# Patient Record
Sex: FEMALE | Race: WHITE | NOT HISPANIC OR LATINO | Employment: FULL TIME | URBAN - METROPOLITAN AREA
[De-identification: names, ages, dates, MRNs, and addresses within clinical notes are randomized per-mention and may not be internally consistent; named-entity substitution may affect disease eponyms.]

---

## 2017-01-06 ENCOUNTER — OFFICE VISIT (OUTPATIENT)
Dept: BARIATRICS/WEIGHT MGMT | Facility: CLINIC | Age: 52
End: 2017-01-06

## 2017-01-06 VITALS
BODY MASS INDEX: 39.69 KG/M2 | WEIGHT: 224 LBS | RESPIRATION RATE: 18 BRPM | TEMPERATURE: 97.9 F | HEIGHT: 63 IN | DIASTOLIC BLOOD PRESSURE: 76 MMHG | HEART RATE: 72 BPM | SYSTOLIC BLOOD PRESSURE: 130 MMHG

## 2017-01-06 VITALS
RESPIRATION RATE: 18 BRPM | SYSTOLIC BLOOD PRESSURE: 127 MMHG | BODY MASS INDEX: 42.52 KG/M2 | HEART RATE: 87 BPM | HEIGHT: 63 IN | OXYGEN SATURATION: 98 % | DIASTOLIC BLOOD PRESSURE: 79 MMHG | TEMPERATURE: 98.1 F | WEIGHT: 240 LBS

## 2017-01-06 DIAGNOSIS — E66.9 DIABETES MELLITUS TYPE 2 IN OBESE (HCC): ICD-10-CM

## 2017-01-06 DIAGNOSIS — E66.01 MORBID OBESITY, UNSPECIFIED OBESITY TYPE (HCC): ICD-10-CM

## 2017-01-06 DIAGNOSIS — E11.69 DIABETES MELLITUS TYPE 2 IN OBESE (HCC): ICD-10-CM

## 2017-01-06 DIAGNOSIS — G47.33 OBSTRUCTIVE SLEEP APNEA SYNDROME: Primary | ICD-10-CM

## 2017-01-06 PROCEDURE — 99213 OFFICE O/P EST LOW 20 MIN: CPT | Performed by: PHYSICIAN ASSISTANT

## 2017-01-06 RX ORDER — DICLOFENAC SODIUM AND MISOPROSTOL 75; 200 MG/1; UG/1
1 TABLET, DELAYED RELEASE ORAL 2 TIMES DAILY
COMMUNITY
End: 2017-02-08

## 2017-01-06 RX ORDER — CHOLECALCIFEROL (VITAMIN D3) 125 MCG
10 CAPSULE ORAL NIGHTLY
COMMUNITY
End: 2021-06-07

## 2017-01-06 RX ORDER — TRAZODONE HYDROCHLORIDE 150 MG/1
75 TABLET ORAL NIGHTLY
COMMUNITY
Start: 2016-12-04 | End: 2021-06-28

## 2017-01-06 RX ORDER — MONTELUKAST SODIUM 10 MG/1
10 TABLET ORAL NIGHTLY
COMMUNITY
End: 2017-02-08

## 2017-01-06 NOTE — PROGRESS NOTES
"[facility] Bariatric Surgery  [address]  [facility] [id number] [phone]        Patient Name: Denia Wooten.  : 1965      Date of Visit: 2017      Reason for Visit:  Reevaluation - pursuing revision.    HPI:  Denia Wooten is a 51 y.o. female s/p LAGB APS  GDW, replaced w/ APL  GDW for band slip, followed by AGBR 2016 d/t band intolerance w/ chronic dysphagia/N/V.  All issues resolved w/ band removal, but unfortunately she remains \"starved\" all the time and is really struggling w/ portion control.  She gained 24 lbs the 1st 3 months following band removal.   She is really working on making healthy choices but feels it is hopeless.  Eating 3 meals/day w/ 2 snacks.  Drinking water and tea/coffee w/ sweetener.  Bought an ellipitical but has only been able to use it once/week d/t worsening knee pain.  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  Has lost 3.5 lbs in the last 4 weeks which she attributes to working overtime during the holidays.         Past Medical History   Diagnosis Date   • Allergic rhinitis    • Diabetes mellitus      borderline   • Dyspepsia    • Dyspnea on exertion    • Fatigue    • Insomnia    • Joint pain    • Nephrolithiasis    • Obesity    • Obstructive sleep apnea      CPAP compliant   • Peripheral edema    • Restless leg syndrome      Past Surgical History   Procedure Laterality Date   • Colonoscopy       unremarkable   • Gastric banding       s/p LAGB APS  GDW, replaced w/ APL  GDW for band slip, followed by AGBR 2016 d/t band intolerance.     Outpatient Prescriptions Marked as Taking for the 17 encounter (Office Visit) with LAYA Choi   Medication Sig Dispense Refill   • diclofenac-misoprostol (ARTHROTEC 75) 75-0.2 MG EC tablet Take 1 tablet by mouth 2 (Two) Times a Day.     • melatonin 5 MG tablet tablet Take 5 mg by mouth.     • traZODone (DESYREL) 150 MG tablet        Allergies   Allergen " "Reactions   • Sulfa Antibiotics Rash     Family History   Problem Relation Age of Onset   • Hypertension Mother    • Diabetes Mother    • Hypertension Father    • Sleep apnea Father    • Stroke Father    • Cancer Maternal Grandmother    • Cancer Maternal Grandfather    • Hypertension Maternal Grandfather    • Diabetes Maternal Grandfather    • Stroke Maternal Grandfather    • Heart attack Paternal Grandfather        Social History     Social History   • Marital status:      Spouse name: N/A   • Number of children: N/A   • Years of education: N/A     Occupational History   •  - walking route      Social History Main Topics   • Smoking status: Former Smoker     Packs/day: 1.50     Years: 25.00     Quit date: 2001   • Smokeless tobacco: Never Used   • Alcohol use Yes      Comment: wine on occasion   • Drug use: No   • Sexual activity: Not on file      Comment:      Other Topics Concern   • Not on file     Social History Narrative    Lives in [place], [place] w/ .       Visit Vitals   • /79 (BP Location: Left arm, Patient Position: Sitting, Cuff Size: Large Adult)   • Pulse 87   • Temp 98.1 °F (36.7 °C) (Temporal Artery )   • Resp 18   • Ht 63\" (160 cm)   • Wt 240 lb (109 kg)  Comment: pre-op weight 219 lbs   • SpO2 98%   • BMI 42.51 kg/m2       Physical Exam   Constitutional: She is oriented to person, place, and time. She appears well-developed and well-nourished.   HENT:   Head: Normocephalic and atraumatic.   Eyes: No scleral icterus.   Neck: Neck supple.   Cardiovascular: Normal rate and regular rhythm.    No murmur heard.  Pulmonary/Chest: Breath sounds normal. No respiratory distress. She has no wheezes. She has no rales.   Abdominal: Soft. Bowel sounds are normal. She exhibits no distension and no mass. There is no tenderness. No hernia.   Scars: lapband - well healed incisions   Musculoskeletal: Normal range of motion.   Neurological: She is alert and oriented to person, " place, and time.   Skin: Skin is warm and dry.   Psychiatric: She has a normal mood and affect.   Vitals reviewed.        Assessment:  4 months s/p lapband removal d/t band intolerance.  Struggling w/ weight regain and portion control, despite best efforts.  Pursuing revision to LSG.      ICD-10-CM ICD-9-CM   1. Obstructive sleep apnea syndrome G47.33 327.23   2. Diabetes mellitus type 2 in obese E11.9 250.00    E66.9 278.00   3. Morbid obesity, unspecified obesity type E66.01 278.01       Plan: Continue to focus on healthy habits.  Reminded to eat protein first.  Continue to limit carbs.  Increase exercise as able.  Increase daily water intake.  Try to eliminate artificial sweeteners.      The patient was instructed to follow up as needed.    note: approx 15 of the 25 minute visit was spent counseling on dietary/lifestyle modifications

## 2017-01-06 NOTE — MR AVS SNAPSHOT
Denia PINZON Je   2017 10:30 AM   Office Visit    Dept Phone:  [phone]   Encounter #:  [id number]    Provider:  LAYA Choi   Department:  [facility] BARIATRIC SURGERY                Your Full Care Plan              Your Updated Medication List          This list is accurate as of: 17 10:50 AM.  Always use your most recent med list.                diclofenac-misoprostol 75-0.2 MG EC tablet   Commonly known as:  ARTHROTEC 75       melatonin 5 MG tablet tablet       montelukast 10 MG tablet   Commonly known as:  SINGULAIR       traZODone 150 MG tablet   Commonly known as:  DESYREL               You Were Diagnosed With        Codes Comments    Obstructive sleep apnea syndrome    -  Primary ICD-10-CM: G47.33  ICD-9-CM: 327.23     Diabetes mellitus type 2 in obese     ICD-10-CM: E11.9, E66.9  ICD-9-CM: 250.00, 278.00     Morbid obesity, unspecified obesity type     ICD-10-CM: E66.01  ICD-9-CM: 278.01       Instructions     None    Patient Instructions History      Upcoming Appointments     Visit Type Date Time Department    OFFICE VISIT 2017 10:30 AM [facility] BARIATRIC [facility]      MyChart Signup     [facility] allows you to send messages to your doctor, view your test results, renew your prescriptions, schedule appointments, and more. To sign up, go to [employer] and click on the Sign Up Now link in the New User? box. Enter your [facility] Activation Code exactly as it appears below along with the last four digits of your Social Security Number and your Date of Birth () to complete the sign-up process. If you do not sign up before the expiration date, you must request a new code.    [facility] Activation Code: [email or web address]  Expires: 2017 10:50 AM    If you have questions, you can email [email or web address] or call [phone] to talk to our [facility] staff. Remember, [facility] is NOT to be used for urgent needs. For  "medical emergencies, dial 911.               Other Info from Your Visit           Allergies     Sulfa Antibiotics  Rash      Vital Signs     Blood Pressure Pulse Temperature Respirations Height    127/79 (BP Location: Left arm, Patient Position: Sitting, Cuff Size: Large Adult) 87 98.1 °F (36.7 °C) (Temporal Artery ) 18 63\" (160 cm)    Weight Oxygen Saturation Body Mass Index Smoking Status       240 lb (109 kg) 98% 42.51 kg/m2 Former Smoker       Problems and Diagnoses Noted     Sleep apnea    -  Primary    Diabetes mellitus type 2 in obese        Severe obesity            "

## 2017-01-23 DIAGNOSIS — R06.00 DYSPNEA, UNSPECIFIED TYPE: Primary | ICD-10-CM

## 2017-01-23 DIAGNOSIS — R06.00 DYSPNEA, UNSPECIFIED TYPE: ICD-10-CM

## 2017-01-23 DIAGNOSIS — R53.83 FATIGUE, UNSPECIFIED TYPE: ICD-10-CM

## 2017-01-23 RX ORDER — MELOXICAM 15 MG/1
15 TABLET ORAL DAILY
COMMUNITY
End: 2017-01-31 | Stop reason: ALTCHOICE

## 2017-01-31 ENCOUNTER — CONSULT (OUTPATIENT)
Dept: BARIATRICS/WEIGHT MGMT | Facility: CLINIC | Age: 52
End: 2017-01-31

## 2017-01-31 ENCOUNTER — DOCUMENTATION (OUTPATIENT)
Dept: BARIATRICS/WEIGHT MGMT | Facility: CLINIC | Age: 52
End: 2017-01-31

## 2017-01-31 VITALS
TEMPERATURE: 98.1 F | HEART RATE: 70 BPM | SYSTOLIC BLOOD PRESSURE: 171 MMHG | WEIGHT: 229.5 LBS | DIASTOLIC BLOOD PRESSURE: 92 MMHG | HEIGHT: 63 IN | BODY MASS INDEX: 40.66 KG/M2

## 2017-01-31 DIAGNOSIS — E66.01 MORBID OBESITY WITH BMI OF 40.0-44.9, ADULT (HCC): Primary | ICD-10-CM

## 2017-01-31 PROCEDURE — 99215 OFFICE O/P EST HI 40 MIN: CPT | Performed by: SURGERY

## 2017-01-31 PROCEDURE — 99407 BEHAV CHNG SMOKING > 10 MIN: CPT | Performed by: SURGERY

## 2017-01-31 RX ORDER — SODIUM CHLORIDE, SODIUM LACTATE, POTASSIUM CHLORIDE, CALCIUM CHLORIDE 600; 310; 30; 20 MG/100ML; MG/100ML; MG/100ML; MG/100ML
150 INJECTION, SOLUTION INTRAVENOUS CONTINUOUS
Status: CANCELLED | OUTPATIENT
Start: 2017-01-31

## 2017-01-31 RX ORDER — SODIUM CHLORIDE 0.9 % (FLUSH) 0.9 %
1-10 SYRINGE (ML) INJECTION AS NEEDED
Status: CANCELLED | OUTPATIENT
Start: 2017-01-31

## 2017-01-31 RX ORDER — ACETAMINOPHEN 10 MG/ML
1000 INJECTION, SOLUTION INTRAVENOUS ONCE
Status: CANCELLED | OUTPATIENT
Start: 2017-01-31 | End: 2017-01-31

## 2017-01-31 RX ORDER — CHLORHEXIDINE GLUCONATE 0.12 MG/ML
15 RINSE ORAL ONCE
Status: CANCELLED | OUTPATIENT
Start: 2017-01-31

## 2017-01-31 RX ORDER — PANTOPRAZOLE SODIUM 40 MG/10ML
40 INJECTION, POWDER, LYOPHILIZED, FOR SOLUTION INTRAVENOUS ONCE
Status: CANCELLED | OUTPATIENT
Start: 2017-01-31 | End: 2017-01-31

## 2017-01-31 RX ORDER — SCOLOPAMINE TRANSDERMAL SYSTEM 1 MG/1
1 PATCH, EXTENDED RELEASE TRANSDERMAL ONCE
Status: CANCELLED | OUTPATIENT
Start: 2017-01-31 | End: 2017-01-31

## 2017-01-31 NOTE — PROGRESS NOTES
"[facility] Bariatric Surgery  [address]  [facility] [id number] [phone]        Patient Name: Denia Wooten.  : 1965      Reason for Visit:  Weight gain, pursuing LSG.    HPI:  Denia Wooten is a 51 y.o. female s/p LAGB APS  GDW, replaced w/ APL  GDW for band slip, followed by AGBR 2016 d/t band intolerance w/ chronic dysphagia/N/V.  All issues resolved w/ band removal, but unfortunately she remains \"starved\" all the time and is really struggling w/ portion control.  She gained 24 lbs the 1st 3 months following band removal.   She is really working on making healthy choices but feels it is hopeless.  Eating 3 meals/day w/ 2 snacks.  Drinking water and tea/coffee w/ sweetener.  Bought an ellipitical but has only been able to use it once/week d/t worsening knee pain.  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  Frustrated and struggling w/ the inability to lose and maintain her weight w/out the lapband.       Past Medical History   Diagnosis Date   • Allergic rhinitis    • Diabetes mellitus      borderline   • Dyspepsia    • Dyspnea on exertion    • Fatigue    • Insomnia    • Joint pain    • Nephrolithiasis    • Obesity    • Obstructive sleep apnea      CPAP compliant   • Peripheral edema    • Restless leg syndrome      Past Surgical History   Procedure Laterality Date   • Colonoscopy       unremarkable   • Gastric banding       s/p LAGB APS  GDW, replaced w/ APL  GDW for band slip, followed by AGBR 2016 d/t band intolerance.   • Kidney stone surgery         Current Outpatient Prescriptions:   •  diclofenac-misoprostol (ARTHROTEC 75) 75-0.2 MG EC tablet, Take 1 tablet by mouth 2 (Two) Times a Day., Disp: , Rfl:   •  melatonin 5 MG tablet tablet, Take 5 mg by mouth., Disp: , Rfl:   •  montelukast (SINGULAIR) 10 MG tablet, Take 10 mg by mouth Every Night., Disp: , Rfl:   •  traZODone (DESYREL) 150 MG tablet, , Disp: , Rfl: "     Allergies   Allergen Reactions   • Sulfa Antibiotics Rash     Family History   Problem Relation Age of Onset   • Hypertension Mother    • Diabetes Mother    • Hypertension Father    • Sleep apnea Father    • Stroke Father    • Cancer Maternal Grandmother    • Cancer Maternal Grandfather    • Hypertension Maternal Grandfather    • Diabetes Maternal Grandfather    • Stroke Maternal Grandfather    • Heart attack Paternal Grandfather        Social History     Social History   • Marital status:      Spouse name: N/A   • Number of children: N/A   • Years of education: N/A     Occupational History   •  - walking route      Social History Main Topics   • Smoking status: Former Smoker     Packs/day: 1.50     Years: 25.00     Quit date: 2001   • Smokeless tobacco: Never Used   • Alcohol use Yes      Comment: wine on occasion   • Drug use: No   • Sexual activity: Not on file      Comment:      Other Topics Concern   • Not on file     Social History Narrative    Lives in [place], [place] w/ .     Review Of Systems   Systemic: Appetite change, feeling tired (fatigue), recent weight gain, and Night sweats.  Eyes: Wears glasses.  Otolaryngeal: Chronic allergies.  Breasts: No mammogram concerns.  Patient has had 0 pregnancies previously and patient has 0 child(candido).  Cardiovascular: No hypertension and no ischemic heart disease.  Edema.  No deep venous thrombosis.  Pulmonary: No asthma.  Suffers from sleep apnea.  No pulmonary embolism.  Dyspnea during exertion.  Gastrointestinal: No heartburn.  No liver disease, no irritable bowel, no pancreatic disease, and no gallbladder problems.  Genitourinary: Kidney stones.  Endocrine: No dyslipidemia and no hypothyroidism.  Glucose Intolerance.  No hyperthyroidism.  Hematologic: No bleeding disorder and no prior blood transfusions.  Musculoskeletal: No fibromyalgia.  Knee joint pain and bone pain in the foot.  Neurological: No seizures and no strokes.   Legs feel restless.  Psychological: No anxiety and no depression.  Insomnia.  No bipolar disorder.  Skin: No history of MRSA skin infections.   All other systems reviewed/negative.      There were no vitals taken for this visit.    Physical Exam   Constitutional: She is oriented to person, place, and time. She appears well-developed and well-nourished.   HENT:   Head: Normocephalic and atraumatic.   Eyes: Conjunctivae are normal. No scleral icterus.   Neck: Neck supple. No thyromegaly present.   Cardiovascular: Normal rate and regular rhythm.    No murmur heard.  Pulmonary/Chest: Effort normal and breath sounds normal. No respiratory distress. She has no wheezes. She has no rales.   Abdominal: Soft. Bowel sounds are normal. She exhibits no distension and no mass. There is no tenderness. No hernia.   Scars: lapband - well healed incisions   Musculoskeletal: Normal range of motion. She exhibits no edema.   Neurological: She is alert and oriented to person, place, and time. Gait normal.   Skin: Skin is warm and dry. No rash noted.   Psychiatric: She has a normal mood and affect. Judgment normal.   Vitals reviewed.    Patient Active Problem List   Diagnosis   • Restless leg syndrome   • Peripheral edema   • Obstructive sleep apnea   • Obesity   • Nephrolithiasis   • Joint pain   • Insomnia   • Fatigue   • Dyspnea on exertion   • Dyspepsia   • Diabetes mellitus   • Allergic rhinitis         Assessment:  52 y/o female s/p lapband removal w/ medically complicated obesity pursuing revision to gastric sleeve.      Weight Loss Surgery is deemed medically necessary given the following obesity related comorbidities: There is no height or weight on file to calculate BMI., Diabetes, and Sleep Apnea.        Plan  • Patient is a candidate for surgery pending lab results and other testing or clearance.  • The following procedure is being planned:  Gastric Sleeve.  • Desired Location for Procedure:  [facility].  •  Letter of support will be obtained from primary care physician.  • Psychiatric evaluation will be performed.  • Cardiologist to evaluate for cardiac clearance.  • Preop labs will be obtained.  • Patient was advised to start a high protein, low carbohydrate diet..  • Information on JOYCE educational video was given to the patient  This is an internet based educational video which explains the surgical procedure and answers basic questions regarding the procedure.  • The consult plan was reviewed with the patient.

## 2017-01-31 NOTE — PROGRESS NOTES
"[facility] Bariatric Surgery  [address]  [facility] [id number] [phone]        Patient Name: Denia Wooten.  : 1965      Date of Visit: 2017      Reason for Visit:  Weight gain, pursuing LSG.  Preop today for LSG.    HPI:  Denia Wooten is a 51 y.o. female s/p LAGB APS  GDW, replaced w/ APL  GDW for band slip, followed by AGBR 2016 d/t band intolerance w/ chronic dysphagia/N/V.  All issues resolved w/ band removal, but unfortunately she remains \"starved\" all the time and is really struggling w/ portion control.  She gained 24 lbs the 1st 3 months following band removal.   She is really working on making healthy choices but feels it is hopeless.  Eating 3 meals/day w/ 2 snacks.  Drinking water and tea/coffee w/ sweetener.  Bought an ellipitical but has only been able to use it once/week d/t worsening knee pain.  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  Frustrated and struggling w/ the inability to lose and maintain her weight w/out the lapband.     Returns for final visit prior to LSG.  Says no probs w AGBR .      Past Medical History   Diagnosis Date   • Abnormal chest xray      decreased lung volumes   • Allergic rhinitis    • Diabetes mellitus      borderline   • Dyspepsia    • Dyspnea on exertion    • Fatigue    • Hypercholesteremia      per prim note, no meds   • Hypertension      Meds in the past.  171/92 today   • Insomnia    • Joint pain    • Nephrolithiasis    • Normocytic anemia      12.4/37.2   • Obesity    • Obstructive sleep apnea      CPAP says noncompliant - marlin her/claustrophobic   • Osteoarthritis of both knees      per prim.  On diclofenac daily, prn Aleve   • Peripheral edema    • Restless leg syndrome      Past Surgical History   Procedure Laterality Date   • Colonoscopy       unremarkable   • Gastric banding       s/p LAGB APS  GDW, replaced w/ APL  GDW for band slip, followed by AGBR 2016 d/t band " intolerance.   • Kidney stone surgery  2013     Current Outpatient Prescriptions:   •  diclofenac-misoprostol (ARTHROTEC 75) 75-0.2 MG EC tablet, Take 1 tablet by mouth 2 (Two) Times a Day., Disp: , Rfl:   •  melatonin 5 MG tablet tablet, Take 5 mg by mouth., Disp: , Rfl:   •  montelukast (SINGULAIR) 10 MG tablet, Take 10 mg by mouth Every Night., Disp: , Rfl:   •  traZODone (DESYREL) 150 MG tablet, , Disp: , Rfl:     Allergies   Allergen Reactions   • Sulfa Antibiotics Rash     Family History   Problem Relation Age of Onset   • Hypertension Mother    • Diabetes Mother    • Hypertension Father    • Sleep apnea Father    • Stroke Father    • Cancer Maternal Grandmother    • Cancer Maternal Grandfather    • Hypertension Maternal Grandfather    • Diabetes Maternal Grandfather    • Stroke Maternal Grandfather    • Heart attack Paternal Grandfather        Social History     Social History   • Marital status:      Spouse name: N/A   • Number of children: N/A   • Years of education: N/A     Occupational History   •  - walking route      Social History Main Topics   • Smoking status: Former Smoker     Packs/day: 1.50     Years: 25.00     Quit date: 2001   • Smokeless tobacco: Never Used   • Alcohol use Yes      Comment: wine on occasion   • Drug use: No   • Sexual activity: Not on file      Comment:      Other Topics Concern   • Not on file     Social History Narrative    Lives in [place], [place] w/ .     Review Of Systems   Systemic: Appetite change, feeling tired (fatigue), recent weight gain, and Night sweats.  Eyes: Wears glasses.  Otolaryngeal: Chronic allergies.  Breasts: No mammogram concerns. Patient has had 0 pregnancies previously and patient has 0 child(candido).  Cardiovascular: No hypertension and no ischemic heart disease. Edema. No deep venous thrombosis.  Pulmonary: No asthma. Suffers from sleep apnea. No pulmonary embolism. Dyspnea during exertion.  Gastrointestinal: No  "heartburn. No liver disease, no irritable bowel, no pancreatic disease, and no gallbladder problems.  Genitourinary: Kidney stones.  Endocrine: No dyslipidemia and no hypothyroidism. Glucose Intolerance. No hyperthyroidism.  Hematologic: No bleeding disorder and no prior blood transfusions.  Musculoskeletal: No fibromyalgia. Knee joint pain and bone pain in the foot.  Neurological: No seizures and no strokes. Legs feel restless.  Psychological: No anxiety and no depression. Insomnia. No bipolar disorder.  Skin: No history of MRSA skin infections.  All other systems reviewed/negative.    Visit Vitals   • /92 (BP Location: Left arm, Patient Position: Sitting)   • Pulse 70   • Temp 98.1 °F (36.7 °C)   • Ht 63\" (160 cm)   • Wt 229 lb 8 oz (104 kg)   • BMI 40.65 kg/m2     Physical Exam   Constitutional: She is oriented to person, place, and time. She appears well-developed and well-nourished.   HENT:   Head: Normocephalic and atraumatic.   Eyes: Conjunctivae and EOM are normal. No scleral icterus.   Neck: Neck supple. Carotid bruit is not present. No thyromegaly present.   Cardiovascular: Normal rate and regular rhythm.    No murmur heard.  Pulmonary/Chest: Effort normal and breath sounds normal. No respiratory distress. She has no wheezes. She has no rales.   Abdominal: Soft. Bowel sounds are normal. She exhibits no distension and no mass. There is no hepatosplenomegaly. There is no tenderness. No hernia.       Scars: old band, 3 AGBR scars   Musculoskeletal: Normal range of motion. She exhibits no edema.   Neurological: She is alert and oriented to person, place, and time.   Skin: Skin is warm and dry.   Psychiatric: She has a normal mood and affect. Judgment normal.   Vitals reviewed.        Assessment:  50 y/o female s/p lapband removal w/ medically complicated obesity pursuing revision to gastric sleeve.      Weight Loss Surgery is deemed medically necessary given the following obesity related comorbidities: " "There is no height or weight on file to calculate BMI., Diabetes, and Sleep Apnea.     Patient is aware that surgery is a tool, and that weight loss is not guaranteed but only seen in the context of appropriate use, follow up and exercise.    Complications  of laparoscopic/possible robotic gastric sleeve were discussed. The patient is well aware of the potential complications of surgery that include but not limited to bleeding, infections, deep venous thrombosis, pulmonary embolism, pulmonary complications such as pneumonia, cardiac events, hernias, small bowel obstruction, damage to the spleen or other organs, bowel injury, disfiguring scars, failure to lose weight, need for additional surgery, conversion to an open procedure, and death. Patient is also aware of complications which apply in this particular procedure that can include but are not limited to a \"leak\" at the staple line which in some instances may require conversion to gastric bypass.    Greater than 10 minutes was spent with the patient discussing avoiding all tobacco products and second hand smoke at least 2 weeks pre-operatively and 6 weeks post-operatively to minimize the risk of sleeve leak     Discussed the risks, benefits and alternative therapies at great length as outlined in our extensive consent forms, consent videos, and educational teaching process under the direction of the center's .    A copy of the patient's signed informed consent is on file.    Plan:  • The following procedure is being planned: Gastric Sleeve.  • Desired Location for Procedure: [facility].  • Letter of support obtained from primary care physician.  • Psychiatric evaluation complete.  • Cardiac clearance obtained.  • Patient was advised to start a high protein, low carbohydrate diet..  • Information on JOYCE educational video was given to the patient This is an internet based educational video which explains the surgical procedure and " answers basic questions regarding the procedure.    This will be the 4th surgery in the area (AGB, AGB replacement, AGBR, now LSG).  Pt is aware that LSG after prev AGB/AGBR carries increased risk over primary LSG alone, samuel wrt bleeding, infection, leak, prolonged OR times with incr risk pulm complications and VTE, etc and still wishes to proceed    Johnathan Burkett MD

## 2017-01-31 NOTE — LETTER
"2017     Mariel Lima MD  [address] [facility] [address]    Patient: Denia Wooten   YOB: 1965   Date of Visit: 2017       Dear Dr. Claernce MD:    Thank you for referring Denia Wooten to me for evaluation. Below are the relevant portions of my assessment and plan of care.    If you have questions, please do not hesitate to call me. I look forward to following Denia along with you.         Sincerely,        Johnathan Burkett MD        CC: No Recipients  Johnathan Burkett MD  2017  1:29 PM  Signed  [facility]  [address]  [facility] [address] [phone]        Patient Name: Denia Wooten.  : 1965      Date of Visit: 2017      Reason for Visit:  Reevaluation - pursuing revision.  Preop LSG.    HPI:  Denia Wooten is a 51 y.o. female s/p LAGB APS  GDW, replaced w/ APL  GDW for band slip, followed by AGBR 2016 d/t band intolerance w/ chronic dysphagia/N/V.  All issues resolved w/ band removal, but unfortunately she remains \"starved\" all the time and is really struggling w/ portion control.  She gained 24 lbs the 1st 3 months following band removal.   She is really working on making healthy choices but feels it is hopeless.  Eating 3 meals/day w/ 2 snacks.  Drinking water and tea/coffee w/ sweetener.  Bought an [employer]itical but has only been able to use it once/week d/t worsening knee pain.  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  Has lost 3.5 lbs in the last 4 weeks which she attributes to working overtime during the holidays.       Returns for final visit prior to LSG.  Says no probs w AGBR .      Past Medical History   Diagnosis Date   • Abnormal chest xray      decreased lung volumes   • Allergic rhinitis    • Diabetes mellitus      borderline   • Dyspepsia    • Dyspnea on exertion    • Fatigue    • Hypercholesteremia      per prim note, no meds   • Hypertension      " "Meds in the past.  171/92 today   • Insomnia    • Joint pain    • Nephrolithiasis    • Normocytic anemia      12.4/37.2   • Obesity    • Obstructive sleep apnea      CPAP says noncompliant - marlin her/claustrophobic   • Osteoarthritis of both knees      per prim.  On diclofenac daily, prn Aleve   • Peripheral edema    • Restless leg syndrome      Past Surgical History   Procedure Laterality Date   • Colonoscopy  2014     unremarkable   • Gastric banding       s/p LAGB APS 2007 GDW, replaced w/ APL 2010 GDW for band slip, followed by AGBR 9/2016 d/t band intolerance.   • Kidney stone surgery  2013     No outpatient prescriptions have been marked as taking for the 1/31/17 encounter (Consult) with Johnathan Burkett MD.     Allergies   Allergen Reactions   • Sulfa Antibiotics Rash     Family History   Problem Relation Age of Onset   • Hypertension Mother    • Diabetes Mother    • Hypertension Father    • Sleep apnea Father    • Stroke Father    • Cancer Maternal Grandmother    • Cancer Maternal Grandfather    • Hypertension Maternal Grandfather    • Diabetes Maternal Grandfather    • Stroke Maternal Grandfather    • Heart attack Paternal Grandfather        Social History     Social History   • Marital status:      Spouse name: N/A   • Number of children: N/A   • Years of education: N/A     Occupational History   •  - walking route      Social History Main Topics   • Smoking status: Former Smoker     Packs/day: 1.50     Years: 25.00     Quit date: 2001   • Smokeless tobacco: Never Used   • Alcohol use Yes      Comment: wine on occasion   • Drug use: No   • Sexual activity: Not on file      Comment:      Other Topics Concern   • Not on file     Social History Narrative    Lives in [place], [place] w/ .       Visit Vitals   • /92 (BP Location: Left arm, Patient Position: Sitting)   • Pulse 70   • Temp 98.1 °F (36.7 °C)   • Ht 63\" (160 cm)   • Wt 229 lb 8 oz (104 kg)   • BMI 40.65 " "kg/m2       Physical Exam   Constitutional: She is oriented to person, place, and time. She appears well-developed and well-nourished.   HENT:   Head: Normocephalic and atraumatic.   Eyes: No scleral icterus.   Neck: Neck supple. Carotid bruit is not present. No thyromegaly present.   Cardiovascular: Normal rate and regular rhythm.    No murmur heard.  Pulmonary/Chest: Breath sounds normal. No respiratory distress. She has no wheezes. She has no rales.   Abdominal: Soft. Bowel sounds are normal. She exhibits no distension and no mass. There is no hepatosplenomegaly. There is no tenderness. No hernia.       Scars: old band, 3 AGBR scars   Musculoskeletal: Normal range of motion.   Neurological: She is alert and oriented to person, place, and time.   Skin: Skin is warm and dry.   Psychiatric: She has a normal mood and affect.   Vitals reviewed.        Assessment and Plan:  4 months s/p lapband removal d/t band intolerance.  Struggling w/ weight regain and portion control, despite best efforts.  Preop today to  revision to LSG.      Patient is aware that surgery is a tool, and that weight loss is not guaranteed but only seen in the context of appropriate use, follow up and exercise.    Complications  of laparoscopic/possible robotic gastric sleeve were discussed. The patient is well aware of the potential complications of surgery that include but not limited to bleeding, infections, deep venous thrombosis, pulmonary embolism, pulmonary complications such as pneumonia, cardiac events, hernias, small bowel obstruction, damage to the spleen or other organs, bowel injury, disfiguring scars, failure to lose weight, need for additional surgery, conversion to an open procedure, and death. Patient is also aware of complications which apply in this particular procedure that can include but are not limited to a \"leak\" at the staple line which in some instances may require conversion to gastric bypass.    Greater than 10 minutes " was spent with the patient discussing avoiding all tobacco products and second hand smoke at least 2 weeks pre-operatively and 6 weeks post-operatively to minimize the risk of sleeve leak     Discussed the risks, benefits and alternative therapies at great length as outlined in our extensive consent forms, consent videos, and educational teaching process under the direction of the center's .    A copy of the patient's signed informed consent is on file.    This will be the 4th surgery in the area (AGB, AGB replacement, AGBR, now LSG).  Pt is aware that LSG after prev AGB/AGBR carries increased risk over primary LSG alone, samuel wrt bleeding, infection, leak, prolonged OR times with incr risk pulm complications and VTE, etc and still wishes to proceed

## 2017-02-01 ENCOUNTER — TELEPHONE (OUTPATIENT)
Dept: BARIATRICS/WEIGHT MGMT | Facility: CLINIC | Age: 52
End: 2017-02-01

## 2017-02-08 ENCOUNTER — APPOINTMENT (OUTPATIENT)
Dept: PREADMISSION TESTING | Facility: HOSPITAL | Age: 52
End: 2017-02-08

## 2017-02-08 ENCOUNTER — ANESTHESIA EVENT (OUTPATIENT)
Dept: PERIOP | Facility: HOSPITAL | Age: 52
End: 2017-02-08

## 2017-02-08 LAB
DEPRECATED RDW RBC AUTO: 42.5 FL (ref 37–54)
ERYTHROCYTE [DISTWIDTH] IN BLOOD BY AUTOMATED COUNT: 12.5 % (ref 11.3–14.5)
HBA1C MFR BLD: 6.3 % (ref 4.8–5.6)
HCT VFR BLD AUTO: 40.1 % (ref 34.5–44)
HGB BLD-MCNC: 13.2 G/DL (ref 11.5–15.5)
MCH RBC QN AUTO: 31 PG (ref 27–31)
MCHC RBC AUTO-ENTMCNC: 32.9 G/DL (ref 32–36)
MCV RBC AUTO: 94.1 FL (ref 80–99)
PLATELET # BLD AUTO: 303 10*3/MM3 (ref 150–450)
PMV BLD AUTO: 9.4 FL (ref 6–12)
RBC # BLD AUTO: 4.26 10*6/MM3 (ref 3.89–5.14)
WBC NRBC COR # BLD: 5.83 10*3/MM3 (ref 3.5–10.8)

## 2017-02-08 RX ORDER — MELATONIN
1000 NIGHTLY
COMMUNITY
End: 2022-11-28 | Stop reason: ALTCHOICE

## 2017-02-08 RX ORDER — FAMOTIDINE 10 MG/ML
20 INJECTION, SOLUTION INTRAVENOUS ONCE
Status: CANCELLED | OUTPATIENT
Start: 2017-02-08 | End: 2017-02-08

## 2017-02-09 ENCOUNTER — HOSPITAL ENCOUNTER (INPATIENT)
Facility: HOSPITAL | Age: 52
LOS: 1 days | Discharge: HOME | End: 2017-02-10
Attending: SURGERY | Admitting: SURGERY

## 2017-02-09 ENCOUNTER — ANESTHESIA (OUTPATIENT)
Dept: PERIOP | Facility: HOSPITAL | Age: 52
End: 2017-02-09

## 2017-02-09 DIAGNOSIS — E66.01 MORBID OBESITY WITH BMI OF 40.0-44.9, ADULT (HCC): ICD-10-CM

## 2017-02-09 LAB
B-HCG UR QL: NEGATIVE
GLUCOSE BLDC GLUCOMTR-MCNC: 160 MG/DL (ref 70–130)
INTERNAL NEGATIVE CONTROL: NORMAL
INTERNAL POSITIVE CONTROL: REACTIVE
Lab: NORMAL

## 2017-02-09 PROCEDURE — 0BQS4ZZ REPAIR LEFT DIAPHRAGM, PERCUTANEOUS ENDOSCOPIC APPROACH: ICD-10-PCS | Performed by: SURGERY

## 2017-02-09 PROCEDURE — 43775 LAP SLEEVE GASTRECTOMY: CPT | Performed by: SURGERY

## 2017-02-09 PROCEDURE — 25010000002 DEXAMETHASONE SODIUM PHOSPHATE 10 MG/ML SOLUTION: Performed by: NURSE ANESTHETIST, CERTIFIED REGISTERED

## 2017-02-09 PROCEDURE — 0DB64Z3 EXCISION OF STOMACH, PERCUTANEOUS ENDOSCOPIC APPROACH, VERTICAL: ICD-10-PCS | Performed by: SURGERY

## 2017-02-09 PROCEDURE — 25010000002 DEXAMETHASONE PER 1 MG: Performed by: NURSE ANESTHETIST, CERTIFIED REGISTERED

## 2017-02-09 PROCEDURE — 93005 ELECTROCARDIOGRAM TRACING: CPT | Performed by: ANESTHESIOLOGY

## 2017-02-09 PROCEDURE — 0FN24ZZ RELEASE LEFT LOBE LIVER, PERCUTANEOUS ENDOSCOPIC APPROACH: ICD-10-PCS | Performed by: SURGERY

## 2017-02-09 PROCEDURE — 25010000002 PROPOFOL 1000 MG/ML EMULSION: Performed by: NURSE ANESTHETIST, CERTIFIED REGISTERED

## 2017-02-09 PROCEDURE — 25010000002 BUPRENORPHINE PER 0.1 MG: Performed by: NURSE ANESTHETIST, CERTIFIED REGISTERED

## 2017-02-09 PROCEDURE — 94799 UNLISTED PULMONARY SVC/PX: CPT

## 2017-02-09 PROCEDURE — 25010000002 MORPHINE PER 10 MG: Performed by: SURGERY

## 2017-02-09 PROCEDURE — 0DJ08ZZ INSPECTION OF UPPER INTESTINAL TRACT, VIA NATURAL OR ARTIFICIAL OPENING ENDOSCOPIC: ICD-10-PCS | Performed by: SURGERY

## 2017-02-09 PROCEDURE — 0DN64ZZ RELEASE STOMACH, PERCUTANEOUS ENDOSCOPIC APPROACH: ICD-10-PCS | Performed by: SURGERY

## 2017-02-09 PROCEDURE — 25010000002 ONDANSETRON PER 1 MG: Performed by: NURSE ANESTHETIST, CERTIFIED REGISTERED

## 2017-02-09 PROCEDURE — 25010000002 ENOXAPARIN PER 10 MG: Performed by: SURGERY

## 2017-02-09 PROCEDURE — 82962 GLUCOSE BLOOD TEST: CPT

## 2017-02-09 PROCEDURE — 88307 TISSUE EXAM BY PATHOLOGIST: CPT | Performed by: SURGERY

## 2017-02-09 PROCEDURE — 25010000003 CEFAZOLIN IN DEXTROSE 2-4 GM/100ML-% SOLUTION: Performed by: SURGERY

## 2017-02-09 PROCEDURE — C1763 CONN TISS, NON-HUMAN: HCPCS | Performed by: SURGERY

## 2017-02-09 PROCEDURE — 0BQR4ZZ REPAIR RIGHT DIAPHRAGM, PERCUTANEOUS ENDOSCOPIC APPROACH: ICD-10-PCS | Performed by: SURGERY

## 2017-02-09 PROCEDURE — G0378 HOSPITAL OBSERVATION PER HR: HCPCS

## 2017-02-09 PROCEDURE — 0DNS4ZZ RELEASE GREATER OMENTUM, PERCUTANEOUS ENDOSCOPIC APPROACH: ICD-10-PCS | Performed by: SURGERY

## 2017-02-09 PROCEDURE — 25010000002 PROPOFOL 10 MG/ML EMULSION: Performed by: NURSE ANESTHETIST, CERTIFIED REGISTERED

## 2017-02-09 PROCEDURE — 25010000002 NEOSTIGMINE PER 0.5 MG: Performed by: NURSE ANESTHETIST, CERTIFIED REGISTERED

## 2017-02-09 DEVICE — PERI-STRIPS DRY WITH VERITAS COLLAGEN MATRIX (PSD-V) IS PREPARED FROM DEHYDRATED BOVINE PERICARDIUM PROCURED FROM CATTLE UNDER 30 MONTHS OF AGE IN THE UNITED STATES. ONE (1) TUBE OF PSD GEL (GEL) IS PROVIDED FOR EVERY TWO (2) POUCHES OF PSD-V. THE GEL IS USED TO CREATE A TEMPORARY BOND BETWEEN THE PSD-V BUTTRESS AND THE SURGICAL STAPLER JAWS UNTIL THE STAPLER IS POSITIONED AND FIRED.
Type: IMPLANTABLE DEVICE | Site: STOMACH | Status: FUNCTIONAL
Brand: PERI-STRIPS DRY WITH VERITAS COLLAGEN MATRIX

## 2017-02-09 RX ORDER — HYDROMORPHONE HYDROCHLORIDE 1 MG/ML
0.5 INJECTION, SOLUTION INTRAMUSCULAR; INTRAVENOUS; SUBCUTANEOUS
Status: DISCONTINUED | OUTPATIENT
Start: 2017-02-09 | End: 2017-02-09 | Stop reason: HOSPADM

## 2017-02-09 RX ORDER — CHLORHEXIDINE GLUCONATE 0.12 MG/ML
15 RINSE ORAL ONCE
Status: DISCONTINUED | OUTPATIENT
Start: 2017-02-09 | End: 2017-02-09 | Stop reason: HOSPADM

## 2017-02-09 RX ORDER — SODIUM CHLORIDE 9 MG/ML
INJECTION, SOLUTION INTRAVENOUS AS NEEDED
Status: DISCONTINUED | OUTPATIENT
Start: 2017-02-09 | End: 2017-02-09 | Stop reason: HOSPADM

## 2017-02-09 RX ORDER — MAGNESIUM HYDROXIDE 1200 MG/15ML
LIQUID ORAL AS NEEDED
Status: DISCONTINUED | OUTPATIENT
Start: 2017-02-09 | End: 2017-02-09 | Stop reason: HOSPADM

## 2017-02-09 RX ORDER — SODIUM CHLORIDE, SODIUM LACTATE, POTASSIUM CHLORIDE, CALCIUM CHLORIDE 600; 310; 30; 20 MG/100ML; MG/100ML; MG/100ML; MG/100ML
9 INJECTION, SOLUTION INTRAVENOUS CONTINUOUS
Status: DISCONTINUED | OUTPATIENT
Start: 2017-02-09 | End: 2017-02-09 | Stop reason: HOSPADM

## 2017-02-09 RX ORDER — BUPIVACAINE HCL/0.9 % NACL/PF 0.1 %
3 PLASTIC BAG, INJECTION (ML) EPIDURAL EVERY 8 HOURS
Status: COMPLETED | OUTPATIENT
Start: 2017-02-09 | End: 2017-02-10

## 2017-02-09 RX ORDER — FENTANYL CITRATE 50 UG/ML
50 INJECTION, SOLUTION INTRAMUSCULAR; INTRAVENOUS
Status: DISCONTINUED | OUTPATIENT
Start: 2017-02-09 | End: 2017-02-09 | Stop reason: HOSPADM

## 2017-02-09 RX ORDER — DEXAMETHASONE SODIUM PHOSPHATE 10 MG/ML
INJECTION, SOLUTION INTRAMUSCULAR; INTRAVENOUS AS NEEDED
Status: DISCONTINUED | OUTPATIENT
Start: 2017-02-09 | End: 2017-02-09 | Stop reason: SURG

## 2017-02-09 RX ORDER — SIMETHICONE 80 MG
80 TABLET,CHEWABLE ORAL 4 TIMES DAILY PRN
Status: DISCONTINUED | OUTPATIENT
Start: 2017-02-09 | End: 2017-02-10 | Stop reason: HOSPADM

## 2017-02-09 RX ORDER — TRAZODONE HYDROCHLORIDE 50 MG/1
75 TABLET ORAL NIGHTLY
Status: DISCONTINUED | OUTPATIENT
Start: 2017-02-09 | End: 2017-02-10 | Stop reason: HOSPADM

## 2017-02-09 RX ORDER — MEPERIDINE HYDROCHLORIDE 25 MG/ML
12.5 INJECTION INTRAMUSCULAR; INTRAVENOUS; SUBCUTANEOUS
Status: DISCONTINUED | OUTPATIENT
Start: 2017-02-09 | End: 2017-02-09 | Stop reason: HOSPADM

## 2017-02-09 RX ORDER — ACETAMINOPHEN 10 MG/ML
1000 INJECTION, SOLUTION INTRAVENOUS EVERY 6 HOURS
Status: DISCONTINUED | OUTPATIENT
Start: 2017-02-09 | End: 2017-02-09

## 2017-02-09 RX ORDER — NALOXONE HCL 0.4 MG/ML
0.4 VIAL (ML) INJECTION
Status: DISCONTINUED | OUTPATIENT
Start: 2017-02-09 | End: 2017-02-10 | Stop reason: HOSPADM

## 2017-02-09 RX ORDER — PROMETHAZINE HYDROCHLORIDE 25 MG/ML
12.5 INJECTION, SOLUTION INTRAMUSCULAR; INTRAVENOUS EVERY 4 HOURS PRN
Status: DISCONTINUED | OUTPATIENT
Start: 2017-02-09 | End: 2017-02-10 | Stop reason: HOSPADM

## 2017-02-09 RX ORDER — HYDROCODONE BITARTRATE AND ACETAMINOPHEN 7.5; 325 MG/1; MG/1
1 TABLET ORAL EVERY 4 HOURS PRN
Status: DISCONTINUED | OUTPATIENT
Start: 2017-02-09 | End: 2017-02-10 | Stop reason: HOSPADM

## 2017-02-09 RX ORDER — SODIUM CHLORIDE 0.9 % (FLUSH) 0.9 %
1-10 SYRINGE (ML) INJECTION AS NEEDED
Status: DISCONTINUED | OUTPATIENT
Start: 2017-02-09 | End: 2017-02-09 | Stop reason: HOSPADM

## 2017-02-09 RX ORDER — BUPIVACAINE HYDROCHLORIDE AND EPINEPHRINE 2.5; 5 MG/ML; UG/ML
INJECTION, SOLUTION EPIDURAL; INFILTRATION; INTRACAUDAL; PERINEURAL AS NEEDED
Status: DISCONTINUED | OUTPATIENT
Start: 2017-02-09 | End: 2017-02-09 | Stop reason: HOSPADM

## 2017-02-09 RX ORDER — PROMETHAZINE HYDROCHLORIDE 25 MG/1
25 TABLET ORAL ONCE AS NEEDED
Status: DISCONTINUED | OUTPATIENT
Start: 2017-02-09 | End: 2017-02-09 | Stop reason: HOSPADM

## 2017-02-09 RX ORDER — CEFAZOLIN SODIUM 2 G/100ML
2 INJECTION, SOLUTION INTRAVENOUS ONCE
Status: COMPLETED | OUTPATIENT
Start: 2017-02-09 | End: 2017-02-09

## 2017-02-09 RX ORDER — PROMETHAZINE HYDROCHLORIDE 25 MG/ML
6.25 INJECTION, SOLUTION INTRAMUSCULAR; INTRAVENOUS ONCE AS NEEDED
Status: DISCONTINUED | OUTPATIENT
Start: 2017-02-09 | End: 2017-02-09 | Stop reason: HOSPADM

## 2017-02-09 RX ORDER — FIBRINOGEN HUMAN AND THROMBIN HUMAN 10 ML
KIT TOPICAL AS NEEDED
Status: DISCONTINUED | OUTPATIENT
Start: 2017-02-09 | End: 2017-02-09 | Stop reason: HOSPADM

## 2017-02-09 RX ORDER — DIPHENHYDRAMINE HYDROCHLORIDE 50 MG/ML
25 INJECTION INTRAMUSCULAR; INTRAVENOUS EVERY 4 HOURS PRN
Status: DISCONTINUED | OUTPATIENT
Start: 2017-02-09 | End: 2017-02-10 | Stop reason: HOSPADM

## 2017-02-09 RX ORDER — ONDANSETRON 2 MG/ML
INJECTION INTRAMUSCULAR; INTRAVENOUS AS NEEDED
Status: DISCONTINUED | OUTPATIENT
Start: 2017-02-09 | End: 2017-02-09 | Stop reason: SURG

## 2017-02-09 RX ORDER — LABETALOL HYDROCHLORIDE 5 MG/ML
5 INJECTION, SOLUTION INTRAVENOUS
Status: DISCONTINUED | OUTPATIENT
Start: 2017-02-09 | End: 2017-02-09 | Stop reason: HOSPADM

## 2017-02-09 RX ORDER — PANTOPRAZOLE SODIUM 40 MG/10ML
40 INJECTION, POWDER, LYOPHILIZED, FOR SOLUTION INTRAVENOUS
Status: DISCONTINUED | OUTPATIENT
Start: 2017-02-10 | End: 2017-02-10 | Stop reason: HOSPADM

## 2017-02-09 RX ORDER — BUPRENORPHINE HYDROCHLORIDE 0.32 MG/ML
INJECTION INTRAMUSCULAR; INTRAVENOUS AS NEEDED
Status: DISCONTINUED | OUTPATIENT
Start: 2017-02-09 | End: 2017-02-09 | Stop reason: SURG

## 2017-02-09 RX ORDER — SCOLOPAMINE TRANSDERMAL SYSTEM 1 MG/1
1 PATCH, EXTENDED RELEASE TRANSDERMAL ONCE
Status: DISCONTINUED | OUTPATIENT
Start: 2017-02-09 | End: 2017-02-09

## 2017-02-09 RX ORDER — ONDANSETRON 4 MG/1
4 TABLET, FILM COATED ORAL EVERY 6 HOURS PRN
Status: DISCONTINUED | OUTPATIENT
Start: 2017-02-09 | End: 2017-02-10 | Stop reason: HOSPADM

## 2017-02-09 RX ORDER — LORAZEPAM 1 MG/1
1 TABLET ORAL EVERY 12 HOURS PRN
Status: DISCONTINUED | OUTPATIENT
Start: 2017-02-09 | End: 2017-02-10 | Stop reason: HOSPADM

## 2017-02-09 RX ORDER — CLONIDINE HYDROCHLORIDE 0.1 MG/1
0.1 TABLET ORAL EVERY 6 HOURS PRN
Status: DISCONTINUED | OUTPATIENT
Start: 2017-02-09 | End: 2017-02-10 | Stop reason: HOSPADM

## 2017-02-09 RX ORDER — MORPHINE SULFATE 4 MG/ML
4 INJECTION, SOLUTION INTRAMUSCULAR; INTRAVENOUS
Status: DISCONTINUED | OUTPATIENT
Start: 2017-02-09 | End: 2017-02-10 | Stop reason: HOSPADM

## 2017-02-09 RX ORDER — LABETALOL HYDROCHLORIDE 5 MG/ML
10 INJECTION, SOLUTION INTRAVENOUS
Status: DISCONTINUED | OUTPATIENT
Start: 2017-02-09 | End: 2017-02-10 | Stop reason: HOSPADM

## 2017-02-09 RX ORDER — PROMETHAZINE HYDROCHLORIDE 25 MG/ML
12.5 INJECTION, SOLUTION INTRAMUSCULAR; INTRAVENOUS EVERY 6 HOURS PRN
Status: DISCONTINUED | OUTPATIENT
Start: 2017-02-09 | End: 2017-02-10 | Stop reason: HOSPADM

## 2017-02-09 RX ORDER — FAMOTIDINE 20 MG/1
20 TABLET, FILM COATED ORAL ONCE
Status: DISCONTINUED | OUTPATIENT
Start: 2017-02-09 | End: 2017-02-09 | Stop reason: HOSPADM

## 2017-02-09 RX ORDER — PANTOPRAZOLE SODIUM 40 MG/10ML
40 INJECTION, POWDER, LYOPHILIZED, FOR SOLUTION INTRAVENOUS ONCE
Status: COMPLETED | OUTPATIENT
Start: 2017-02-09 | End: 2017-02-09

## 2017-02-09 RX ORDER — MORPHINE SULFATE 2 MG/ML
6 INJECTION, SOLUTION INTRAMUSCULAR; INTRAVENOUS
Status: DISCONTINUED | OUTPATIENT
Start: 2017-02-09 | End: 2017-02-10 | Stop reason: HOSPADM

## 2017-02-09 RX ORDER — ACETAMINOPHEN 10 MG/ML
1000 INJECTION, SOLUTION INTRAVENOUS EVERY 6 HOURS
Status: COMPLETED | OUTPATIENT
Start: 2017-02-09 | End: 2017-02-10

## 2017-02-09 RX ORDER — SODIUM CHLORIDE, SODIUM LACTATE, POTASSIUM CHLORIDE, CALCIUM CHLORIDE 600; 310; 30; 20 MG/100ML; MG/100ML; MG/100ML; MG/100ML
150 INJECTION, SOLUTION INTRAVENOUS CONTINUOUS
Status: DISCONTINUED | OUTPATIENT
Start: 2017-02-09 | End: 2017-02-10 | Stop reason: HOSPADM

## 2017-02-09 RX ORDER — ACETAMINOPHEN 10 MG/ML
1000 INJECTION, SOLUTION INTRAVENOUS ONCE
Status: COMPLETED | OUTPATIENT
Start: 2017-02-09 | End: 2017-02-09

## 2017-02-09 RX ORDER — METOCLOPRAMIDE HYDROCHLORIDE 5 MG/ML
10 INJECTION INTRAMUSCULAR; INTRAVENOUS EVERY 6 HOURS PRN
Status: DISCONTINUED | OUTPATIENT
Start: 2017-02-09 | End: 2017-02-10 | Stop reason: HOSPADM

## 2017-02-09 RX ORDER — SODIUM CHLORIDE AND POTASSIUM CHLORIDE 150; 450 MG/100ML; MG/100ML
125 INJECTION, SOLUTION INTRAVENOUS CONTINUOUS
Status: DISCONTINUED | OUTPATIENT
Start: 2017-02-10 | End: 2017-02-10 | Stop reason: HOSPADM

## 2017-02-09 RX ORDER — SODIUM CHLORIDE, SODIUM LACTATE, POTASSIUM CHLORIDE, CALCIUM CHLORIDE 600; 310; 30; 20 MG/100ML; MG/100ML; MG/100ML; MG/100ML
150 INJECTION, SOLUTION INTRAVENOUS CONTINUOUS
Status: DISCONTINUED | OUTPATIENT
Start: 2017-02-09 | End: 2017-02-09 | Stop reason: HOSPADM

## 2017-02-09 RX ORDER — PROMETHAZINE HYDROCHLORIDE 25 MG/1
25 SUPPOSITORY RECTAL ONCE AS NEEDED
Status: DISCONTINUED | OUTPATIENT
Start: 2017-02-09 | End: 2017-02-09 | Stop reason: HOSPADM

## 2017-02-09 RX ORDER — PROPOFOL 10 MG/ML
VIAL (ML) INTRAVENOUS AS NEEDED
Status: DISCONTINUED | OUTPATIENT
Start: 2017-02-09 | End: 2017-02-09 | Stop reason: SURG

## 2017-02-09 RX ORDER — ATRACURIUM BESYLATE 10 MG/ML
INJECTION, SOLUTION INTRAVENOUS AS NEEDED
Status: DISCONTINUED | OUTPATIENT
Start: 2017-02-09 | End: 2017-02-09 | Stop reason: SURG

## 2017-02-09 RX ORDER — LORAZEPAM 2 MG/ML
0.5 INJECTION INTRAMUSCULAR EVERY 12 HOURS PRN
Status: DISCONTINUED | OUTPATIENT
Start: 2017-02-09 | End: 2017-02-10 | Stop reason: HOSPADM

## 2017-02-09 RX ORDER — CYANOCOBALAMIN 1000 UG/ML
1000 INJECTION, SOLUTION INTRAMUSCULAR; SUBCUTANEOUS ONCE
Status: COMPLETED | OUTPATIENT
Start: 2017-02-10 | End: 2017-02-10

## 2017-02-09 RX ORDER — DEXAMETHASONE SODIUM PHOSPHATE 4 MG/ML
INJECTION, SOLUTION INTRA-ARTICULAR; INTRALESIONAL; INTRAMUSCULAR; INTRAVENOUS; SOFT TISSUE AS NEEDED
Status: DISCONTINUED | OUTPATIENT
Start: 2017-02-09 | End: 2017-02-09 | Stop reason: SURG

## 2017-02-09 RX ORDER — HYDROMORPHONE HYDROCHLORIDE 2 MG/1
2 TABLET ORAL EVERY 4 HOURS PRN
Status: DISCONTINUED | OUTPATIENT
Start: 2017-02-09 | End: 2017-02-10 | Stop reason: HOSPADM

## 2017-02-09 RX ORDER — BUPIVACAINE HYDROCHLORIDE 2.5 MG/ML
INJECTION, SOLUTION EPIDURAL; INFILTRATION; INTRACAUDAL AS NEEDED
Status: DISCONTINUED | OUTPATIENT
Start: 2017-02-09 | End: 2017-02-09 | Stop reason: SURG

## 2017-02-09 RX ORDER — ONDANSETRON 2 MG/ML
4 INJECTION INTRAMUSCULAR; INTRAVENOUS EVERY 6 HOURS PRN
Status: DISCONTINUED | OUTPATIENT
Start: 2017-02-09 | End: 2017-02-10 | Stop reason: HOSPADM

## 2017-02-09 RX ORDER — LIDOCAINE HYDROCHLORIDE 10 MG/ML
1 INJECTION, SOLUTION EPIDURAL; INFILTRATION; INTRACAUDAL; PERINEURAL ONCE
Status: COMPLETED | OUTPATIENT
Start: 2017-02-09 | End: 2017-02-09

## 2017-02-09 RX ORDER — GLYCOPYRROLATE 0.2 MG/ML
INJECTION INTRAMUSCULAR; INTRAVENOUS AS NEEDED
Status: DISCONTINUED | OUTPATIENT
Start: 2017-02-09 | End: 2017-02-09 | Stop reason: SURG

## 2017-02-09 RX ORDER — LIDOCAINE HYDROCHLORIDE 10 MG/ML
INJECTION, SOLUTION INFILTRATION; PERINEURAL AS NEEDED
Status: DISCONTINUED | OUTPATIENT
Start: 2017-02-09 | End: 2017-02-09 | Stop reason: SURG

## 2017-02-09 RX ADMIN — BUPIVACAINE HYDROCHLORIDE 60 ML: 2.5 INJECTION, SOLUTION EPIDURAL; INFILTRATION; INTRACAUDAL; PERINEURAL at 13:35

## 2017-02-09 RX ADMIN — SODIUM CHLORIDE, POTASSIUM CHLORIDE, SODIUM LACTATE AND CALCIUM CHLORIDE 150 ML/HR: 600; 310; 30; 20 INJECTION, SOLUTION INTRAVENOUS at 11:52

## 2017-02-09 RX ADMIN — PROPOFOL 20 MCG/KG/MIN: 10 INJECTION, EMULSION INTRAVENOUS at 13:35

## 2017-02-09 RX ADMIN — ATRACURIUM BESYLATE 10 MG: 10 INJECTION, SOLUTION INTRAVENOUS at 14:36

## 2017-02-09 RX ADMIN — Medication 3 G: at 22:01

## 2017-02-09 RX ADMIN — ACETAMINOPHEN 1000 MG: 10 INJECTION, SOLUTION INTRAVENOUS at 20:26

## 2017-02-09 RX ADMIN — PANTOPRAZOLE SODIUM 40 MG: 40 INJECTION, POWDER, FOR SOLUTION INTRAVENOUS at 10:40

## 2017-02-09 RX ADMIN — ONDANSETRON 4 MG: 2 INJECTION INTRAMUSCULAR; INTRAVENOUS at 15:42

## 2017-02-09 RX ADMIN — DEXAMETHASONE SODIUM PHOSPHATE 8 MG: 4 INJECTION, SOLUTION INTRAMUSCULAR; INTRAVENOUS at 13:35

## 2017-02-09 RX ADMIN — ATRACURIUM BESYLATE 40 MG: 10 INJECTION, SOLUTION INTRAVENOUS at 13:35

## 2017-02-09 RX ADMIN — Medication 5 MG: at 15:54

## 2017-02-09 RX ADMIN — TRAZODONE HYDROCHLORIDE 75 MG: 50 TABLET ORAL at 20:26

## 2017-02-09 RX ADMIN — ACETAMINOPHEN 1000 MG: 10 INJECTION, SOLUTION INTRAVENOUS at 15:39

## 2017-02-09 RX ADMIN — MORPHINE SULFATE 4 MG: 4 INJECTION, SOLUTION INTRAMUSCULAR; INTRAVENOUS at 17:42

## 2017-02-09 RX ADMIN — LIDOCAINE HYDROCHLORIDE 50 MG: 10 INJECTION, SOLUTION INFILTRATION; PERINEURAL at 13:35

## 2017-02-09 RX ADMIN — DEXAMETHASONE SODIUM PHOSPHATE 4 MG: 10 INJECTION, SOLUTION INTRAMUSCULAR; INTRAVENOUS at 13:35

## 2017-02-09 RX ADMIN — ATRACURIUM BESYLATE 10 MG: 10 INJECTION, SOLUTION INTRAVENOUS at 15:16

## 2017-02-09 RX ADMIN — SODIUM CHLORIDE, POTASSIUM CHLORIDE, SODIUM LACTATE AND CALCIUM CHLORIDE 1000 ML: 600; 310; 30; 20 INJECTION, SOLUTION INTRAVENOUS at 10:43

## 2017-02-09 RX ADMIN — CEFAZOLIN SODIUM 2 G: 2 INJECTION, SOLUTION INTRAVENOUS at 13:26

## 2017-02-09 RX ADMIN — ROBINUL 0.4 MG: 0.2 INJECTION INTRAMUSCULAR; INTRAVENOUS at 15:54

## 2017-02-09 RX ADMIN — LIDOCAINE HYDROCHLORIDE 1 ML: 10 INJECTION, SOLUTION EPIDURAL; INFILTRATION; INTRACAUDAL; PERINEURAL at 10:42

## 2017-02-09 RX ADMIN — SODIUM CHLORIDE, POTASSIUM CHLORIDE, SODIUM LACTATE AND CALCIUM CHLORIDE: 600; 310; 30; 20 INJECTION, SOLUTION INTRAVENOUS at 13:35

## 2017-02-09 RX ADMIN — PROPOFOL 150 MG: 10 INJECTION, EMULSION INTRAVENOUS at 13:35

## 2017-02-09 RX ADMIN — SODIUM CHLORIDE, POTASSIUM CHLORIDE, SODIUM LACTATE AND CALCIUM CHLORIDE 150 ML/HR: 600; 310; 30; 20 INJECTION, SOLUTION INTRAVENOUS at 17:34

## 2017-02-09 RX ADMIN — SCOPALAMINE 1 PATCH: 1 PATCH, EXTENDED RELEASE TRANSDERMAL at 10:38

## 2017-02-09 RX ADMIN — BUPRENORPHINE HYDROCHLORIDE 0.3 MG: 0.3 INJECTION INTRAMUSCULAR; INTRAVENOUS at 13:35

## 2017-02-09 NOTE — BRIEF OP NOTE
GASTRIC SLEEVE LAPAROSCOPIC, HIATAL HERNIA REPAIR LAPAROSCOPIC  Procedure Note    Denia PINZON Je  2/9/2017    Pre-op Diagnosis:   Morbid obesity with BMI of 40.0-44.9, adult [E66.01, Z68.41]    Post-op Diagnosis:     Post-Op Diagnosis Codes:     * Morbid obesity with BMI of 40.0-44.9, adult [E66.01, Z68.41]     * Hiatal hernia with gastroesophageal reflux [K21.9, K44.9]    Procedure/CPT® Codes:  ND LAP, RODNEY RESTRICT PROC, LONGITUDINAL GASTRECTOMY [73853]  ND LAP,ESOPHAGOGAST FUNDOPLASTY [38102]  ND ESOPHAGOGASTRODUODENOSCOPY TRANSORAL DIAGNOSTIC [86403]    Procedure(s):  GASTRIC SLEEVE LAPAROSCOPIC  HIATAL HERNIA REPAIR LAPAROSCOPIC    Surgeon(s):  MD Asst Shyann Das MD    Anesthesia: General with Block    Staff:   Circulator: Ave Dillard RN; Roxy Mckinnon RN  Scrub Person: Jose Hawkins; Rose Lim  Nursing Assistant: Mo Garcia  Orientee: Argentina Zimmerman RN    Estimated Blood Loss: 35 mL    Specimens:                  ID Type Source Tests Collected by Time Destination   A : subtotal gastrectomy Tissue Gastric, Body TISSUE EXAM Johnathan Burkett MD 2/9/2017 1503          Drains:           Findings: Extreme adhesions, tech very diff, 2 hrs, HHR 39 minutes    Complications: none      Johnathan Burkett MD     Date: 2/9/2017  Time: 3:53 PM

## 2017-02-09 NOTE — ANESTHESIA PROCEDURE NOTES
Airway  Urgency: elective    Date/Time: 2/9/2017 1:35 PM  End Time:2/9/2017 1:35 PM  Airway not difficult    General Information and Staff    Patient location during procedure: OR  CRNA: WENDY BLACKMAN    Indications and Patient Condition  Indications for airway management: airway protection    Preoxygenated: yes  MILS not maintained throughout  Mask difficulty assessment: 1 - vent by mask    Final Airway Details  Final airway type: endotracheal airway      Successful airway: ETT  Cuffed: yes   Successful intubation technique: direct laryngoscopy  Endotracheal tube insertion site: oral  Blade: Amira  Blade size: #3  ETT size: 7.0 mm  Cormack-Lehane Classification: grade I - full view of glottis  Placement verified by: chest auscultation and capnometry   Cuff volume (mL): 5  Measured from: lips  ETT to lips (cm): 20  Number of attempts at approach: 1    Additional Comments  Negative epigastric sounds, Breath sound equal bilaterally with symmetric chest rise and fall

## 2017-02-09 NOTE — ANESTHESIA PREPROCEDURE EVALUATION
Anesthesia Evaluation        Airway   Mallampati: I  TM distance: <3 FB  Neck ROM: full  no difficulty expected  Dental - normal exam     Pulmonary - normal exam   (+) shortness of breath, sleep apnea,   Cardiovascular - normal exam    (+) hypertension,       Neuro/Psych  GI/Hepatic/Renal/Endo    (+) obesity, morbid obesity, chronic renal disease, diabetes mellitus,     Musculoskeletal     Abdominal  - normal exam    Bowel sounds: normal.   Substance History      OB/GYN          Other                                  Anesthesia Plan    ASA 3     general and regional     intravenous induction   Anesthetic plan and risks discussed with patient.

## 2017-02-09 NOTE — ANESTHESIA POSTPROCEDURE EVALUATION
Patient: Denia Wooten    Procedure Summary     Date Anesthesia Start Anesthesia Stop Room / Location    02/09/17 1326 1605  [facility] / [facility]       Procedure Diagnosis Surgeon Provider    GASTRIC SLEEVE LAPAROSCOPIC (N/A Abdomen); HIATAL HERNIA REPAIR LAPAROSCOPIC (N/A Abdomen) Morbid obesity with BMI of 40.0-44.9, adult; Hiatal hernia with gastroesophageal reflux  (Morbid obesity with BMI of 40.0-44.9, adult [E66.01, Z68.41]) MD Lei Das MD          Anesthesia Type: general, regional  Last vitals  BP      Temp      Pulse     Resp      SpO2        Post Anesthesia Care and Evaluation    Patient location during evaluation: PACU  Patient participation: complete - patient participated  Level of consciousness: awake and alert  Pain score: 0  Pain management: adequate  Airway patency: patent  Anesthetic complications: No anesthetic complications  PONV Status: none  Cardiovascular status: hemodynamically stable and acceptable  Respiratory status: nonlabored ventilation, acceptable and nasal cannula  Hydration status: acceptable

## 2017-02-09 NOTE — ANESTHESIA PROCEDURE NOTES
"Peripheral Block    Patient location during procedure: pre-op  Start time: 2/9/2017 1:35 PM  Stop time: 2/9/2017 1:40 PM  Reason for block: at surgeon's request and post-op pain management  Performed by  CRNA: WENDY BLACKMAN  Preanesthetic Checklist  Completed: patient identified, site marked, surgical consent, pre-op evaluation, timeout performed, IV checked, risks and benefits discussed and monitors and equipment checked  Peripheral Block Prep:  Sterile barriers:cap, gloves, sterile barriers and mask  Prep: ChloraPrep  Patient monitoring: blood pressure monitoring, continuous pulse oximetry and EKG  Peripheral Procedure  Nursing cardiac assessment comments yes: Sedation, GA, Spinal,Epidural   Guidance:ultrasound guided  Images:still images obtained  Laterality:BilateralBlock Type:TAP  Injection Technique:single-shotNeedle Type:short-bevel  Needle Gauge:20 G  Needle gauge: 20g 4\" Stimuplex.   Medications  Comment:Block Injection:  LA dose divided between Right and Left block       Adjuncts:  Decadron 4mg PSF, Buprenex 0.3mg (Per total volume of LA)  Local Injected:bupivacaine 0.25% without epinephrine Local Amount Injected:60mL  Post Assessment  Patient Tolerance:comfortable throughout block  Complications:no  Additional Notes  The pt was placed in the Supine Position and was  anesthetized with:       General Anesthesia     Under Ultrasound guidance, a BBraun 4inch 360 degree needle was advanced with Normal Saline hydro dissection of tissue.  The Internal Oblique and Transversus Abdominus muscles where visualized.  At or before the aponeurosis of Internal Oblique, local anesthetic spread was visualized in the Transversus Abdominus Plane. Injection was made incrementally with aspiration every 5 mls.  There was no  intravascular injection,  injection pressure was normal, there was no neural injection, and the procedure was completed without difficulty.  Thank You.            "

## 2017-02-09 NOTE — H&P (VIEW-ONLY)
"[facility]  [address]  [facility] [id number] [phone]        Patient Name: Denia Wooten.  : 1965      Date of Visit: 2017      Reason for Visit:  Weight gain, pursuing LSG.  Preop today for LSG.    HPI:  Denia Wooten is a 51 y.o. female s/p LAGB APS  GDW, replaced w/ APL  GDW for band slip, followed by AGBR 2016 d/t band intolerance w/ chronic dysphagia/N/V.  All issues resolved w/ band removal, but unfortunately she remains \"starved\" all the time and is really struggling w/ portion control.  She gained 24 lbs the 1st 3 months following band removal.   She is really working on making healthy choices but feels it is hopeless.  Eating 3 meals/day w/ 2 snacks.  Drinking water and tea/coffee w/ sweetener.  Bought an ellipitical but has only been able to use it once/week d/t worsening knee pain.  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  Frustrated and struggling w/ the inability to lose and maintain her weight w/out the lapband.     Returns for final visit prior to LSG.  Says no probs w AGBR .      Past Medical History   Diagnosis Date   • Abnormal chest xray      decreased lung volumes   • Allergic rhinitis    • Diabetes mellitus      borderline   • Dyspepsia    • Dyspnea on exertion    • Fatigue    • Hypercholesteremia      per prim note, no meds   • Hypertension      Meds in the past.  171/92 today   • Insomnia    • Joint pain    • Nephrolithiasis    • Normocytic anemia      12.4/37.2   • Obesity    • Obstructive sleep apnea      CPAP says noncompliant - marlin her/claustrophobic   • Osteoarthritis of both knees      per prim.  On diclofenac daily, prn Aleve   • Peripheral edema    • Restless leg syndrome      Past Surgical History   Procedure Laterality Date   • Colonoscopy       unremarkable   • Gastric banding       s/p LAGB APS  GDW, replaced w/ APL  GDW for band slip, followed by AGBR 2016 d/t band " intolerance.   • Kidney stone surgery  2013     Current Outpatient Prescriptions:   •  diclofenac-misoprostol (ARTHROTEC 75) 75-0.2 MG EC tablet, Take 1 tablet by mouth 2 (Two) Times a Day., Disp: , Rfl:   •  melatonin 5 MG tablet tablet, Take 5 mg by mouth., Disp: , Rfl:   •  montelukast (SINGULAIR) 10 MG tablet, Take 10 mg by mouth Every Night., Disp: , Rfl:   •  traZODone (DESYREL) 150 MG tablet, , Disp: , Rfl:     Allergies   Allergen Reactions   • Sulfa Antibiotics Rash     Family History   Problem Relation Age of Onset   • Hypertension Mother    • Diabetes Mother    • Hypertension Father    • Sleep apnea Father    • Stroke Father    • Cancer Maternal Grandmother    • Cancer Maternal Grandfather    • Hypertension Maternal Grandfather    • Diabetes Maternal Grandfather    • Stroke Maternal Grandfather    • Heart attack Paternal Grandfather        Social History     Social History   • Marital status:      Spouse name: N/A   • Number of children: N/A   • Years of education: N/A     Occupational History   •  - walking route      Social History Main Topics   • Smoking status: Former Smoker     Packs/day: 1.50     Years: 25.00     Quit date: 2001   • Smokeless tobacco: Never Used   • Alcohol use Yes      Comment: wine on occasion   • Drug use: No   • Sexual activity: Not on file      Comment:      Other Topics Concern   • Not on file     Social History Narrative    Lives in [place], [place] w/ .     Review Of Systems   Systemic: Appetite change, feeling tired (fatigue), recent weight gain, and Night sweats.  Eyes: Wears glasses.  Otolaryngeal: Chronic allergies.  Breasts: No mammogram concerns. Patient has had 0 pregnancies previously and patient has 0 child(candido).  Cardiovascular: No hypertension and no ischemic heart disease. Edema. No deep venous thrombosis.  Pulmonary: No asthma. Suffers from sleep apnea. No pulmonary embolism. Dyspnea during exertion.  Gastrointestinal: No  "heartburn. No liver disease, no irritable bowel, no pancreatic disease, and no gallbladder problems.  Genitourinary: Kidney stones.  Endocrine: No dyslipidemia and no hypothyroidism. Glucose Intolerance. No hyperthyroidism.  Hematologic: No bleeding disorder and no prior blood transfusions.  Musculoskeletal: No fibromyalgia. Knee joint pain and bone pain in the foot.  Neurological: No seizures and no strokes. Legs feel restless.  Psychological: No anxiety and no depression. Insomnia. No bipolar disorder.  Skin: No history of MRSA skin infections.  All other systems reviewed/negative.    Visit Vitals   • /92 (BP Location: Left arm, Patient Position: Sitting)   • Pulse 70   • Temp 98.1 °F (36.7 °C)   • Ht 63\" (160 cm)   • Wt 229 lb 8 oz (104 kg)   • BMI 40.65 kg/m2     Physical Exam   Constitutional: She is oriented to person, place, and time. She appears well-developed and well-nourished.   HENT:   Head: Normocephalic and atraumatic.   Eyes: Conjunctivae and EOM are normal. No scleral icterus.   Neck: Neck supple. Carotid bruit is not present. No thyromegaly present.   Cardiovascular: Normal rate and regular rhythm.    No murmur heard.  Pulmonary/Chest: Effort normal and breath sounds normal. No respiratory distress. She has no wheezes. She has no rales.   Abdominal: Soft. Bowel sounds are normal. She exhibits no distension and no mass. There is no hepatosplenomegaly. There is no tenderness. No hernia.       Scars: old band, 3 AGBR scars   Musculoskeletal: Normal range of motion. She exhibits no edema.   Neurological: She is alert and oriented to person, place, and time.   Skin: Skin is warm and dry.   Psychiatric: She has a normal mood and affect. Judgment normal.   Vitals reviewed.        Assessment:  52 y/o female s/p lapband removal w/ medically complicated obesity pursuing revision to gastric sleeve.      Weight Loss Surgery is deemed medically necessary given the following obesity related comorbidities: " "There is no height or weight on file to calculate BMI., Diabetes, and Sleep Apnea.     Patient is aware that surgery is a tool, and that weight loss is not guaranteed but only seen in the context of appropriate use, follow up and exercise.    Complications  of laparoscopic/possible robotic gastric sleeve were discussed. The patient is well aware of the potential complications of surgery that include but not limited to bleeding, infections, deep venous thrombosis, pulmonary embolism, pulmonary complications such as pneumonia, cardiac events, hernias, small bowel obstruction, damage to the spleen or other organs, bowel injury, disfiguring scars, failure to lose weight, need for additional surgery, conversion to an open procedure, and death. Patient is also aware of complications which apply in this particular procedure that can include but are not limited to a \"leak\" at the staple line which in some instances may require conversion to gastric bypass.    Greater than 10 minutes was spent with the patient discussing avoiding all tobacco products and second hand smoke at least 2 weeks pre-operatively and 6 weeks post-operatively to minimize the risk of sleeve leak     Discussed the risks, benefits and alternative therapies at great length as outlined in our extensive consent forms, consent videos, and educational teaching process under the direction of the center's .    A copy of the patient's signed informed consent is on file.    Plan:  • The following procedure is being planned: Gastric Sleeve.  • Desired Location for Procedure: [facility].  • Letter of support obtained from primary care physician.  • Psychiatric evaluation complete.  • Cardiac clearance obtained.  • Patient was advised to start a high protein, low carbohydrate diet..  • Information on JOYCE educational video was given to the patient This is an internet based educational video which explains the surgical procedure and " answers basic questions regarding the procedure.    This will be the 4th surgery in the area (AGB, AGB replacement, AGBR, now LSG).  Pt is aware that LSG after prev AGB/AGBR carries increased risk over primary LSG alone, samuel wrt bleeding, infection, leak, prolonged OR times with incr risk pulm complications and VTE, etc and still wishes to proceed    Johnathan Burkett MD

## 2017-02-10 ENCOUNTER — APPOINTMENT (OUTPATIENT)
Dept: GENERAL RADIOLOGY | Facility: HOSPITAL | Age: 52
End: 2017-02-10
Attending: SURGERY

## 2017-02-10 VITALS
HEIGHT: 63 IN | SYSTOLIC BLOOD PRESSURE: 168 MMHG | DIASTOLIC BLOOD PRESSURE: 96 MMHG | WEIGHT: 229 LBS | TEMPERATURE: 98 F | BODY MASS INDEX: 40.57 KG/M2 | RESPIRATION RATE: 18 BRPM | OXYGEN SATURATION: 96 % | HEART RATE: 87 BPM

## 2017-02-10 LAB
ALBUMIN SERPL-MCNC: 4 G/DL (ref 3.2–4.8)
ALBUMIN/GLOB SERPL: 1.8 G/DL (ref 1.5–2.5)
ALP SERPL-CCNC: 67 U/L (ref 25–100)
ALT SERPL W P-5'-P-CCNC: 162 U/L (ref 7–40)
ANION GAP SERPL CALCULATED.3IONS-SCNC: 7 MMOL/L (ref 3–11)
AST SERPL-CCNC: 126 U/L (ref 0–33)
BASOPHILS # BLD AUTO: 0 10*3/MM3 (ref 0–0.2)
BASOPHILS NFR BLD AUTO: 0 % (ref 0–1)
BILIRUB SERPL-MCNC: 0.4 MG/DL (ref 0.3–1.2)
BUN BLD-MCNC: 9 MG/DL (ref 9–23)
BUN/CREAT SERPL: 18 (ref 7–25)
CALCIUM SPEC-SCNC: 9 MG/DL (ref 8.7–10.4)
CHLORIDE SERPL-SCNC: 103 MMOL/L (ref 99–109)
CO2 SERPL-SCNC: 28 MMOL/L (ref 20–31)
CREAT BLD-MCNC: 0.5 MG/DL (ref 0.6–1.3)
DEPRECATED RDW RBC AUTO: 43.3 FL (ref 37–54)
EOSINOPHIL # BLD AUTO: 0 10*3/MM3 (ref 0.1–0.3)
EOSINOPHIL NFR BLD AUTO: 0 % (ref 0–3)
ERYTHROCYTE [DISTWIDTH] IN BLOOD BY AUTOMATED COUNT: 12.6 % (ref 11.3–14.5)
GFR SERPL CREATININE-BSD FRML MDRD: 130 ML/MIN/1.73
GLOBULIN UR ELPH-MCNC: 2.2 GM/DL
GLUCOSE BLD-MCNC: 139 MG/DL (ref 70–100)
GLUCOSE BLDC GLUCOMTR-MCNC: 103 MG/DL (ref 70–130)
GLUCOSE BLDC GLUCOMTR-MCNC: 117 MG/DL (ref 70–130)
GLUCOSE BLDC GLUCOMTR-MCNC: 98 MG/DL (ref 70–130)
HCT VFR BLD AUTO: 37 % (ref 34.5–44)
HGB BLD-MCNC: 12.2 G/DL (ref 11.5–15.5)
IMM GRANULOCYTES # BLD: 0.01 10*3/MM3 (ref 0–0.03)
IMM GRANULOCYTES NFR BLD: 0.1 % (ref 0–0.6)
IRON 24H UR-MRATE: 24 MCG/DL (ref 50–175)
LYMPHOCYTES # BLD AUTO: 0.82 10*3/MM3 (ref 0.6–4.8)
LYMPHOCYTES NFR BLD AUTO: 8.2 % (ref 24–44)
MCH RBC QN AUTO: 31.3 PG (ref 27–31)
MCHC RBC AUTO-ENTMCNC: 33 G/DL (ref 32–36)
MCV RBC AUTO: 94.9 FL (ref 80–99)
MONOCYTES # BLD AUTO: 0.61 10*3/MM3 (ref 0–1)
MONOCYTES NFR BLD AUTO: 6.1 % (ref 0–12)
NEUTROPHILS # BLD AUTO: 8.58 10*3/MM3 (ref 1.5–8.3)
NEUTROPHILS NFR BLD AUTO: 85.6 % (ref 41–71)
NRBC BLD MANUAL-RTO: 0 /100 WBC (ref 0–0)
PLATELET # BLD AUTO: 305 10*3/MM3 (ref 150–450)
PMV BLD AUTO: 9.6 FL (ref 6–12)
POTASSIUM BLD-SCNC: 4.4 MMOL/L (ref 3.5–5.5)
PROT SERPL-MCNC: 6.2 G/DL (ref 5.7–8.2)
RBC # BLD AUTO: 3.9 10*6/MM3 (ref 3.89–5.14)
SODIUM BLD-SCNC: 138 MMOL/L (ref 132–146)
WBC NRBC COR # BLD: 10.02 10*3/MM3 (ref 3.5–10.8)

## 2017-02-10 PROCEDURE — 99024 POSTOP FOLLOW-UP VISIT: CPT | Performed by: SURGERY

## 2017-02-10 PROCEDURE — 74241: CPT

## 2017-02-10 PROCEDURE — 25010000002 NA FERRIC GLUC CPLX PER 12.5 MG: Performed by: SURGERY

## 2017-02-10 PROCEDURE — 25810000003 POTASSIUM CHLORIDE PER 2 MEQ: Performed by: SURGERY

## 2017-02-10 PROCEDURE — 25510000001 DIATRIZOATE MEGLUMINE & SODIUM PER 1 ML: Performed by: SURGERY

## 2017-02-10 PROCEDURE — 25010000002 PYRIDOXINE PER 100 MG: Performed by: SURGERY

## 2017-02-10 PROCEDURE — 25010000002 ENOXAPARIN PER 10 MG: Performed by: SURGERY

## 2017-02-10 PROCEDURE — 85025 COMPLETE CBC W/AUTO DIFF WBC: CPT | Performed by: SURGERY

## 2017-02-10 PROCEDURE — 94799 UNLISTED PULMONARY SVC/PX: CPT

## 2017-02-10 PROCEDURE — 83540 ASSAY OF IRON: CPT | Performed by: SURGERY

## 2017-02-10 PROCEDURE — 82962 GLUCOSE BLOOD TEST: CPT

## 2017-02-10 PROCEDURE — 80053 COMPREHEN METABOLIC PANEL: CPT | Performed by: SURGERY

## 2017-02-10 PROCEDURE — 25010000002 THIAMINE PER 100 MG: Performed by: SURGERY

## 2017-02-10 PROCEDURE — 25010000002 CYANOCOBALAMIN PER 1000 MCG: Performed by: SURGERY

## 2017-02-10 RX ORDER — ONDANSETRON 4 MG/1
4 TABLET, ORALLY DISINTEGRATING ORAL EVERY 8 HOURS PRN
Qty: 20 TABLET | Refills: 0 | Status: SHIPPED | OUTPATIENT
Start: 2017-02-10 | End: 2017-05-23

## 2017-02-10 RX ORDER — HYDROCODONE BITARTRATE AND ACETAMINOPHEN 7.5; 325 MG/1; MG/1
1 TABLET ORAL EVERY 6 HOURS PRN
Qty: 30 TABLET | Refills: 0 | Status: SHIPPED | OUTPATIENT
Start: 2017-02-10 | End: 2017-05-23

## 2017-02-10 RX ORDER — OMEPRAZOLE 20 MG/1
20 CAPSULE, DELAYED RELEASE ORAL DAILY
Qty: 60 CAPSULE | Refills: 1 | Status: SHIPPED | OUTPATIENT
Start: 2017-02-10 | End: 2017-06-20 | Stop reason: SDUPTHER

## 2017-02-10 RX ADMIN — ACETAMINOPHEN 1000 MG: 10 INJECTION, SOLUTION INTRAVENOUS at 11:30

## 2017-02-10 RX ADMIN — HYDROMORPHONE HYDROCHLORIDE 2 MG: 2 TABLET ORAL at 11:33

## 2017-02-10 RX ADMIN — Medication 3 G: at 05:42

## 2017-02-10 RX ADMIN — POTASSIUM CHLORIDE AND SODIUM CHLORIDE 125 ML/HR: 450; 150 INJECTION, SOLUTION INTRAVENOUS at 11:30

## 2017-02-10 RX ADMIN — ENOXAPARIN SODIUM 40 MG: 40 INJECTION SUBCUTANEOUS at 08:07

## 2017-02-10 RX ADMIN — DIATRIZOATE MEGLUMINE AND DIATRIZOATE SODIUM 30 ML: 660; 100 LIQUID ORAL; RECTAL at 09:05

## 2017-02-10 RX ADMIN — THIAMINE HYDROCHLORIDE 250 ML/HR: 100 INJECTION, SOLUTION INTRAMUSCULAR; INTRAVENOUS at 07:41

## 2017-02-10 RX ADMIN — CYANOCOBALAMIN 1000 MCG: 1000 INJECTION, SOLUTION INTRAMUSCULAR; SUBCUTANEOUS at 07:41

## 2017-02-10 RX ADMIN — HYDROMORPHONE HYDROCHLORIDE 2 MG: 2 TABLET ORAL at 07:41

## 2017-02-10 RX ADMIN — SODIUM CHLORIDE 125 MG: 9 INJECTION, SOLUTION INTRAVENOUS at 14:07

## 2017-02-10 RX ADMIN — ACETAMINOPHEN 1000 MG: 10 INJECTION, SOLUTION INTRAVENOUS at 04:14

## 2017-02-10 RX ADMIN — PANTOPRAZOLE SODIUM 40 MG: 40 INJECTION, POWDER, FOR SOLUTION INTRAVENOUS at 05:42

## 2017-02-10 NOTE — PLAN OF CARE
Problem: Patient Care Overview (Adult)  Goal: Plan of Care Review  Outcome: Ongoing (interventions implemented as appropriate)    02/09/17 1900   Coping/Psychosocial Response Interventions   Plan Of Care Reviewed With patient;spouse;family   Patient Care Overview   Progress progress toward functional goals as expected       Goal: Discharge Needs Assessment    02/09/17 1900   Discharge Needs Assessment   Concerns To Be Addressed no discharge needs identified   Readmission Within The Last 30 Days no previous admission in last 30 days   Current Health   Anticipated Changes Related to Illness none   Living Environment   Transportation Available car

## 2017-02-10 NOTE — PROGRESS NOTES
Discharge Planning Assessment  [facility]     Patient Name: Denia Wooten  MRN: [id number]  Today's Date: 2/10/2017    Admit Date: 2/9/2017          Discharge Needs Assessment       02/10/17 1447    Living Environment    Lives With spouse    Living Arrangements house    Provides Primary Care For no one    Quality Of Family Relationships unable to assess    Able to Return to Prior Living Arrangements yes    Discharge Needs Assessment    Concerns To Be Addressed no discharge needs identified    Readmission Within The Last 30 Days no previous admission in last 30 days    Anticipated Changes Related to Illness none    Equipment Currently Used at Home none    Equipment Needed After Discharge none    Transportation Available car;family or friend will provide    Discharge Disposition home or self-care    Discharge Contact Information if Applicable Brianne Garsia, sister  [phone]  Rachelle Taylor, sister  [phone]            Discharge Plan       02/10/17 1447    Case Management/Social Work Plan    Plan Home    Patient/Family In Agreement With Plan yes    Additional Comments Spoke with patient at bedside regarding discharge planning.  Patient denies use of Home health or DME.  Denies difficulty affording medications.  Patient lives with her  in a single level home.  No anticipated discharge needs.  Patient goal is to discharge home via car with family to transport when medically ready.         Discharge Placement     No information found        Expected Discharge Date and Time     Expected Discharge Date Expected Discharge Time    Feb 10, 2017               Demographic Summary       02/10/17 1445    Referral Information    Admission Type inpatient    Referral Source admission list    Reason For Consult discharge planning    Record Reviewed clinical discipline documentation;history and physical;patient profile    Primary Care Physician Information    Name Mariel Lima MD            Functional Status        02/10/17 1446    Functional Status Current    Current Functional Level Comment Per Nursing Assessment    Functional Status Prior    Ambulation 0-->independent    Transferring 0-->independent    Toileting 0-->independent    Bathing 0-->independent    Dressing 0-->independent    Eating 0-->independent    Communication 0-->understands/communicates without difficulty    Swallowing 0-->swallows foods/liquids without difficulty    IADL    Medications independent    Meal Preparation independent    Housekeeping independent    Laundry independent    Shopping independent    Oral Care independent    Employment/Financial    Employment/Finance Comments [employer]            Psychosocial     None            Abuse/Neglect     None            Legal     None            Substance Abuse     None            Patient Forms     None          Dyana Martínez, RN

## 2017-02-10 NOTE — OP NOTE
DATE OF PROCEDURE:  02/09/2017    PREOPERATIVE DIAGNOSIS: Morbid obesity with multiple comorbidities, status post laparoscopic Lap-Band placement x2 and laparoscopic Lap-Band removal in 09/2016.    POSTOPERATIVE DIAGNOSES:  1. Morbid obesity with multiple comorbidities, status post laparoscopic Lap-Band placement x2 and laparoscopic Lap-Band removal in 09/2016.  2. Hiatal hernia.     PROCEDURES PERFORMED:  1. Laparoscopic sleeve gastrectomy (85% subtotal vertical gastrectomy) over a 36-German bougie dilator.   2. Laparoscopic hiatal hernia repair.   3. Esophagogastroduodenoscopy.   4. Extensive lysis of adhesions.     SURGEON: Johnathan Burkett MD    ASSISTANT: Shyann Sotelo MD     ANESTHESIA: General endotracheal.     ESTIMATED BLOOD LOSS: 50 mL.     FLUIDS: Crystalloid.     SPECIMENS: Subtotal gastrectomy.     DRAINS: None.     COUNTS: Correct.     COMPLICATIONS: None.     INDICATIONS: This is a 51-year-old morbidly obese white female well known to me status post originally laparoscopic Lap-Band AP standard placement in 2007. I removed the band and replaced it for a slip in 2010 for intolerance of the band. I removed it in 09/2016. She presents now for elective revision to a sleeve gastrectomy. She is aware that sleeve gastrectomy after previous bands and band removal carry increased risk over primary sleeve alone, especially with respect to bleeding, infection, leak, prolonged operative times with increased risk of pulmonary complications and venous thromboembolic events, etc. and wishes to proceed.     FINDINGS: This was a technically very difficult operation. The liver was plastered to the lesser omentum and area of the previous banding. This was a very technically challenging hiatal hernia repair. Operative time was 2 hours and hiatal hernia repair required 39 minutes.     DESCRIPTION OF PROCEDURE: The patient was brought to the operating room and placed supine upon the operating room table. [facility] hoses  were placed. She underwent uneventful general endotracheal anesthesia per the anesthesiology staff. She received IV Ancef and subcutaneous Lovenox. The anesthesiology staff performed a TAP block and her abdomen was prepped and draped with ChloraPrep in a sterile fashion. An Ioban was used as well. I initially attempted to enter the peritoneal cavity in the upper abdomen to the left of midline using an old laparoscopy scar using a 5 mm trocar and utilizing an Optiview technique; however, I encountered adhesions. I then used the medial most aspect of the previous port site incision and entered the peritoneal cavity in an area surrounded by scar tissue but close enough where I could see around it and successfully place an additional 5 mm trocar under direct visualization in the right upper quadrant. There were noted to be fairly extensive adhesions of the omentum to the left upper quadrant area of the previous port and also of the stomach to the left lobe of the liver. The left lobe of liver was very elongated and extended over the spleen. There were adhesions of the omentum to the subdiaphragmatic space as well. These were photo documented. Under direct visualization, a 15 mm trocar was placed to the right of the umbilicus off the midline. The adhesions in the left upper quadrant were lysed with the Harmonic scalpel and a 5 mm trocar was placed through the original entry site and another in the left lateral subcostal position. I began trying to divide adhesions of the lesser omentum and stomach to the undersurface of the left lobe of the liver, but this was extremely tedious. The capsule tore during the process and throughout the case FloSeal and Ray-Seferino were needed although there was no significant or uncontrolled bleeding. The gallbladder incidentally was visualized and appeared normal. This was photo documented. Quite a bit of time was spent trying to separate the stomach from the liver. This was almost impossible.  There were no good planes. At this point, I began taking down the gastrocolic vessels with the Harmonic scalpel. This proceeded proximally taking down the short gastric vessels up to the left diaphragm. I then divided the gastrocolic vessels medially to 5 cm proximal to the pylorus. With the remaining stomach mobilized, quite a bit of time was then spent taking down remaining attachments of the cardia to the left diaphragm and ultimately exposing the left veronika along its length. This did require also dissecting free the left lobe of the liver from the lesser omentum and the area of the hiatus, but I could not completely free up those adhesions at this time. Upon completing posterior dissection, there was noted to be a small splenic infarct at the inferior superior pole of the spleen. This was photo documented. There was clearly cardia herniating into the mediastinum. Photo documentation was obtained. I incised the hernia sac along the base of the left veronika and this was extended up and across the phrenoesophageal membrane. At this point, I managed somehow to lift the liver and dissect it free from the lesser omentum and the hiatus. This was a very tedious process. I was then able to incise the hernia sac along the base of the right veronika and extend this up and across the phrenoesophageal membrane as well. The GE junction was then lengthened to well below the level of the crura by dissecting areolar tissue well into the mediastinum. A half-inch Penrose drain was used for esophageal retraction. The anterior and posterior vagus nerves were preserved. The crura were dissected to their meeting point inferiorly. The hiatal hernia repair was then performed posteriorly with 3 interrupted 0 silk sutures with good result. Photo documentation was obtained before and after repair. The Penrose drain was removed. There was not significant anterior imbrications noted that would be in the way of the sleeve staple line. The  anesthesiology staff passed a 36-Spanish blunt tip bougie dilator which was manipulated along the lesser curvature into the distal antrum. The 85% subtotal vertical sleeve gastrectomy was then performed over the 36-Spanish bougie dilator using an [place] 60 mm articulating electric GST stapler. The first firing was a black load. The next 5 firings were green loads. All included a single absorbable Veritas Chasity-Strip. After clamping down the final green load and prior to firing it, the bougie dilator was removed. The sleeve was performed such that it was uniform in size, no hourglassing or narrowing, especially at the angularis, and the final firing was done a centimeter away from the angle of His to hopefully avoid incorporating esophageal fibers. The subtotal gastrectomy specimen was placed into a large retrieval bag and withdrawn and placed on a separate [place] stand. It was a smaller than average specimen. The sleeve was submerged under saline. I performed upper endoscopy. I advanced the endoscope into the duodenal bulb. No air bubbles or leaks were seen. No bleeding from the staple line, no narrowing at the angularis, no hiatal hernia or Quarles's esophagus. No redundant cardia from previous banding and the endoscope was withdrawn. I inspected the subtotal gastrectomy specimen. Staples were all well-formed confirming laparoscopic findings. It was sent unopened to pathology for permanent section. Irrigation fluid was suctioned free. The sleeve was resting nicely and hemostatic. Photo documentation of the sleeve was obtained. The sleeve staple line along with the hiatus and the undersurface of the left lobe of the liver were all treated with a total of 10 mL of aerosolized Tisseel fibrin glue forming a nice seal in all surfaces. Photo documentation was obtained. Ray-Seferino had all been removed and counts were correct. Hemostasis had been excellent for quite some time and the Ray-Seferino was removed well prior to completion  of the sleeve gastrectomy and upper endoscopy. The Syd retractor was removed. Fascia at the 15 mm trocar site incision was closed with a horizontal mattress 0 Vicryl suture placed with a suture passer under direct visualization and tying the knot extracorporeally. Fascia was infiltrated with approximately 10 mL of 0.25% Marcaine with epinephrine with the okay from anesthesia. The remaining trocars were removed under direct visualization with no bleeding noted from their sites. Subcutaneous tissue in the 15 mm incision was closed with a figure-of-eight 2-0 Vicryl Plus suture and the skin in each incision was closed using 3-0 Monocryl Plus in an interrupted subcuticular stitch followed by Skin Affix. The patient tolerated the procedure well without complication and was taken to the recovery room in stable condition.      MD ROSEMARY Das/price  DD: 02/09/2017 16:06:32  DT: 02/09/2017 19:11:38  Voice Rec. ID #[id number]  Voice Original ID #[id number]  Doc ID #[id number]  Rev. #0  cc:

## 2017-02-10 NOTE — PLAN OF CARE
Problem: Patient Care Overview (Adult)  Goal: Plan of Care Review  Outcome: Ongoing (interventions implemented as appropriate)    02/10/17 0449   Coping/Psychosocial Response Interventions   Plan Of Care Reviewed With patient   Patient Care Overview   Progress improving   Outcome Evaluation   Outcome Summary/Follow up Plan pt rested well and states pain is undercontrol. pt ambulating in dave and using incintive spirometer        Goal: Adult Individualization and Mutuality  Outcome: Ongoing (interventions implemented as appropriate)  Goal: Discharge Needs Assessment  Outcome: Ongoing (interventions implemented as appropriate)    Problem: Bariatric Surgery (Open/Laparoscopic) (Adult,Pediatric)  Goal: Signs and Symptoms of Listed Potential Problems Will be Absent or Manageable (Bariatric Surgery)  Outcome: Ongoing (interventions implemented as appropriate)

## 2017-02-10 NOTE — PROGRESS NOTES
"Bariatric Surgery Progress Note:      Chief Complaint:  POD #1 LSG/HHR    Subjective     Interval History:  Doing well.  No complaints.  Pain controlled.  Denies N/V.  No fevers.  Ambulating.  Voiding.  IS 1500.  Was on oxygen till this afternoon, but is off now and satting well.  She has gotten 30 g of protein so far today.    Objective     Vital Signs  Blood pressure 152/82, pulse 87, temperature 98.2 °F (36.8 °C), temperature source Oral, resp. rate 18, height 63\" (160 cm), weight 229 lb (104 kg), SpO2 96 %.      Intake/Output Summary (Last 24 hours) at 02/10/17 1627  Last data filed at 02/10/17 1230   Gross per 24 hour   Intake   1000 ml   Output   2300 ml   Net  -1300 ml       Physical Exam:  General: Alert, NAD  Lungs: Clear  Heart: RRR  Abdomen: soft, appropriate, incisions okay, scant BS  Extremities: warm, (+) SCDs       Labs:  Lab Results (last 24 hours)     Procedure Component Value Units Date/Time    POC Glucose Fingerstick [[id number]]  (Abnormal) Collected:  02/09/17 2218    Specimen:  Blood Updated:  02/09/17 2219     Glucose 160 (H) mg/dL     Narrative:       Meter: [id number] : [id number] Masoud Díazh    Comprehensive Metabolic Panel [[id number]]  (Abnormal) Collected:  02/10/17 0441    Specimen:  Blood Updated:  02/10/17 0539     Glucose 139 (H) mg/dL      BUN 9 mg/dL      Creatinine 0.50 (L) mg/dL      Sodium 138 mmol/L      Potassium 4.4 mmol/L      Chloride 103 mmol/L      CO2 28.0 mmol/L      Calcium 9.0 mg/dL      Total Protein 6.2 g/dL      Albumin 4.00 g/dL      ALT (SGPT) 162 (H) U/L      AST (SGOT) 126 (H) U/L      Alkaline Phosphatase 67 U/L      Total Bilirubin 0.4 mg/dL      eGFR Non African Amer 130 mL/min/1.73      Globulin 2.2 gm/dL      A/G Ratio 1.8 g/dL      BUN/Creatinine Ratio 18.0      Anion Gap 7.0 mmol/L     Narrative:       National Kidney [employer] Guidelines    Stage                           Description                             GFR                      1              "                  Normal or High                          90+  2                               Mild decrease                            60-89  3                               Moderate decrease                   30-59  4                               Severe decrease                       15-29  5                               Kidney failure                             <15    Iron [[id number]]  (Abnormal) Collected:  02/10/17 0441    Specimen:  Blood Updated:  02/10/17 0539     Iron 24 (L) mcg/dL     POC Glucose Fingerstick [[id number]]  (Normal) Collected:  02/10/17 0717    Specimen:  Blood Updated:  02/10/17 0718     Glucose 117 mg/dL     Narrative:       Meter: [id number] : [id number] Leong Nadya    CBC & Differential [[id number]] Collected:  02/10/17 0442    Specimen:  Blood Updated:  02/10/17 0739    Narrative:       The following orders were created for panel order CBC & Differential.  Procedure                               Abnormality         Status                     ---------                               -----------         ------                     CBC Auto Differential[[id number]]         Abnormal            Final result                 Please view results for these tests on the individual orders.    CBC Auto Differential [[id number]]  (Abnormal) Collected:  02/10/17 0442    Specimen:  Blood Updated:  02/10/17 0739     WBC 10.02 10*3/mm3      RBC 3.90 10*6/mm3      Hemoglobin 12.2 g/dL      Hematocrit 37.0 %      MCV 94.9 fL      MCH 31.3 (H) pg      MCHC 33.0 g/dL      RDW 12.6 %      RDW-SD 43.3 fl      MPV 9.6 fL      Platelets 305 10*3/mm3      Neutrophil % 85.6 (H) %      Lymphocyte % 8.2 (L) %      Monocyte % 6.1 %      Eosinophil % 0.0 %      Basophil % 0.0 %      Immature Grans % 0.1 %      Neutrophils, Absolute 8.58 (H) 10*3/mm3      Lymphocytes, Absolute 0.82 10*3/mm3      Monocytes, Absolute 0.61 10*3/mm3      Eosinophils, Absolute 0.00 (L) 10*3/mm3      Basophils, Absolute 0.00 10*3/mm3       Immature Grans, Absolute 0.01 10*3/mm3      nRBC 0.0 /100 WBC     POC Glucose Fingerstick [[id number]]  (Normal) Collected:  02/10/17 1113    Specimen:  Blood Updated:  02/10/17 1114     Glucose 103 mg/dL     Narrative:       Meter: [id number] : [id number] Dang Covington            Assessment/Plan     POD # 1 s/p LSG/HHR, hx of lap band and removal in the past.    Doing well.  UGi images and report normal post-sleeve.  Fe given for Fe of 24.  Patient feels well and has met discharge criteria.  Will d/c home, followup in 1 week with PAs.  Discharge instructions given to patient who did not have any family present.  All questions answered.  Rx for lortab, zofran,  and omeprazole.         02/10/17  4:27 PM  Shyann Sotelo MD

## 2017-02-10 NOTE — PLAN OF CARE
Problem: Bariatric Surgery (Open/Laparoscopic) (Adult,Pediatric)  Goal: Signs and Symptoms of Listed Potential Problems Will be Absent or Manageable (Bariatric Surgery)  Outcome: Ongoing (interventions implemented as appropriate)

## 2017-02-13 LAB
CYTO UR: NORMAL
LAB AP CASE REPORT: NORMAL
LAB AP CLINICAL INFORMATION: NORMAL
Lab: NORMAL
PATH REPORT.FINAL DX SPEC: NORMAL
PATH REPORT.GROSS SPEC: NORMAL

## 2017-02-15 ENCOUNTER — OFFICE VISIT (OUTPATIENT)
Dept: BARIATRICS/WEIGHT MGMT | Facility: CLINIC | Age: 52
End: 2017-02-15

## 2017-02-15 VITALS
SYSTOLIC BLOOD PRESSURE: 122 MMHG | OXYGEN SATURATION: 99 % | RESPIRATION RATE: 18 BRPM | WEIGHT: 219 LBS | HEIGHT: 63 IN | TEMPERATURE: 97.8 F | DIASTOLIC BLOOD PRESSURE: 78 MMHG | BODY MASS INDEX: 38.8 KG/M2 | HEART RATE: 92 BPM

## 2017-02-15 DIAGNOSIS — Z98.84 S/P BARIATRIC SURGERY: Primary | ICD-10-CM

## 2017-02-15 PROBLEM — E66.01 MORBID OBESITY WITH BMI OF 40.0-44.9, ADULT (HCC): Status: RESOLVED | Noted: 2017-02-09 | Resolved: 2017-02-15

## 2017-02-15 PROCEDURE — 99024 POSTOP FOLLOW-UP VISIT: CPT | Performed by: PHYSICIAN ASSISTANT

## 2017-02-15 RX ORDER — URSODIOL 300 MG/1
300 CAPSULE ORAL 2 TIMES DAILY
Qty: 60 CAPSULE | Refills: 5 | Status: SHIPPED | OUTPATIENT
Start: 2017-02-15 | End: 2017-03-17

## 2017-02-15 NOTE — PROGRESS NOTES
"[facility] Bariatric Surgery  [address]  [facility] [address] [phone]      Patient Name:  Denia Wooten.  :  1965      Date of Visit: 2/15/2017      Bariatric Surgery Follow up Visit     Denia Wooten is a 51 y.o. female POD#6 s/p LSG/HHR/LINDY by Dr. Burkett performed on 17.     HPI:  c/o abdominal soreness, but o/w doing okay.  On stage 1 diet but says the clear liquid protein shots are nasty, so only getting 20-40g prot/day.  Did not realize she could do protein shakes or powders at this point.  Says she was trying to \"follow the rules.\"  Denies N/V/reflux.  Not taking Omeprazole - did not realize she was sent home w/ instructions to take that RX.  Not yet taking vitamins - says she tried once but it did not sit well on her stomach.  Feels as she progresses her diet she will better tolerate the vitamins.  Holding NSAIDs but c/o knee pain, soreness/stiffness.  Ambulating frequently which she admits does help.      Presurgery weight: 229 lbs.  Today's weight is 219 lb (99.3 kg) pounds, today's  Body mass index is 38.79 kg/(m^2)., and her weight loss since surgery is 10 pounds.       Past Medical History   Diagnosis Date   • Abnormal chest xray      decreased lung volumes   • Allergic rhinitis    • Diabetes mellitus type 2 in obese      borderline, stopped Metformin 2 months ago.     • Dyspepsia      chronic   • Dyspnea on exertion    • Fatigue    • Hypercholesteremia      per prim note, no meds   • Hypertension      BORDERLINE; NOT CURRENTLY ON MEDS SINCE    • Insomnia    • Joint pain    • Nephrolithiasis    • Normocytic anemia      12.4/37.2   • Obesity    • Obstructive sleep apnea      CPAP says noncompliant - marlin her/claustrophobic   • Osteoarthritis of both knees      per prim.  On diclofenac daily, prn Aleve   • Peripheral edema    • Restless leg syndrome      Past Surgical History   Procedure Laterality Date   • Colonoscopy       " unremarkable   • Gastric banding  2007     s/p LAGB APS 2007 GDW, replaced w/ APL 2010 GDW for band slip, followed by AGBR 9/2016 d/t band intolerance.   • Kidney stone surgery  2013   • Gastric banding removal  2016   • Bilateral breast reduction     • Pr lap,esophagogast fundoplasty N/A 2/9/2017     Procedure: HIATAL HERNIA REPAIR LAPAROSCOPIC;  Surgeon: Johnathan Burkett MD;  Location:  [facility];  Service: Bariatric   • Gastric sleeve laparoscopic N/A 2/9/2017     Procedure: GASTRIC SLEEVE LAPAROSCOPIC;  Surgeon: Johnathan Burkett MD;  Location:  [facility];  Service:      Outpatient Prescriptions Marked as Taking for the 2/15/17 encounter (Office Visit) with LAYA Choi   Medication Sig Dispense Refill   • cholecalciferol (VITAMIN D3) 1000 UNITS tablet Take 1,000 Units by mouth Every Night.     • HYDROcodone-acetaminophen (NORCO) 7.5-325 MG per tablet Take 1 tablet by mouth Every 6 (Six) Hours As Needed for moderate pain (4-6). 30 tablet 0   • melatonin 5 MG tablet tablet Take 10 mg by mouth Every Night.     • omeprazole (PRILOSEC) 20 MG capsule Take 1 capsule by mouth Daily. 60 capsule 1   • traZODone (DESYREL) 150 MG tablet Take 75 mg by mouth Every Night.       Allergies   Allergen Reactions   • Neosporin Plus Max St Rash       Social History     Social History   • Marital status:      Spouse name: N/A   • Number of children: N/A   • Years of education: N/A     Occupational History   •  - walking route      Social History Main Topics   • Smoking status: Former Smoker     Packs/day: 1.50     Years: 25.00     Quit date: 2001   • Smokeless tobacco: Never Used   • Alcohol use Yes      Comment: wine on occasion   • Drug use: No   • Sexual activity: Defer      Comment:      Other Topics Concern   • Not on file     Social History Narrative    Lives in [place], [place] w/ .       Visit Vitals   • /78 (BP Location: Left arm, Patient Position: Sitting, Cuff Size: Large  "Adult)   • Pulse 92   • Temp 97.8 °F (36.6 °C) (Temporal Artery )   • Resp 18   • Ht 63\" (160 cm)   • Wt 219 lb (99.3 kg)   • SpO2 99%   • BMI 38.79 kg/m2       Physical Exam   Constitutional: She appears well-developed and well-nourished. She is cooperative.   obese   HENT:   Mouth/Throat: Oropharynx is clear and moist and mucous membranes are normal.   Eyes: Conjunctivae are normal. No scleral icterus.   Cardiovascular: Normal rate.    Pulmonary/Chest: Effort normal.   Abdominal: Soft. Bowel sounds are normal. There is no tenderness.   Incisions healing well   Musculoskeletal: Normal range of motion. She exhibits no edema.   Neurological: She is alert.   Skin: Skin is warm and dry. No rash noted.   Psychiatric: She has a normal mood and affect. Judgment normal.         Assessment:  POD#6 s/p LSG/HHR/LINDY by Dr. Burkett performed on 2/9/17.    Plan:  Ok to start stage 2 diet and use protein shakes/powders.  Discussed the importance of adequate protein and risks associated w/ low protein intake.  Continue to advance diet o/w per manual.  Increase exercise/activity as tolerated.  Use heating pad for abdominal soreness.  Reviewed lifting restrictions, nothing >25 lbs x 2 more weeks.   OK to RTW 3/2/17 unless other issues develop.  Start vitamins when able.  Start Omeprazole as prescribed.  RX for Actigall sent w/ instructions to begin taking tomorrow BID. Continue to avoid ASA/NSAIDs/Steroids x 6 weeks postop.  Call w/ any other questions/concerns.  RTC sooner w/ problems.     The patient was instructed to follow up in 3 weeks.   "

## 2017-03-08 ENCOUNTER — OFFICE VISIT (OUTPATIENT)
Dept: BARIATRICS/WEIGHT MGMT | Facility: CLINIC | Age: 52
End: 2017-03-08

## 2017-03-08 VITALS
OXYGEN SATURATION: 98 % | HEIGHT: 63 IN | TEMPERATURE: 98.1 F | HEART RATE: 80 BPM | BODY MASS INDEX: 37.75 KG/M2 | WEIGHT: 213.06 LBS | DIASTOLIC BLOOD PRESSURE: 86 MMHG | RESPIRATION RATE: 18 BRPM | SYSTOLIC BLOOD PRESSURE: 120 MMHG

## 2017-03-08 DIAGNOSIS — R53.83 FATIGUE, UNSPECIFIED TYPE: ICD-10-CM

## 2017-03-08 DIAGNOSIS — Z98.84 S/P BARIATRIC SURGERY: ICD-10-CM

## 2017-03-08 DIAGNOSIS — E66.9 OBESITY, CLASS II, BMI 35-39.9: ICD-10-CM

## 2017-03-08 DIAGNOSIS — I10 ESSENTIAL HYPERTENSION: ICD-10-CM

## 2017-03-08 DIAGNOSIS — E66.9 DIABETES MELLITUS TYPE 2 IN OBESE (HCC): ICD-10-CM

## 2017-03-08 DIAGNOSIS — R60.9 EDEMA, UNSPECIFIED TYPE: ICD-10-CM

## 2017-03-08 DIAGNOSIS — E11.69 DIABETES MELLITUS TYPE 2 IN OBESE (HCC): ICD-10-CM

## 2017-03-08 DIAGNOSIS — R11.0 NAUSEA: Primary | ICD-10-CM

## 2017-03-08 PROCEDURE — 99024 POSTOP FOLLOW-UP VISIT: CPT | Performed by: PHYSICIAN ASSISTANT

## 2017-03-08 NOTE — PROGRESS NOTES
"[facility] Bariatric Surgery  [address]  [facility] [id number] [phone]      Patient Name:  Denia Wooten.  :  1965      Date of Visit: 3/8/2017      Bariatric Surgery Follow up Visit     Denia Wooten is a 51 y.o. female POD#27 s/p LSG/HHR/LINDY by Dr. Burktet on 17.     HPI:  c/o persistent nausea - but has not called.  Says \"I feel like I am pregnant.\"  Wakes w/ nausea that lasts most of the day and finally starts to resolve around dinner.  Eating crackers throughout the day which \"helps some.\"  Taking Omeprazole 40mg each AM.  Denies reflux/indigestion.  Taking Actigall as prescribed.  Has RX for Zofran but did not realize it was for nausea, so has not tried it.  Tolerating diet progression o/w.  Premiere protein shakes \"run right through me\" so eating eggs for breakfast, tuna for lunch, yogurt in the afternoons, and chicken for dinner.  Not tracking her protein intake, but suspects she is only getting 50g prot/day.  Taking vitamins w/out issue.   Still holding NSAIDs but continues to c/o knee pain.  Tried to exercise on the elliptical but it exacerbated her knee pain.  Saw her PCP yesterday and had xrays done.  Planning to start physical therapy next week.     Presurgery weight: 229 lbs.  Today's weight is 213 lb 1 oz (96.6 kg) pounds, today's  Body mass index is 37.74 kg/(m^2)., and her weight loss since surgery is 16 pounds.       Past Medical History   Diagnosis Date   • Abnormal chest xray      decreased lung volumes   • Allergic rhinitis    • Diabetes mellitus type 2 in obese      borderline, stopped Metformin 2 months ago.     • Dyspepsia      chronic   • Dyspnea on exertion    • Fatigue    • Hypercholesteremia      per prim note, no meds   • Hypertension      BORDERLINE; NOT CURRENTLY ON MEDS SINCE    • Insomnia    • Joint pain    • Nephrolithiasis    • Normocytic anemia      12.4/37.2   • Obesity    • Obstructive sleep apnea      CPAP says " noncompliant - marlin her/claustrophobic   • Osteoarthritis of both knees      per prim.  On diclofenac daily, prn Aleve   • Peripheral edema    • Restless leg syndrome      Past Surgical History   Procedure Laterality Date   • Colonoscopy  2014     unremarkable   • Gastric banding  2007     s/p LAGB APS 2007 GDW, replaced w/ APL 2010 GDW for band slip, followed by AGBR 9/2016 d/t band intolerance.   • Kidney stone surgery  2013   • Bilateral breast reduction     • Pr lap,esophagogast fundoplasty N/A 2/9/2017     Procedure: HIATAL HERNIA REPAIR LAPAROSCOPIC;  Surgeon: Johnathan Burkett MD;  Location:  [facility];  Service: Bariatric   • Gastric sleeve laparoscopic N/A 2/9/2017     Procedure: GASTRIC SLEEVE LAPAROSCOPIC;  Surgeon: Johnathan Burkett MD;  Location:  [facility];  Service:      Outpatient Prescriptions Marked as Taking for the 3/8/17 encounter (Office Visit) with LAYA Choi   Medication Sig Dispense Refill   • cholecalciferol (VITAMIN D3) 1000 UNITS tablet Take 1,000 Units by mouth Every Night.     • melatonin 5 MG tablet tablet Take 10 mg by mouth Every Night.     • Multiple Vitamins-Minerals (CENTRUM ADULTS PO) Take 1 tablet by mouth Daily.     • omeprazole (PRILOSEC) 20 MG capsule Take 1 capsule by mouth Daily. 60 capsule 1   • traZODone (DESYREL) 150 MG tablet Take 75 mg by mouth Every Night.     • ursodiol (ACTIGALL) 300 MG capsule Take 1 capsule by mouth 2 (Two) Times a Day for 30 days. 60 capsule 5     Allergies   Allergen Reactions   • Neosporin Plus Max St Rash       Social History     Social History   • Marital status:      Spouse name: N/A   • Number of children: N/A   • Years of education: N/A     Occupational History   •  - walking route      Social History Main Topics   • Smoking status: Former Smoker     Packs/day: 1.50     Years: 25.00     Quit date: 2001   • Smokeless tobacco: Never Used   • Alcohol use Yes      Comment: wine on occasion   • Drug use: No  "  • Sexual activity: Defer      Comment:      Other Topics Concern   • Not on file     Social History Narrative    Lives in [place], [place] w/ .       Visit Vitals   • /86   • Pulse 80   • Temp 98.1 °F (36.7 °C) (Temporal Artery )   • Resp 18   • Ht 63\" (160 cm)   • Wt 213 lb 1 oz (96.6 kg)   • SpO2 98%   • BMI 37.74 kg/m2       Physical Exam   Constitutional: She appears well-developed and well-nourished. She is cooperative.   obese   HENT:   Mouth/Throat: Oropharynx is clear and moist and mucous membranes are normal.   Eyes: Conjunctivae are normal. No scleral icterus.   Cardiovascular: Normal rate.    Pulmonary/Chest: Effort normal.   Abdominal: Soft. Bowel sounds are normal. There is no tenderness.   Incisions healing well   Musculoskeletal: Normal range of motion. She exhibits no edema.   Neurological: She is alert.   Skin: Skin is warm and dry. No rash noted.   Psychiatric: She has a normal mood and affect. Judgment normal.         Assessment:  POD #27 s/p LSG/HHR/LINDY by Dr. Burkett performed on 2/9/17.    Plan:  Again discussed the importance of adequate protein intake.  Continental w/ different protein supplements and increase to 100g prot/day!  Start taking Zofran each AM and then as needed throughout the day.  Call in just a few days if sx persist.  Full bariatric panel ordered including pancreatic enzymes.  Continue vitamins w/ adjustments pending lab results.   Continue to avoid ASA/NSAIDs/Steroids x 6 weeks postop.  Call w/ any other questions/concerns.  RTC sooner w/ problems.     The patient was instructed to follow up in 2 months.  "

## 2017-03-11 LAB
25(OH)D3+25(OH)D2 SERPL-MCNC: 34.9 NG/ML
A-TOCOPHEROL VIT E SERPL-MCNC: 8.8 MG/L (ref 5.3–16.8)
ALBUMIN SERPL-MCNC: 4.3 G/DL (ref 3.2–4.8)
ALBUMIN/GLOB SERPL: 1.7 G/DL (ref 1.5–2.5)
ALP SERPL-CCNC: 107 U/L (ref 25–100)
ALT SERPL-CCNC: 30 U/L (ref 7–40)
AMYLASE SERPL-CCNC: 26 U/L (ref 30–118)
AST SERPL-CCNC: 29 U/L (ref 0–33)
BASOPHILS # BLD AUTO: 0.01 10*3/MM3 (ref 0–0.2)
BASOPHILS NFR BLD AUTO: 0.2 % (ref 0–1)
BILIRUB SERPL-MCNC: 0.4 MG/DL (ref 0.3–1.2)
BUN SERPL-MCNC: 11 MG/DL (ref 9–23)
BUN/CREAT SERPL: 18.3 (ref 7–25)
CALCIUM SERPL-MCNC: 9.8 MG/DL (ref 8.7–10.4)
CHLORIDE SERPL-SCNC: 106 MMOL/L (ref 99–109)
CO2 SERPL-SCNC: 27 MMOL/L (ref 20–31)
CREAT SERPL-MCNC: 0.6 MG/DL (ref 0.6–1.3)
EOSINOPHIL # BLD AUTO: 0.33 10*3/MM3 (ref 0.1–0.3)
EOSINOPHIL NFR BLD AUTO: 6.4 % (ref 0–3)
ERYTHROCYTE [DISTWIDTH] IN BLOOD BY AUTOMATED COUNT: 13.4 % (ref 11.3–14.5)
FERRITIN SERPL-MCNC: 161 NG/ML (ref 10–291)
FOLATE SERPL-MCNC: >24 NG/ML (ref 3.2–20)
GLOBULIN SER CALC-MCNC: 2.5 GM/DL
GLUCOSE SERPL-MCNC: 124 MG/DL (ref 70–100)
HCT VFR BLD AUTO: 39.9 % (ref 34.5–44)
HGB BLD-MCNC: 12.5 G/DL (ref 11.5–15.5)
IMM GRANULOCYTES # BLD: 0 10*3/MM3 (ref 0–0.03)
IMM GRANULOCYTES NFR BLD: 0 % (ref 0–0.6)
IRON SERPL-MCNC: 75 MCG/DL (ref 50–175)
LIPASE SERPL-CCNC: 40 U/L (ref 6–51)
LYMPHOCYTES # BLD AUTO: 1.57 10*3/MM3 (ref 0.6–4.8)
LYMPHOCYTES NFR BLD AUTO: 30.5 % (ref 24–44)
MAGNESIUM SERPL-MCNC: 2 MG/DL (ref 1.3–2.7)
MCH RBC QN AUTO: 30.4 PG (ref 27–31)
MCHC RBC AUTO-ENTMCNC: 31.3 G/DL (ref 32–36)
MCV RBC AUTO: 97.1 FL (ref 80–99)
METHYLMALONATE SERPL-SCNC: 144 NMOL/L (ref 0–378)
MONOCYTES # BLD AUTO: 0.33 10*3/MM3 (ref 0–1)
MONOCYTES NFR BLD AUTO: 6.4 % (ref 0–12)
NEUTROPHILS # BLD AUTO: 2.9 10*3/MM3 (ref 1.5–8.3)
NEUTROPHILS NFR BLD AUTO: 56.5 % (ref 41–71)
PHOSPHATE SERPL-MCNC: 2.9 MG/DL (ref 2.4–5.1)
PLATELET # BLD AUTO: 283 10*3/MM3 (ref 150–450)
POTASSIUM SERPL-SCNC: 3.7 MMOL/L (ref 3.5–5.5)
PREALB SERPL-MCNC: 23 MG/DL (ref 10–36)
PROT SERPL-MCNC: 6.8 G/DL (ref 5.7–8.2)
PTH-INTACT SERPL-MCNC: 23 PG/ML (ref 15–65)
RBC # BLD AUTO: 4.11 10*6/MM3 (ref 3.89–5.14)
SODIUM SERPL-SCNC: 143 MMOL/L (ref 132–146)
VIT A SERPL-MCNC: 49 UG/DL (ref 20–65)
VIT B1 BLD-SCNC: 162.2 NMOL/L (ref 66.5–200)
WBC # BLD AUTO: 5.14 10*3/MM3 (ref 3.5–10.8)
ZINC SERPL-MCNC: 96 UG/DL (ref 56–134)

## 2017-03-13 ENCOUNTER — TELEPHONE (OUTPATIENT)
Dept: BARIATRICS/WEIGHT MGMT | Facility: CLINIC | Age: 52
End: 2017-03-13

## 2017-03-13 NOTE — TELEPHONE ENCOUNTER
Patient called and stated that nausea medication was working and she felt much better. Will call if symptoms do not continue to improve. AG

## 2017-05-10 ENCOUNTER — OFFICE VISIT (OUTPATIENT)
Dept: ORTHOPEDIC SURGERY | Facility: CLINIC | Age: 52
End: 2017-05-10

## 2017-05-10 VITALS — BODY MASS INDEX: 34.72 KG/M2 | WEIGHT: 196 LBS

## 2017-05-10 DIAGNOSIS — M25.571 BILATERAL ANKLE JOINT PAIN: Primary | ICD-10-CM

## 2017-05-10 DIAGNOSIS — M25.572 BILATERAL ANKLE JOINT PAIN: Primary | ICD-10-CM

## 2017-05-10 DIAGNOSIS — M17.12 PRIMARY OSTEOARTHRITIS OF LEFT KNEE: ICD-10-CM

## 2017-05-10 PROCEDURE — 99203 OFFICE O/P NEW LOW 30 MIN: CPT | Performed by: ORTHOPAEDIC SURGERY

## 2017-05-10 PROCEDURE — 73600 X-RAY EXAM OF ANKLE: CPT | Performed by: ORTHOPAEDIC SURGERY

## 2017-05-10 RX ORDER — MELOXICAM 7.5 MG/1
7.5 TABLET ORAL DAILY
Qty: 90 TABLET | Refills: 1 | Status: SHIPPED | OUTPATIENT
Start: 2017-05-10 | End: 2017-09-06

## 2017-05-14 PROBLEM — M25.571 BILATERAL ANKLE JOINT PAIN: Status: ACTIVE | Noted: 2017-05-14

## 2017-05-14 PROBLEM — M25.572 BILATERAL ANKLE JOINT PAIN: Status: ACTIVE | Noted: 2017-05-14

## 2017-05-14 PROBLEM — M17.12 PRIMARY OSTEOARTHRITIS OF LEFT KNEE: Status: ACTIVE | Noted: 2017-05-14

## 2017-05-23 ENCOUNTER — OFFICE VISIT (OUTPATIENT)
Dept: BARIATRICS/WEIGHT MGMT | Facility: CLINIC | Age: 52
End: 2017-05-23

## 2017-05-23 VITALS
BODY MASS INDEX: 34.46 KG/M2 | HEIGHT: 63 IN | DIASTOLIC BLOOD PRESSURE: 84 MMHG | SYSTOLIC BLOOD PRESSURE: 128 MMHG | TEMPERATURE: 97.4 F | WEIGHT: 194.5 LBS | OXYGEN SATURATION: 98 % | HEART RATE: 64 BPM | RESPIRATION RATE: 20 BRPM

## 2017-05-23 DIAGNOSIS — R10.13 DYSPEPSIA: Primary | ICD-10-CM

## 2017-05-23 DIAGNOSIS — E66.9 OBESITY, CLASS I, BMI 30-34.9: ICD-10-CM

## 2017-05-23 DIAGNOSIS — Z98.84 S/P BARIATRIC SURGERY: ICD-10-CM

## 2017-05-23 DIAGNOSIS — E78.5 HYPERLIPIDEMIA, UNSPECIFIED HYPERLIPIDEMIA TYPE: ICD-10-CM

## 2017-05-23 DIAGNOSIS — E11.69 DIABETES MELLITUS TYPE 2 IN OBESE (HCC): ICD-10-CM

## 2017-05-23 DIAGNOSIS — R53.83 FATIGUE, UNSPECIFIED TYPE: ICD-10-CM

## 2017-05-23 DIAGNOSIS — M25.50 ARTHRALGIA, UNSPECIFIED JOINT: ICD-10-CM

## 2017-05-23 DIAGNOSIS — I10 ESSENTIAL HYPERTENSION: ICD-10-CM

## 2017-05-23 DIAGNOSIS — E66.9 DIABETES MELLITUS TYPE 2 IN OBESE (HCC): ICD-10-CM

## 2017-05-23 PROCEDURE — 99214 OFFICE O/P EST MOD 30 MIN: CPT | Performed by: PHYSICIAN ASSISTANT

## 2017-05-23 RX ORDER — URSODIOL 300 MG/1
CAPSULE ORAL
COMMUNITY
Start: 2017-04-13 | End: 2017-09-06

## 2017-05-26 LAB
ALBUMIN SERPL-MCNC: 4.7 G/DL (ref 3.5–5.5)
ALBUMIN/GLOB SERPL: 2 {RATIO} (ref 1.2–2.2)
ALP SERPL-CCNC: 118 IU/L (ref 39–117)
ALT SERPL-CCNC: 28 IU/L (ref 0–32)
AST SERPL-CCNC: 25 IU/L (ref 0–40)
BASOPHILS # BLD AUTO: 0 X10E3/UL (ref 0–0.2)
BASOPHILS NFR BLD AUTO: 0 %
BILIRUB SERPL-MCNC: 0.4 MG/DL (ref 0–1.2)
BUN SERPL-MCNC: 15 MG/DL (ref 6–24)
BUN/CREAT SERPL: 29 (ref 9–23)
CALCIUM SERPL-MCNC: 9.7 MG/DL (ref 8.7–10.2)
CHLORIDE SERPL-SCNC: 101 MMOL/L (ref 96–106)
CO2 SERPL-SCNC: 26 MMOL/L (ref 18–29)
CREAT SERPL-MCNC: 0.52 MG/DL (ref 0.57–1)
EOSINOPHIL # BLD AUTO: 0.2 X10E3/UL (ref 0–0.4)
EOSINOPHIL NFR BLD AUTO: 3 %
ERYTHROCYTE [DISTWIDTH] IN BLOOD BY AUTOMATED COUNT: 13.2 % (ref 12.3–15.4)
FERRITIN SERPL-MCNC: 97 NG/ML (ref 15–150)
FOLATE SERPL-MCNC: >20 NG/ML
GLOBULIN SER CALC-MCNC: 2.3 G/DL (ref 1.5–4.5)
GLUCOSE SERPL-MCNC: 81 MG/DL (ref 65–99)
HCT VFR BLD AUTO: 40.8 % (ref 34–46.6)
HGB BLD-MCNC: 13.3 G/DL (ref 11.1–15.9)
IMM GRANULOCYTES # BLD: 0 X10E3/UL (ref 0–0.1)
IMM GRANULOCYTES NFR BLD: 0 %
IRON SERPL-MCNC: 61 UG/DL (ref 27–159)
LYMPHOCYTES # BLD AUTO: 2.3 X10E3/UL (ref 0.7–3.1)
LYMPHOCYTES NFR BLD AUTO: 41 %
MCH RBC QN AUTO: 30.4 PG (ref 26.6–33)
MCHC RBC AUTO-ENTMCNC: 32.6 G/DL (ref 31.5–35.7)
MCV RBC AUTO: 93 FL (ref 79–97)
METHYLMALONATE SERPL-SCNC: 170 NMOL/L (ref 0–378)
MONOCYTES # BLD AUTO: 0.3 X10E3/UL (ref 0.1–0.9)
MONOCYTES NFR BLD AUTO: 5 %
NEUTROPHILS # BLD AUTO: 2.9 X10E3/UL (ref 1.4–7)
NEUTROPHILS NFR BLD AUTO: 51 %
PLATELET # BLD AUTO: 286 X10E3/UL (ref 150–379)
POTASSIUM SERPL-SCNC: 4.8 MMOL/L (ref 3.5–5.2)
PROT SERPL-MCNC: 7 G/DL (ref 6–8.5)
RBC # BLD AUTO: 4.38 X10E6/UL (ref 3.77–5.28)
SODIUM SERPL-SCNC: 144 MMOL/L (ref 134–144)
VIT B1 BLD-SCNC: 179.3 NMOL/L (ref 66.5–200)
WBC # BLD AUTO: 5.7 X10E3/UL (ref 3.4–10.8)

## 2017-06-20 RX ORDER — OMEPRAZOLE 20 MG/1
CAPSULE, DELAYED RELEASE ORAL
Qty: 30 CAPSULE | Refills: 0 | Status: SHIPPED | OUTPATIENT
Start: 2017-06-20 | End: 2017-07-17 | Stop reason: SDUPTHER

## 2017-07-17 RX ORDER — OMEPRAZOLE 20 MG/1
CAPSULE, DELAYED RELEASE ORAL
Qty: 90 CAPSULE | Refills: 0 | Status: SHIPPED | OUTPATIENT
Start: 2017-07-17 | End: 2017-09-06

## 2017-08-14 RX ORDER — URSODIOL 300 MG/1
CAPSULE ORAL
Qty: 60 CAPSULE | Refills: 4 | OUTPATIENT
Start: 2017-08-14

## 2017-09-06 ENCOUNTER — OFFICE VISIT (OUTPATIENT)
Dept: BARIATRICS/WEIGHT MGMT | Facility: CLINIC | Age: 52
End: 2017-09-06

## 2017-09-06 VITALS
TEMPERATURE: 97.9 F | OXYGEN SATURATION: 99 % | WEIGHT: 189 LBS | BODY MASS INDEX: 33.49 KG/M2 | HEIGHT: 63 IN | SYSTOLIC BLOOD PRESSURE: 122 MMHG | DIASTOLIC BLOOD PRESSURE: 74 MMHG | RESPIRATION RATE: 18 BRPM | HEART RATE: 82 BPM

## 2017-09-06 DIAGNOSIS — I10 ESSENTIAL HYPERTENSION: ICD-10-CM

## 2017-09-06 DIAGNOSIS — Z13.0 SCREENING, ANEMIA, DEFICIENCY, IRON: ICD-10-CM

## 2017-09-06 DIAGNOSIS — K91.2 POSTGASTRECTOMY MALABSORPTION: ICD-10-CM

## 2017-09-06 DIAGNOSIS — E55.9 HYPOVITAMINOSIS D: ICD-10-CM

## 2017-09-06 DIAGNOSIS — E78.5 HYPERLIPIDEMIA, UNSPECIFIED HYPERLIPIDEMIA TYPE: ICD-10-CM

## 2017-09-06 DIAGNOSIS — Z98.84 S/P BARIATRIC SURGERY: ICD-10-CM

## 2017-09-06 DIAGNOSIS — R53.83 FATIGUE, UNSPECIFIED TYPE: Primary | ICD-10-CM

## 2017-09-06 DIAGNOSIS — Z90.3 POSTGASTRECTOMY MALABSORPTION: ICD-10-CM

## 2017-09-06 DIAGNOSIS — M25.50 ARTHRALGIA, UNSPECIFIED JOINT: ICD-10-CM

## 2017-09-06 DIAGNOSIS — E11.69 DIABETES MELLITUS TYPE 2 IN OBESE (HCC): ICD-10-CM

## 2017-09-06 DIAGNOSIS — E66.9 DIABETES MELLITUS TYPE 2 IN OBESE (HCC): ICD-10-CM

## 2017-09-06 DIAGNOSIS — Z13.21 MALNUTRITION SCREEN: ICD-10-CM

## 2017-09-06 PROCEDURE — 99214 OFFICE O/P EST MOD 30 MIN: CPT | Performed by: SURGERY

## 2017-09-06 NOTE — PROGRESS NOTES
[facility] Bariatric Surgery  [address]  [facility] [id number] [phone]        Patient Name:  Denia Wooten.  :  1965      Date of Visit: 2017      Reason for Visit:   6 months postop      HPI: Denia Wooten is a 51 y.o. female s/p LSG/HHR/LINDY by Dr. Burkett on 17    Doing well.  No issues/concerns. Denies dysphagia, reflux, nausea, vomiting and abdominal pain.  Getting 40-80g prot/day.  Does not use protein supplements much as she dislikes them.  Eats protein at each meal.  Drinking 40 fluid oz/day.  3 month labs revealed no deficiencies. Taking MVI, B12, B1, Calcium, Vit D, iron and Vit C.  She finished all her Actigall. Exercising: not able to exercise at all due to knee pain.  She is going to see her orthopedist this week to discuss possible surgery.  She has been going to physical therapy without much help.  If she tries to go for a walk, she is unable to move for a few days.       Presurgery weight: 229 pounds.  Today's weight is 189 lb (85.7 kg) pounds, today's  Body mass index is 33.48 kg/(m^2)., and her weight loss since surgery is 40 pounds.      Past Medical History:   Diagnosis Date   • Abnormal chest xray     decreased lung volumes   • Allergic rhinitis    • Diabetes mellitus type 2 in obese     borderline, stopped Metformin 2 months ago.     • Dyspepsia    • Dyspnea on exertion    • Fatigue    • Hypercholesteremia     per prim note, no meds   • Hypertension    • Insomnia    • Joint pain    • Nephrolithiasis    • Normocytic anemia     12.4/37.2   • Obesity    • Obstructive sleep apnea     CPAP says noncompliant - marlin her/claustrophobic   • Osteoarthritis of both knees     per prim.  On diclofenac daily, prn Aleve   • Peripheral edema    • Restless leg syndrome      Past Surgical History:   Procedure Laterality Date   • BILATERAL BREAST REDUCTION     • COLONOSCOPY      unremarkable   • GASTRIC BANDING      s/p LAGB APS   "GDW, replaced w/ APL 2010 GDW for band slip, followed by AGBR 9/2016 d/t band intolerance.   • GASTRIC SLEEVE LAPAROSCOPIC N/A 2/9/2017    Procedure: GASTRIC SLEEVE LAPAROSCOPIC;  Surgeon: Johnathan Burkett MD;  Location:  [facility];  Service:    • KIDNEY STONE SURGERY  2013   • NE LAP,ESOPHAGOGAST FUNDOPLASTY N/A 2/9/2017    Procedure: HIATAL HERNIA REPAIR LAPAROSCOPIC;  Surgeon: Johnathan Burkett MD;  Location:  [facility];  Service: Bariatric     Outpatient Prescriptions Marked as Taking for the 9/6/17 encounter (Office Visit) with Shyann Sotelo MD   Medication Sig Dispense Refill   • cholecalciferol (VITAMIN D3) 1000 UNITS tablet Take 1,000 Units by mouth Every Night.     • melatonin 5 MG tablet tablet Take 10 mg by mouth Every Night.     • Multiple Vitamins-Minerals (CENTRUM ADULTS PO) Take 1 tablet by mouth Daily.     • traZODone (DESYREL) 150 MG tablet Take 75 mg by mouth Every Night.     • [DISCONTINUED] meloxicam (MOBIC) 7.5 MG tablet Take 1 tablet by mouth Daily. 90 tablet 1       Allergies   Allergen Reactions   • Neosporin Plus Max St Rash       Social History     Social History   • Marital status:      Spouse name: N/A   • Number of children: N/A   • Years of education: N/A     Occupational History   •  - walking route      Social History Main Topics   • Smoking status: Former Smoker     Packs/day: 1.50     Years: 25.00     Quit date: 2001   • Smokeless tobacco: Never Used   • Alcohol use Yes      Comment: wine on occasion   • Drug use: No   • Sexual activity: Defer      Comment:      Other Topics Concern   • Not on file     Social History Narrative    Lives in [place], [place] w/ .       /74 (BP Location: Left arm, Patient Position: Sitting, Cuff Size: Large Adult)  Pulse 82  Temp 97.9 °F (36.6 °C) (Temporal Artery )   Resp 18  Ht 63\" (160 cm)  Wt 189 lb (85.7 kg)  SpO2 99%  BMI 33.48 kg/m2    Physical Exam   Constitutional: She is oriented to " person, place, and time. She appears well-developed and well-nourished. No distress.   HENT:   Head: Normocephalic and atraumatic.   Eyes: EOM are normal. Pupils are equal, round, and reactive to light. No scleral icterus.   Pulmonary/Chest: Effort normal. No respiratory distress.   Abdominal: Soft. She exhibits no distension and no mass. There is no tenderness.   Incisions well-healed   Neurological: She is alert and oriented to person, place, and time. No cranial nerve deficit.   Psychiatric: She has a normal mood and affect. Her behavior is normal. Judgment and thought content normal.         Assessment:  6 months s/p LSG/HHR/LINDY by Dr. Burkett on 2/9/17    ICD-10-CM ICD-9-CM   1. Fatigue, unspecified type R53.83 780.79   2. Hyperlipidemia, unspecified hyperlipidemia type E78.5 272.4   3. Essential hypertension I10 401.9   4. Diabetes mellitus type 2 in obese E11.9 250.00    E66.9 278.00   5. Arthralgia, unspecified joint M25.50 719.40   6. S/P bariatric surgery Z98.84 V45.86   7. Screening, anemia, deficiency, iron Z13.0 V78.0   8. Hypovitaminosis D E55.9 268.9   9. Malnutrition screen Z13.21 V77.2   10. Postgastrectomy malabsorption K91.2 579.3    Z90.3          Plan:  Doing well. Continue w/ good food choices and healthy habits.  Work on protein >70g/day.  Start routine exercise: try recumbent bike.  Routine bariatric labs ordered.  Continue vitamins w/ adjustments pending lab results.  Call w/ problems/concerns.     The patient was instructed to follow up in 6 months, sooner if needed.    note: approx 15 of the 25 minute visit was spent counseling on nutrition and necessary dietary/lifestyle modifications.    Shyann Sotelo MD

## 2017-09-07 ENCOUNTER — OFFICE VISIT (OUTPATIENT)
Dept: ORTHOPEDIC SURGERY | Facility: CLINIC | Age: 52
End: 2017-09-07

## 2017-09-07 DIAGNOSIS — M25.562 LEFT KNEE PAIN, UNSPECIFIED CHRONICITY: Primary | ICD-10-CM

## 2017-09-07 DIAGNOSIS — M17.12 PRIMARY OSTEOARTHRITIS OF LEFT KNEE: ICD-10-CM

## 2017-09-07 PROCEDURE — 73560 X-RAY EXAM OF KNEE 1 OR 2: CPT | Performed by: ORTHOPAEDIC SURGERY

## 2017-09-07 PROCEDURE — 99214 OFFICE O/P EST MOD 30 MIN: CPT | Performed by: ORTHOPAEDIC SURGERY

## 2017-09-07 NOTE — PROGRESS NOTES
Chief Complaint   Patient presents with   • Left Knee - Follow-up           HPI the patient is here today for left knee follow up. Patient is having increasing pain and discomfort of the left knee.  She is limping just about all the time.  She works at the [employer] and by the end of the day her knee pain is quite intolerable.  She has been using anti-inflammatory medication which is somewhat helpful in managing her symptoms.  She is working hard to lose weight and recently has lost 51 pounds.  This loss of weight and seems to have improved her symptoms and decrease her pain and discomfort.  She has tried intra-articular injections but does not want to repeat them because the effects were very minimal in the improvement of function.  She now wants to proceed with a total knee arthroplasty because her quality of life is very negative and she wants to exercise to continue to lose weight and maintain a healthy lifestyle.  Extensive discussion about the risks and benefits of knee arthroplasty carried out with the patient in the office today.        There were no vitals filed for this visit.        Review of Systems   Constitutional: Negative.    HENT: Negative.    Eyes: Negative.    Respiratory: Negative.    Cardiovascular: Negative.    Gastrointestinal: Negative.    Endocrine: Negative.    Genitourinary: Negative.    Musculoskeletal: Negative.    Skin: Negative.    Allergic/Immunologic: Negative.    Neurological: Negative.    Hematological: Negative.    Psychiatric/Behavioral: Negative.            Physical Exam   Constitutional: She is oriented to person, place, and time. She appears well-nourished.   HENT:   Head: Atraumatic.   Eyes: EOM are normal.   Neck: Neck supple.   Cardiovascular: Normal rate and intact distal pulses.    Pulmonary/Chest: Effort normal and breath sounds normal.   Abdominal: Soft. Bowel sounds are normal.   Musculoskeletal: She exhibits edema, tenderness and deformity.   Neurological: She  is alert and oriented to person, place, and time. She has normal reflexes.   Skin: Skin is dry.   Psychiatric: She has a normal mood and affect. Her behavior is normal. Judgment and thought content normal.   Nursing note and vitals reviewed.          Joint/Body Part Specific Exam:  left knee. Patient has crepitus throughout range of motion. Positive patellar grind test. Mild effusion. Lachman is negative. Pivot shift is negative. Anterior and posterior drawer signs are negative. Significant joint line tenderness is noted on the medial aspect of the knee. Patient has a varus orientation of the knee. Range of motion in flexion is for 10- 90° degrees. Neurovascular status intact.  Dorsalis pedis and posterior tibial artery pulses are palpable. Common peroneal nerve function is well preserved. Patients gait is cautious and antalgic. Skin and soft tissues are swollen; consistent with synovitis and effusion.  She has very significant limp on the left side.  Her varus deformity is quite obvious to see.  She has a lot of pain and discomfort when she first gets out of a chair from a seated position and commences to walk.  There is pain and tenderness posteriorly over the popliteal fossa most likely at the site of a Baker cyst.      X-RAY Report:  left Knee X-Ray  Indication: Evaluation of pain on the medial aspect of the knee and the progression of arthritis  AP, Lateral views  Findings: Advanced osteoarthritis of the knee with complete loss of medial compartmental joint space  no bony lesion  Soft tissues within normal limits  decreased joint spaces  Hardware appropriately positioned not applicable      yes prior studies available for comparison.    X-RAY was ordered and reviewed by Justin Joy MD      Diagnostics:        Denia was seen today for follow-up.    Diagnoses and all orders for this visit:    Left knee pain, unspecified chronicity  -     XR Knee 1 or 2 View Left    Primary osteoarthritis of left  knee          Procedures        Plan:  Home based exercise program with stretching and strengthening of the quads and hamstrings.    Calcium and vitamin D for bone health.    Use a supportive brace to the knee to prevent it from buckling and giving out.    The patient states that the intra-articular injections help her only on a very limited basis and she is not interested in more injections to the knee.    Tablet ibuprofen 600 mg orally twice a day p.m. pain and discomfort.    Patient wants to proceed with a total knee arthroplasty to improve her quality of life because of the pain and discomfort that she experiences on a daily basis.  She is unable to perform her job at the [employer] because of the knee symptoms.    Extensive discussion with the patient in the office today about scheduling the surgical intervention for her in the near future.    Time spent in the office with the patient making the surgical decision is 30 minutes.    The patient was seen in the office today for preoperative discussion.  The patient has been tried on over-the-counter and prescription NSAID's despite the risks of anti-inflammatory bleeding, peptic ulcers and erosive gastritis with short term benefit only.  Braces have been prescribed for mechanical support.  Patient has been participating in an exercise program specifically targeting joint pain relief with limited benefit. Intraarticular injections have been used periodically with some but not complete relief of pain.  Ambulation aids have also been utilized.      The details of the surgical procedure were explained including the location of probable incisions and a description of the likely hardware/grafts to be used. The patient understands the likely convalescence after surgery as well as the rehabilitation required.  Also, we have thoroughly discussed with the patient the risks, benefits and alternatives to surgery.  Risks include but are not limited to the risk of  infection, joint stiffness, limited range of motion, wound healing problems, scar tissue build up, myocardial infarction, stroke, blood clots (including DVT and/or pulmonary embolus along with the risk of death) neurologic and/or vascular injury, limb length discrepancy, fracture, dislocation, nonunion, malunion, continued pain and need for further surgery including hardware failure requiring revision.

## 2017-09-09 LAB
ALBUMIN SERPL-MCNC: 4.9 G/DL (ref 3.5–5.5)
ALBUMIN/GLOB SERPL: 2.2 {RATIO} (ref 1.2–2.2)
ALP SERPL-CCNC: 119 IU/L (ref 39–117)
ALT SERPL-CCNC: 27 IU/L (ref 0–32)
AST SERPL-CCNC: 22 IU/L (ref 0–40)
BASOPHILS # BLD AUTO: 0 X10E3/UL (ref 0–0.2)
BASOPHILS NFR BLD AUTO: 0 %
BILIRUB SERPL-MCNC: 0.4 MG/DL (ref 0–1.2)
BUN SERPL-MCNC: 18 MG/DL (ref 6–24)
BUN/CREAT SERPL: 29 (ref 9–23)
CALCIUM SERPL-MCNC: 9.4 MG/DL (ref 8.7–10.2)
CHLORIDE SERPL-SCNC: 102 MMOL/L (ref 96–106)
CO2 SERPL-SCNC: 27 MMOL/L (ref 18–29)
CREAT SERPL-MCNC: 0.62 MG/DL (ref 0.57–1)
EOSINOPHIL # BLD AUTO: 0.1 X10E3/UL (ref 0–0.4)
EOSINOPHIL NFR BLD AUTO: 2 %
ERYTHROCYTE [DISTWIDTH] IN BLOOD BY AUTOMATED COUNT: 12.7 % (ref 12.3–15.4)
FERRITIN SERPL-MCNC: 125 NG/ML (ref 15–150)
FOLATE SERPL-MCNC: 12.9 NG/ML
GLOBULIN SER CALC-MCNC: 2.2 G/DL (ref 1.5–4.5)
GLUCOSE SERPL-MCNC: 91 MG/DL (ref 65–99)
HCT VFR BLD AUTO: 39.2 % (ref 34–46.6)
HGB BLD-MCNC: 13.3 G/DL (ref 11.1–15.9)
IMM GRANULOCYTES # BLD: 0 X10E3/UL (ref 0–0.1)
IMM GRANULOCYTES NFR BLD: 0 %
IRON SERPL-MCNC: 85 UG/DL (ref 27–159)
LYMPHOCYTES # BLD AUTO: 2.5 X10E3/UL (ref 0.7–3.1)
LYMPHOCYTES NFR BLD AUTO: 40 %
MCH RBC QN AUTO: 31.3 PG (ref 26.6–33)
MCHC RBC AUTO-ENTMCNC: 33.9 G/DL (ref 31.5–35.7)
MCV RBC AUTO: 92 FL (ref 79–97)
METHYLMALONATE SERPL-SCNC: 234 NMOL/L (ref 0–378)
MONOCYTES # BLD AUTO: 0.4 X10E3/UL (ref 0.1–0.9)
MONOCYTES NFR BLD AUTO: 6 %
NEUTROPHILS # BLD AUTO: 3.3 X10E3/UL (ref 1.4–7)
NEUTROPHILS NFR BLD AUTO: 52 %
PLATELET # BLD AUTO: 285 X10E3/UL (ref 150–379)
POTASSIUM SERPL-SCNC: 5 MMOL/L (ref 3.5–5.2)
PREALB SERPL-MCNC: 27 MG/DL (ref 10–36)
PROT SERPL-MCNC: 7.1 G/DL (ref 6–8.5)
RBC # BLD AUTO: 4.25 X10E6/UL (ref 3.77–5.28)
SODIUM SERPL-SCNC: 144 MMOL/L (ref 134–144)
VIT B1 BLD-SCNC: 131.4 NMOL/L (ref 66.5–200)
WBC # BLD AUTO: 6.4 X10E3/UL (ref 3.4–10.8)

## 2017-09-13 PROBLEM — M25.562 LEFT KNEE PAIN: Status: ACTIVE | Noted: 2017-09-13

## 2017-10-09 ENCOUNTER — CONVERSION ENCOUNTER (OUTPATIENT)
Dept: OTHER | Facility: HOSPITAL | Age: 52
End: 2017-10-09

## 2017-10-16 ENCOUNTER — OUTSIDE FACILITY SERVICE (OUTPATIENT)
Dept: ORTHOPEDIC SURGERY | Facility: CLINIC | Age: 52
End: 2017-10-16

## 2017-10-16 PROCEDURE — 27447 TOTAL KNEE ARTHROPLASTY: CPT | Performed by: ORTHOPAEDIC SURGERY

## 2017-10-16 PROCEDURE — 20985 CPTR-ASST DIR MS PX: CPT | Performed by: ORTHOPAEDIC SURGERY

## 2017-10-23 ENCOUNTER — TELEPHONE (OUTPATIENT)
Dept: ORTHOPEDIC SURGERY | Facility: CLINIC | Age: 52
End: 2017-10-23

## 2017-10-23 NOTE — TELEPHONE ENCOUNTER
That is the correct advise.  I have already checked her report from the hospital and it was negative for duplex Doppler proof of a DVT.  The patient had already left the ER by the time I got there from surgery.  She can continue to wrap the leg elevated for swelling and take aspirin 325 mg tab 1 by mouth daily for DVT prophylaxis.  She can also continue her physical therapy as her the post-total knee arthroplasty protocol.

## 2017-10-31 ENCOUNTER — OFFICE VISIT (OUTPATIENT)
Dept: ORTHOPEDIC SURGERY | Facility: CLINIC | Age: 52
End: 2017-10-31

## 2017-10-31 DIAGNOSIS — Z96.652 S/P TOTAL KNEE REPLACEMENT, LEFT: Primary | ICD-10-CM

## 2017-10-31 PROCEDURE — 99024 POSTOP FOLLOW-UP VISIT: CPT | Performed by: ORTHOPAEDIC SURGERY

## 2017-10-31 RX ORDER — OXYCODONE HYDROCHLORIDE AND ACETAMINOPHEN 5; 325 MG/1; MG/1
1 TABLET ORAL EVERY 12 HOURS PRN
Qty: 40 TABLET | Refills: 0 | Status: SHIPPED | OUTPATIENT
Start: 2017-10-31 | End: 2018-07-03

## 2017-11-03 RX ORDER — MELOXICAM 7.5 MG/1
TABLET ORAL
Qty: 30 TABLET | Refills: 0 | Status: SHIPPED | OUTPATIENT
Start: 2017-11-03 | End: 2017-12-05 | Stop reason: SDUPTHER

## 2017-11-07 PROBLEM — Z96.652 S/P TOTAL KNEE REPLACEMENT, LEFT: Status: ACTIVE | Noted: 2017-11-07

## 2017-11-30 ENCOUNTER — OFFICE VISIT (OUTPATIENT)
Dept: ORTHOPEDIC SURGERY | Facility: CLINIC | Age: 52
End: 2017-11-30

## 2017-11-30 DIAGNOSIS — Z96.652 HISTORY OF TOTAL KNEE ARTHROPLASTY, LEFT: Primary | ICD-10-CM

## 2017-11-30 PROCEDURE — 99024 POSTOP FOLLOW-UP VISIT: CPT | Performed by: ORTHOPAEDIC SURGERY

## 2017-11-30 PROCEDURE — 73560 X-RAY EXAM OF KNEE 1 OR 2: CPT | Performed by: ORTHOPAEDIC SURGERY

## 2017-11-30 RX ORDER — RIVAROXABAN 10 MG/1
TABLET, FILM COATED ORAL
COMMUNITY
Start: 2017-10-18 | End: 2018-03-13

## 2017-11-30 RX ORDER — CYCLOBENZAPRINE HCL 10 MG
TABLET ORAL
COMMUNITY
Start: 2017-10-23 | End: 2018-07-03

## 2017-11-30 RX ORDER — ACYCLOVIR 50 MG/G
OINTMENT TOPICAL
COMMUNITY
Start: 2017-11-15 | End: 2018-07-03

## 2017-12-01 RX ORDER — HYDROCODONE BITARTRATE AND ACETAMINOPHEN 5; 325 MG/1; MG/1
1 TABLET ORAL EVERY 8 HOURS PRN
Qty: 30 TABLET | Refills: 0 | Status: SHIPPED | OUTPATIENT
Start: 2017-12-01 | End: 2020-09-17

## 2017-12-04 ENCOUNTER — OUTSIDE FACILITY SERVICE (OUTPATIENT)
Dept: ORTHOPEDIC SURGERY | Facility: CLINIC | Age: 52
End: 2017-12-04

## 2017-12-04 PROCEDURE — 27570 FIXATION OF KNEE JOINT: CPT | Performed by: ORTHOPAEDIC SURGERY

## 2017-12-05 RX ORDER — MELOXICAM 7.5 MG/1
TABLET ORAL
Qty: 90 TABLET | Refills: 0 | Status: SHIPPED | OUTPATIENT
Start: 2017-12-05 | End: 2018-03-13 | Stop reason: SDUPTHER

## 2017-12-06 PROBLEM — Z96.652 HISTORY OF TOTAL KNEE ARTHROPLASTY, LEFT: Status: ACTIVE | Noted: 2017-12-06

## 2017-12-20 ENCOUNTER — OFFICE VISIT (OUTPATIENT)
Dept: ORTHOPEDIC SURGERY | Facility: CLINIC | Age: 52
End: 2017-12-20

## 2017-12-20 DIAGNOSIS — Z96.652 S/P TOTAL KNEE REPLACEMENT, LEFT: Primary | ICD-10-CM

## 2017-12-20 PROCEDURE — 99024 POSTOP FOLLOW-UP VISIT: CPT | Performed by: ORTHOPAEDIC SURGERY

## 2017-12-20 NOTE — PROGRESS NOTES
"Chief Complaint   Patient presents with   • Left Knee - Follow-up         HPI the patient is here today for a follow up of her left total knee arthroplasty that was done on 10/16/17. She had a manipulation on 12/4/17For arthrofibrosis and limitations of flexion.  She is doing extremely well at this point.  The pain has settled down nicely.  Her range of motion is progressively improved.  She is tolerating her physical therapy quite well at this point.  In fact, the patient has inquired for me about going back to work at some point.  She states \"I am tickled to death with the manipulation\".  Overall, her quality of life is improved significantly after the knee replacement and she is very pleased with the fact that she can exercise for her general health improvement.  She is in an outpatient physical therapy program and is performing the exercises and the elliptical machine twice a day to keep the range of motion and the flexibility intact.        There were no vitals filed for this visit.        Joint/Body Part Specific Exam:  left knee.The patient is status post total knee arthroplasty postoperative 8 week(s). Incision is clean. Calf is soft and nontender. Homans sign is negative. There is no clicking, popping or catching. Anterior and posterior drawer signs are negative.  There is no instability of the components. Appropriate amounts of swelling and bruising are noted. Dorsalis pedis and posterior tibial artery pulses are palpable. Common peroneal nerve function is well preserved. Range of motion is from 0- 110 degrees of flexion. Gait is cautious but otherwise fairly normal. There is no evidence of a deep seated joint infection.      X-RAY REPORT:        Denia was seen today for follow-up.    Diagnoses and all orders for this visit:    S/P total knee replacement, left  -     Elastic Bandages & Supports (JOBST ANTI-EM THIGH HIGH LARGE) misc; Wear stocking daily until d/c by physician " 20-30MMHG          Procedures        Instructions:      Plan:Continue with aggressive physical therapy with stretching and strengthening of the quads and the hamstrings.    Ace wrap from toes to groin to help decrease the swelling and minimize the possibility of a DVT.    Tablet ibuprofen 600 mg orally twice a day for pain and discomfort.    Continue to use the Jobst compression garment stocking to help decrease the swelling.    , GI and dental procedure prophylaxis with antibiotics to prevent a metastatic infection to the knee implants.    Follow-up in my office in 6 weeks for reevaluation

## 2017-12-28 ENCOUNTER — TELEPHONE (OUTPATIENT)
Dept: ORTHOPEDIC SURGERY | Facility: CLINIC | Age: 52
End: 2017-12-28

## 2017-12-28 NOTE — TELEPHONE ENCOUNTER
Patient is requesting to return to work on Saturday November 30th. Per Dr. Joy I will have a letter ready for the patient to  in our office.

## 2018-01-03 ENCOUNTER — DOCUMENTATION (OUTPATIENT)
Dept: ORTHOPEDIC SURGERY | Facility: CLINIC | Age: 53
End: 2018-01-03

## 2018-02-01 ENCOUNTER — OFFICE VISIT (OUTPATIENT)
Dept: ORTHOPEDIC SURGERY | Facility: CLINIC | Age: 53
End: 2018-02-01

## 2018-02-01 DIAGNOSIS — Z96.652 S/P TOTAL KNEE REPLACEMENT, LEFT: Primary | ICD-10-CM

## 2018-02-01 PROCEDURE — 99213 OFFICE O/P EST LOW 20 MIN: CPT | Performed by: ORTHOPAEDIC SURGERY

## 2018-02-01 NOTE — PROGRESS NOTES
Chief Complaint   Patient presents with   • Left Knee - Follow-up     TKA 10/16/17         HPI The patient is here today for a follow up of her left total knee arthroplasty that was done on 10/16/17.  Patient is doing extremely well 3-1/2 months status post total knee arthroplasty.  Her range of motion is improved significantly.  She does not have any significant pain or discomfort.  She describes her sensation as a dull ache which is activity related.  At The end of the day she feels a lot better at this point.  She feels that the arthroplasty surgery has helped her tremendously in terms of improved quality of life and decrease pain and discomfort.  She has gone back to work and is now able to last through an entire shift without limping or having significant pain.  She is very pleased with the improvement in her quality of life and the surgical outcome following knee arthroplasty.        There were no vitals filed for this visit.        Joint/Body Part Specific Exam:  left knee.The patient is status post total knee arthroplasty postoperative 3.5 month(s). Incision is clean. Calf is soft and nontender. Homans sign is negative. There is no clicking, popping or catching. Anterior and posterior drawer signs are negative.  There is no instability of the components. Appropriate amounts of swelling and bruising are noted. Dorsalis pedis and posterior tibial artery pulses are palpable. Common peroneal nerve function is well preserved. Range of motion is from 0- 105° degrees of flexion. Gait is cautious but otherwise fairly normal. There is no evidence of a deep seated joint infection.      X-RAY REPORT:        Denia was seen today for follow-up.    Diagnoses and all orders for this visit:    S/P total knee replacement, left          Procedures        Instructions:      Plan:Incision care.    Stretching and strengthening exercises of the quads and the hamstrings.    Tablet ibuprofen 600 mg orally twice a day for pain and  discomfort.    Continue with aggressive physical therapy to the knee to help achieve her full potential after the knee replacement surgery.    , GI and dental procedure prophylaxis with antibiotics to prevent a metastatic infection to the knee arthroplasty implants.    Ace wrap for local application to help control the swelling and minimize the possibility of DVT.    Follow-up in my office in 3 months for reevaluation.

## 2018-03-13 RX ORDER — MELOXICAM 7.5 MG/1
TABLET ORAL
Qty: 90 TABLET | Refills: 0 | Status: SHIPPED | OUTPATIENT
Start: 2018-03-13 | End: 2018-06-19 | Stop reason: SDUPTHER

## 2018-05-08 ENCOUNTER — OFFICE VISIT CONVERTED (OUTPATIENT)
Dept: FAMILY MEDICINE CLINIC | Age: 53
End: 2018-05-08
Attending: FAMILY MEDICINE

## 2018-05-10 ENCOUNTER — OFFICE VISIT (OUTPATIENT)
Dept: ORTHOPEDIC SURGERY | Facility: CLINIC | Age: 53
End: 2018-05-10

## 2018-05-10 DIAGNOSIS — M25.532 LEFT WRIST PAIN: Primary | ICD-10-CM

## 2018-05-10 DIAGNOSIS — Z96.652 S/P TOTAL KNEE REPLACEMENT, LEFT: ICD-10-CM

## 2018-05-10 PROCEDURE — 73100 X-RAY EXAM OF WRIST: CPT | Performed by: ORTHOPAEDIC SURGERY

## 2018-05-10 PROCEDURE — 99213 OFFICE O/P EST LOW 20 MIN: CPT | Performed by: ORTHOPAEDIC SURGERY

## 2018-05-10 NOTE — PROGRESS NOTES
Chief Complaint   Patient presents with   • Left Wrist - Establish Care   • Left Knee - Follow-up   Patient is here today following up on her left knee and has a new complaint of left wrist pain.        HPI he had a knee replacement on the left side performed by me on 15 October 2018.She is doing very well post surgery. The walking distance has improved significantly. She is not using any narcotics for pain control. The varus deformity has been well corrected and she pleased with her outcome. She would like to walk and exercise to control her weight.  She has been complaining of left wrist pain.  The pain is on the volar aspect of the wrist.  There is an associated volar ganglion cyst.  She has flexor tendinitis.  Pain is worse when she tries to lift heavy objects.  She finds it difficult to make a fist.  Power  is somewhat affected by the pain and discomfort.  She states that the pain has been going on for at least 2-3 months and is a dull ache which never really goes away.  She does not have any numbness or tingling related to the median nerve function in the carpal tunnel region at this point.          Allergies   Allergen Reactions   • Neosporin Plus Max St Rash         Social History     Social History   • Marital status:      Spouse name: N/A   • Number of children: N/A   • Years of education: N/A     Occupational History   •  - walking route      Social History Main Topics   • Smoking status: Former Smoker     Packs/day: 1.50     Years: 25.00     Quit date: 2001   • Smokeless tobacco: Never Used   • Alcohol use Yes      Comment: wine on occasion   • Drug use: No   • Sexual activity: Defer      Comment:      Other Topics Concern   • Not on file     Social History Narrative    Lives in [place], [place] w/ .       Family History   Problem Relation Age of Onset   • Hypertension Mother    • Diabetes Mother    • Hypertension Father    • Sleep apnea Father    • Stroke Father    •  Cancer Maternal Grandmother    • Stomach cancer Maternal Grandmother    • Cancer Maternal Grandfather    • Hypertension Maternal Grandfather    • Diabetes Maternal Grandfather    • Stroke Maternal Grandfather    • Colon cancer Maternal Grandfather    • Heart attack Paternal Grandfather        Past Surgical History:   Procedure Laterality Date   • BILATERAL BREAST REDUCTION     • COLONOSCOPY  2014    unremarkable   • GASTRIC BANDING  2007    s/p LAGB APS 2007 GDW, replaced w/ APL 2010 GDW for band slip, followed by AGBR 9/2016 d/t band intolerance.   • GASTRIC SLEEVE LAPAROSCOPIC N/A 2/9/2017    Procedure: GASTRIC SLEEVE LAPAROSCOPIC;  Surgeon: Johnathan Burkett MD;  Location:  [facility];  Service:    • KIDNEY STONE SURGERY  2013   • [place] LAP,ESOPHAGOGAST FUNDOPLASTY N/A 2/9/2017    Procedure: HIATAL HERNIA REPAIR LAPAROSCOPIC;  Surgeon: Johnathan Burkett MD;  Location:  [facility];  Service: Bariatric       Past Medical History:   Diagnosis Date   • Abnormal chest xray     decreased lung volumes   • Allergic rhinitis    • Diabetes mellitus type 2 in obese     borderline, stopped Metformin 2 months ago.     • Dyspepsia    • Dyspnea on exertion    • Fatigue    • Hypercholesteremia     per prim note, no meds   • Hypertension    • Insomnia    • Joint pain    • Nephrolithiasis    • Normocytic anemia     12.4/37.2   • Obesity    • Obstructive sleep apnea     CPAP says noncompliant - marlin her/claustrophobic   • Osteoarthritis of both knees     per prim.  On diclofenac daily, prn Aleve   • Peripheral edema    • Restless leg syndrome            There were no vitals filed for this visit.          Review of Systems   Constitutional: Negative.    HENT: Negative.    Eyes: Negative.    Respiratory: Negative.    Cardiovascular: Negative.    Gastrointestinal: Negative.    Endocrine: Negative.    Genitourinary: Negative.    Musculoskeletal: Positive for joint swelling.   Skin: Negative.    Allergic/Immunologic: Negative.     Neurological: Negative.    Hematological: Negative.    Psychiatric/Behavioral: Negative.            Physical Exam   Constitutional: She is oriented to person, place, and time. She appears well-nourished.   HENT:   Head: Atraumatic.   Eyes: Conjunctivae and EOM are normal.   Neck: Neck supple.   Cardiovascular: Normal rate and intact distal pulses.    Pulmonary/Chest: Effort normal and breath sounds normal.   Abdominal: Bowel sounds are normal.   Musculoskeletal: She exhibits edema and tenderness.   Neurological: She is alert and oriented to person, place, and time.   Skin: Skin is dry. Capillary refill takes 2 to 3 seconds.   Psychiatric: She has a normal mood and affect. Her behavior is normal. Judgment and thought content normal.   Nursing note and vitals reviewed.              Joint/Body Part Specific Exam:  left knee.The patient is status post total knee arthroplasty postoperative 7 month(s). Incision is clean. Calf is soft and nontender. Homans sign is negative. There is no clicking, popping or catching. Anterior and posterior drawer signs are negative.  There is no instability of the components. Appropriate amounts of swelling and bruising are noted. Dorsalis pedis and posterior tibial artery pulses are palpable. Common peroneal nerve function is well preserved. Range of motion is from 0- 115 degrees of flexion. Gait is cautious but otherwise fairly normal. There is no evidence of a deep seated joint infection.    left wrist. The patient is right  hand dominant. There is a significant amount of swelling and tenderness along the 1st dorsal compartment tendons. Abduction of the thumb bothers the patient significantly. There is pain and tenderness over the radial styloid process. Skin and soft tissues are somewhat swollen and mildly bruised. Finkelstein sign is positive. Ulnar deviation of the wrist bothers the patient significantly. Patients  strength is somewhat compromised because of the pain along the  base of the thumb and over the radial styloid process. Patient is unable to make a fist with good  strength when fist is clenched. Capillary refill is 2 seconds with a brisk return. Radial artery pulses are palpable. Median nerve function is well preserved. There is some amount of inflammation over the dorsal extensor tendon sheaths over the remaining radial carpal compartments dorsally.      X-RAY Report:  left Wrist X-Ray  Indication: pain on the volar aspect of the wrist  AP, Lateral views  Findings: Fairly normal contour of the radiocarpal articulation  no bony lesion  Soft tissues within normal limits  within normal limits joint spaces  Hardware appropriately positioned not applicable      no prior studies available for comparison.    X-RAY was ordered and reviewed by Justin Joy MD          Diagnostics:            Denia was seen today for establish care and follow-up.    Diagnoses and all orders for this visit:    Left wrist pain  -     XR Wrist 2 View Left            Procedures          I provided this patient with educational materials regarding exercise, bone health and cast or splint care.        Plan:   Use a supportive brace on the left wrist to protect it from repetitive loading.    Tablet ibuprofen 600 mg orally twice a day for pain and discomfort.    Topical application of Pennsaid as an anti-inflammatory medication.    , GI and dental procedure prophylaxis with antibiotics to prevent a metastatic infection to the left total knee arthroplasty.    Follow-up in my office in 1 year for joint surveillance on the left knee arthroplasty.    Steroid injection for the FCR tendinitis discussed with the patient.        CC To Mariel Lima MD

## 2018-05-26 PROBLEM — M25.532 LEFT WRIST PAIN: Status: ACTIVE | Noted: 2018-05-26

## 2018-06-11 ENCOUNTER — OFFICE VISIT CONVERTED (OUTPATIENT)
Dept: FAMILY MEDICINE CLINIC | Age: 53
End: 2018-06-11
Attending: NURSE PRACTITIONER

## 2018-06-19 RX ORDER — MELOXICAM 7.5 MG/1
TABLET ORAL
Qty: 90 TABLET | Refills: 0 | Status: SHIPPED | OUTPATIENT
Start: 2018-06-19 | End: 2018-12-17 | Stop reason: SDUPTHER

## 2018-07-03 ENCOUNTER — OFFICE VISIT (OUTPATIENT)
Dept: BARIATRICS/WEIGHT MGMT | Facility: CLINIC | Age: 53
End: 2018-07-03

## 2018-07-03 VITALS
DIASTOLIC BLOOD PRESSURE: 83 MMHG | TEMPERATURE: 97.8 F | WEIGHT: 199.51 LBS | SYSTOLIC BLOOD PRESSURE: 140 MMHG | HEIGHT: 63 IN | BODY MASS INDEX: 35.35 KG/M2 | HEART RATE: 63 BPM | OXYGEN SATURATION: 99 % | RESPIRATION RATE: 18 BRPM

## 2018-07-03 DIAGNOSIS — Z98.84 S/P BARIATRIC SURGERY: ICD-10-CM

## 2018-07-03 DIAGNOSIS — R79.0 ABNORMAL BLOOD LEVEL OF IRON: ICD-10-CM

## 2018-07-03 DIAGNOSIS — E66.9 OBESITY, CLASS II, BMI 35-39.9: ICD-10-CM

## 2018-07-03 DIAGNOSIS — E55.9 VITAMIN D DEFICIENCY: ICD-10-CM

## 2018-07-03 DIAGNOSIS — R53.83 FATIGUE, UNSPECIFIED TYPE: Primary | ICD-10-CM

## 2018-07-03 PROCEDURE — 99214 OFFICE O/P EST MOD 30 MIN: CPT | Performed by: PHYSICIAN ASSISTANT

## 2018-07-07 LAB
25(OH)D3+25(OH)D2 SERPL-MCNC: 30.4 NG/ML (ref 30–100)
ALBUMIN SERPL-MCNC: 4.9 G/DL (ref 3.5–5.5)
ALBUMIN/GLOB SERPL: 1.8 {RATIO} (ref 1.2–2.2)
ALP SERPL-CCNC: 127 IU/L (ref 39–117)
ALT SERPL-CCNC: 24 IU/L (ref 0–32)
AST SERPL-CCNC: 22 IU/L (ref 0–40)
BASOPHILS # BLD AUTO: 0 X10E3/UL (ref 0–0.2)
BASOPHILS NFR BLD AUTO: 0 %
BILIRUB SERPL-MCNC: 0.3 MG/DL (ref 0–1.2)
BUN SERPL-MCNC: 19 MG/DL (ref 6–24)
BUN/CREAT SERPL: 31 (ref 9–23)
CALCIUM SERPL-MCNC: 10 MG/DL (ref 8.7–10.2)
CHLORIDE SERPL-SCNC: 100 MMOL/L (ref 96–106)
CO2 SERPL-SCNC: 28 MMOL/L (ref 20–29)
CREAT SERPL-MCNC: 0.62 MG/DL (ref 0.57–1)
EOSINOPHIL # BLD AUTO: 0.1 X10E3/UL (ref 0–0.4)
EOSINOPHIL NFR BLD AUTO: 2 %
ERYTHROCYTE [DISTWIDTH] IN BLOOD BY AUTOMATED COUNT: 12.9 % (ref 12.3–15.4)
FERRITIN SERPL-MCNC: 86 NG/ML (ref 15–150)
FOLATE SERPL-MCNC: 9 NG/ML
GLOBULIN SER CALC-MCNC: 2.8 G/DL (ref 1.5–4.5)
GLUCOSE SERPL-MCNC: 85 MG/DL (ref 65–99)
HCT VFR BLD AUTO: 41.3 % (ref 34–46.6)
HGB BLD-MCNC: 13.5 G/DL (ref 11.1–15.9)
IMM GRANULOCYTES # BLD: 0 X10E3/UL (ref 0–0.1)
IMM GRANULOCYTES NFR BLD: 0 %
LYMPHOCYTES # BLD AUTO: 2.4 X10E3/UL (ref 0.7–3.1)
LYMPHOCYTES NFR BLD AUTO: 38 %
Lab: NORMAL
MCH RBC QN AUTO: 30.8 PG (ref 26.6–33)
MCHC RBC AUTO-ENTMCNC: 32.7 G/DL (ref 31.5–35.7)
MCV RBC AUTO: 94 FL (ref 79–97)
METHYLMALONATE SERPL-SCNC: 296 NMOL/L (ref 0–378)
MONOCYTES # BLD AUTO: 0.4 X10E3/UL (ref 0.1–0.9)
MONOCYTES NFR BLD AUTO: 6 %
NEUTROPHILS # BLD AUTO: 3.5 X10E3/UL (ref 1.4–7)
NEUTROPHILS NFR BLD AUTO: 54 %
PLATELET # BLD AUTO: 339 X10E3/UL (ref 150–379)
POTASSIUM SERPL-SCNC: 4.9 MMOL/L (ref 3.5–5.2)
PROT SERPL-MCNC: 7.7 G/DL (ref 6–8.5)
RBC # BLD AUTO: 4.39 X10E6/UL (ref 3.77–5.28)
SODIUM SERPL-SCNC: 143 MMOL/L (ref 134–144)
VIT B1 BLD-SCNC: 108.1 NMOL/L (ref 66.5–200)
WBC # BLD AUTO: 6.4 X10E3/UL (ref 3.4–10.8)

## 2018-08-21 ENCOUNTER — OFFICE VISIT CONVERTED (OUTPATIENT)
Dept: FAMILY MEDICINE CLINIC | Age: 53
End: 2018-08-21
Attending: FAMILY MEDICINE

## 2018-10-18 ENCOUNTER — CONVERSION ENCOUNTER (OUTPATIENT)
Dept: OTHER | Facility: HOSPITAL | Age: 53
End: 2018-10-18

## 2018-12-18 RX ORDER — MELOXICAM 7.5 MG/1
TABLET ORAL
Qty: 30 TABLET | Refills: 0 | Status: SHIPPED | OUTPATIENT
Start: 2018-12-18 | End: 2019-01-17 | Stop reason: SDUPTHER

## 2019-01-17 RX ORDER — MELOXICAM 7.5 MG/1
TABLET ORAL
Qty: 90 TABLET | Refills: 0 | Status: SHIPPED | OUTPATIENT
Start: 2019-01-17 | End: 2019-08-19 | Stop reason: SDUPTHER

## 2019-08-20 RX ORDER — MELOXICAM 7.5 MG/1
TABLET ORAL
Qty: 90 TABLET | Refills: 0 | Status: SHIPPED | OUTPATIENT
Start: 2019-08-20 | End: 2020-09-17

## 2019-10-25 ENCOUNTER — HOSPITAL ENCOUNTER (OUTPATIENT)
Dept: OTHER | Facility: HOSPITAL | Age: 54
Discharge: HOME | End: 2019-10-25
Attending: FAMILY MEDICINE

## 2019-10-29 ENCOUNTER — HOSPITAL ENCOUNTER (OUTPATIENT)
Dept: OTHER | Facility: HOSPITAL | Age: 54
Discharge: HOME | End: 2019-10-29
Attending: FAMILY MEDICINE

## 2019-11-08 ENCOUNTER — HOSPITAL ENCOUNTER (OUTPATIENT)
Dept: ULTRASOUND IMAGING | Facility: HOSPITAL | Age: 54
Discharge: HOME | End: 2019-11-08
Attending: FAMILY MEDICINE

## 2019-11-13 ENCOUNTER — OFFICE VISIT CONVERTED (OUTPATIENT)
Dept: FAMILY MEDICINE CLINIC | Age: 54
End: 2019-11-13
Attending: FAMILY MEDICINE

## 2019-11-14 ENCOUNTER — OFFICE VISIT CONVERTED (OUTPATIENT)
Dept: ONCOLOGY | Facility: HOSPITAL | Age: 54
End: 2019-11-14
Attending: INTERNAL MEDICINE

## 2019-11-14 ENCOUNTER — HOSPITAL ENCOUNTER (OUTPATIENT)
Dept: ONCOLOGY | Facility: HOSPITAL | Age: 54
Discharge: HOME | End: 2019-11-14
Attending: INTERNAL MEDICINE

## 2019-11-14 ENCOUNTER — TELEPHONE (OUTPATIENT)
Dept: ORTHOPEDIC SURGERY | Facility: CLINIC | Age: 54
End: 2019-11-14

## 2019-11-14 PROBLEM — C50.919: Status: ACTIVE | Noted: 2019-11-14

## 2019-11-14 NOTE — TELEPHONE ENCOUNTER
PATIENT RECENTLY DIAGNOSED WITH STAGE 2 BREAST CANCER. PATIENT HAS BEEN ADVISED TO INFORM ANY PHYSICIANS THAT SHE DOES HAVE A TOTAL KNEE REPLACEMENT

## 2019-11-19 LAB — CONV HER-2/NEU WITH INTERPRETATION BY IHC (TISSUE): NORMAL

## 2019-11-20 ENCOUNTER — OFFICE VISIT CONVERTED (OUTPATIENT)
Dept: SURGERY | Facility: CLINIC | Age: 54
End: 2019-11-20
Attending: SURGERY

## 2019-11-20 LAB — ERBB2 GENE DP TISS QL FISH: NORMAL

## 2019-11-27 ENCOUNTER — HOSPITAL ENCOUNTER (OUTPATIENT)
Dept: PREADMISSION TESTING | Facility: HOSPITAL | Age: 54
Discharge: HOME | End: 2019-11-27
Attending: SURGERY

## 2019-12-02 ENCOUNTER — HOSPITAL ENCOUNTER (OUTPATIENT)
Dept: PHYSICAL THERAPY | Facility: CLINIC | Age: 54
Setting detail: RECURRING SERIES
Discharge: STILL ADMITTED OR ADMINISTRATIVE | End: 2020-02-28
Attending: SURGERY

## 2019-12-03 ENCOUNTER — HOSPITAL ENCOUNTER (OUTPATIENT)
Dept: PERIOP | Facility: HOSPITAL | Age: 54
Setting detail: HOSPITAL OUTPATIENT SURGERY
Discharge: HOME | End: 2019-12-03
Attending: SURGERY

## 2019-12-12 ENCOUNTER — OFFICE VISIT CONVERTED (OUTPATIENT)
Dept: SURGERY | Facility: CLINIC | Age: 54
End: 2019-12-12
Attending: SURGERY

## 2019-12-18 ENCOUNTER — HOSPITAL ENCOUNTER (OUTPATIENT)
Dept: ONCOLOGY | Facility: HOSPITAL | Age: 54
Discharge: HOME | End: 2019-12-18
Attending: INTERNAL MEDICINE

## 2019-12-18 ENCOUNTER — OFFICE VISIT CONVERTED (OUTPATIENT)
Dept: ONCOLOGY | Facility: HOSPITAL | Age: 54
End: 2019-12-18
Attending: INTERNAL MEDICINE

## 2020-02-10 ENCOUNTER — HOSPITAL ENCOUNTER (OUTPATIENT)
Dept: ONCOLOGY | Facility: HOSPITAL | Age: 55
Discharge: HOME | End: 2020-02-10
Attending: INTERNAL MEDICINE

## 2020-02-10 ENCOUNTER — OFFICE VISIT CONVERTED (OUTPATIENT)
Dept: ONCOLOGY | Facility: HOSPITAL | Age: 55
End: 2020-02-10
Attending: INTERNAL MEDICINE

## 2020-03-12 ENCOUNTER — OFFICE VISIT CONVERTED (OUTPATIENT)
Dept: FAMILY MEDICINE CLINIC | Age: 55
End: 2020-03-12
Attending: FAMILY MEDICINE

## 2020-03-12 ENCOUNTER — HOSPITAL ENCOUNTER (OUTPATIENT)
Dept: OTHER | Facility: HOSPITAL | Age: 55
Discharge: HOME | End: 2020-03-12
Attending: FAMILY MEDICINE

## 2020-03-14 ENCOUNTER — HOSPITAL ENCOUNTER (OUTPATIENT)
Dept: OTHER | Facility: HOSPITAL | Age: 55
Discharge: HOME | End: 2020-03-14
Attending: FAMILY MEDICINE

## 2020-03-14 LAB
25(OH)D3 SERPL-MCNC: 30.6 NG/ML (ref 30–100)
ALBUMIN SERPL-MCNC: 4.6 G/DL (ref 3.5–5)
ALBUMIN/GLOB SERPL: 1.7 {RATIO} (ref 1.4–2.6)
ALP SERPL-CCNC: 124 U/L (ref 53–141)
ALT SERPL-CCNC: 57 U/L (ref 10–40)
ANION GAP SERPL CALC-SCNC: 20 MMOL/L (ref 8–19)
AST SERPL-CCNC: 35 U/L (ref 15–50)
BILIRUB SERPL-MCNC: 0.49 MG/DL (ref 0.2–1.3)
BUN SERPL-MCNC: 21 MG/DL (ref 5–25)
BUN/CREAT SERPL: 35 {RATIO} (ref 6–20)
CALCIUM SERPL-MCNC: 9.6 MG/DL (ref 8.7–10.4)
CHLORIDE SERPL-SCNC: 98 MMOL/L (ref 99–111)
CHOLEST SERPL-MCNC: 211 MG/DL (ref 107–200)
CHOLEST/HDLC SERPL: 3.5 {RATIO} (ref 3–6)
CONV CO2: 26 MMOL/L (ref 22–32)
CONV TOTAL PROTEIN: 7.3 G/DL (ref 6.3–8.2)
CREAT UR-MCNC: 0.6 MG/DL (ref 0.5–0.9)
GFR SERPLBLD BASED ON 1.73 SQ M-ARVRAT: >60 ML/MIN/{1.73_M2}
GLOBULIN UR ELPH-MCNC: 2.7 G/DL (ref 2–3.5)
GLUCOSE SERPL-MCNC: 109 MG/DL (ref 65–99)
HDLC SERPL-MCNC: 61 MG/DL (ref 40–60)
LDLC SERPL CALC-MCNC: 126 MG/DL (ref 70–100)
OSMOLALITY SERPL CALC.SUM OF ELEC: 292 MOSM/KG (ref 273–304)
POTASSIUM SERPL-SCNC: 4.5 MMOL/L (ref 3.5–5.3)
SODIUM SERPL-SCNC: 139 MMOL/L (ref 135–147)
TRIGL SERPL-MCNC: 122 MG/DL (ref 40–150)
VLDLC SERPL-MCNC: 24 MG/DL (ref 5–37)

## 2020-03-17 LAB
CONV LAST MENSTURAL PERIOD: NORMAL
SPECIMEN SOURCE: NORMAL
SPECIMEN SOURCE: NORMAL
THIN PREP CVX: NORMAL

## 2020-04-20 NOTE — TELEPHONE ENCOUNTER
SEN WITH [facility] CALLED TO SAY THE PATIENT IS HAVING SEVERE CALF PAIN. THERE IS NO REDNESS OR SWELLING BUT THEY ARE SENDING HER TO THE ER FOR A STAT DOPPLER TO CHECK FOR DVT   appt scheduled

## 2020-05-21 ENCOUNTER — OFFICE VISIT CONVERTED (OUTPATIENT)
Dept: ONCOLOGY | Facility: HOSPITAL | Age: 55
End: 2020-05-21
Attending: INTERNAL MEDICINE

## 2020-05-21 ENCOUNTER — HOSPITAL ENCOUNTER (OUTPATIENT)
Dept: ONCOLOGY | Facility: HOSPITAL | Age: 55
Discharge: HOME | End: 2020-05-21
Attending: INTERNAL MEDICINE

## 2020-09-14 DIAGNOSIS — M79.642 LEFT HAND PAIN: Primary | ICD-10-CM

## 2020-09-17 ENCOUNTER — HOSPITAL ENCOUNTER (OUTPATIENT)
Dept: OTHER | Facility: HOSPITAL | Age: 55
Discharge: HOME | End: 2020-09-17
Attending: ORTHOPAEDIC SURGERY

## 2020-09-17 ENCOUNTER — OFFICE VISIT (OUTPATIENT)
Dept: ORTHOPEDIC SURGERY | Facility: CLINIC | Age: 55
End: 2020-09-17

## 2020-09-17 VITALS — BODY MASS INDEX: 38.45 KG/M2 | WEIGHT: 217 LBS | HEIGHT: 63 IN

## 2020-09-17 DIAGNOSIS — M65.312 TRIGGER THUMB, LEFT THUMB: Primary | ICD-10-CM

## 2020-09-17 DIAGNOSIS — Z96.652 HISTORY OF TOTAL KNEE ARTHROPLASTY, LEFT: ICD-10-CM

## 2020-09-17 PROCEDURE — 99213 OFFICE O/P EST LOW 20 MIN: CPT | Performed by: ORTHOPAEDIC SURGERY

## 2020-09-17 RX ORDER — ACYCLOVIR 400 MG/1
TABLET ORAL
COMMUNITY
Start: 2020-08-20 | End: 2021-03-18

## 2020-09-17 RX ORDER — AMOXICILLIN 500 MG/1
CAPSULE ORAL
COMMUNITY
Start: 2020-09-03 | End: 2021-03-18

## 2020-09-17 RX ORDER — ANASTROZOLE 1 MG/1
TABLET ORAL
COMMUNITY
Start: 2020-06-22

## 2020-09-17 NOTE — PROGRESS NOTES
"NEW VISIT    Patient: Denia Wooten  ?  YOB: 1965    MRN: [id number]  ?  Chief Complaint   Patient presents with   • Left Hand - Pain   CC: Follow-up on left total knee arthroplasty from 2018 and left thumb locking and clicking.  ?  HPI:   Denia Arnold presents back to the office following left total knee arthroplasty performed by me in 2018.  She states that she has done extremely well post total knee arthroplasty.  She states \"my knee functions really well and I am pleased with\".  She does not have any pain or discomfort.  She is now having pain and discomfort at her left thumb.  The pain is at the base of the MCP joint of the thumb.  She does have some early locking and clicking of the thumb.  We have discussed the pathology of her trigger finger.  She is very much scared of any needles and does not want a steroid shot.  She states that her symptoms are not bad enough to consider surgical release of the A1 pulley at this point.      Pain Location: LEFT hand  Radiation: none  Quality: aching, popping  Intensity/Severity: mild-moderate  Duration: 1 month  Onset quality: gradual   Timing: constant  Aggravating Factors: writing, rotating motion, repetitive motion, touching   Alleviating Factors: NSAIDs  Previous Episodes: no  Associated Symptoms: pain, clicking/popping, decreased strength  ADLs Affected: grooming/hygiene/toileting/personal care, work related activities, recreational activities/sports, writing  Previous Treatment: Aleve    This patient is an established patient.  This problem is new to this examiner.      Allergies:   Allergies   Allergen Reactions   • Neosporin Plus Max St Rash       Medications:   Home Medications:  Current Outpatient Medications on File Prior to Visit   Medication Sig   • acyclovir (ZOVIRAX) 400 MG tablet    • amoxicillin (AMOXIL) 500 MG capsule    • anastrozole (ARIMIDEX) 1 MG tablet    • cholecalciferol (VITAMIN D3) 1000 UNITS tablet Take 1,000 " Units by mouth Every Night.   • melatonin 5 MG tablet tablet Take 10 mg by mouth Every Night.   • Multiple Vitamins-Minerals (CENTRUM ADULTS PO) Take 1 tablet by mouth Daily.   • traZODone (DESYREL) 150 MG tablet Take 75 mg by mouth Every Night.     No current facility-administered medications on file prior to visit.      Current Medications:  Scheduled Meds:  PRN Meds:.    I have reviewed the patient's medical history in detail and updated the computerized patient record.  Review and summarization of old records include:    Past Medical History:   Diagnosis Date   • Abnormal chest xray     decreased lung volumes   • Allergic rhinitis    • Diabetes mellitus type 2 in obese (CMS/HCC)     borderline, stopped Metformin 2 months ago.     • Dyspepsia    • Dyspnea on exertion    • Fatigue    • Hypercholesteremia     per prim note, no meds   • Hypertension    • Insomnia    • Joint pain    • Nephrolithiasis    • Normocytic anemia     12.4/37.2   • Obesity    • Obstructive sleep apnea     CPAP says noncompliant - marlin her/claustrophobic   • Osteoarthritis of both knees     per prim.  On diclofenac daily, prn Aleve   • Peripheral edema    • Restless leg syndrome      Past Surgical History:   Procedure Laterality Date   • BILATERAL BREAST REDUCTION     • COLONOSCOPY  2014    unremarkable   • GASTRIC BANDING  2007    s/p LAGB APS 2007 GDW, replaced w/ APL 2010 GDW for band slip, followed by AGBR 9/2016 d/t band intolerance.   • GASTRIC SLEEVE LAPAROSCOPIC N/A 2/9/2017    Procedure: GASTRIC SLEEVE LAPAROSCOPIC;  Surgeon: Johnathan Burkett MD;  Location:  [facility];  Service:    • KIDNEY STONE SURGERY  2013   • [place] LAP,ESOPHAGOGAST FUNDOPLASTY N/A 2/9/2017    Procedure: HIATAL HERNIA REPAIR LAPAROSCOPIC;  Surgeon: Johnathan Burkett MD;  Location:  [facility];  Service: Bariatric     Social History     Occupational History   • Occupation:  - walking route   Tobacco Use   • Smoking status: Former Smoker      "Packs/day: 1.50     Years: 25.00     Pack years: 37.50     Quit date:      Years since quittin.7   • Smokeless tobacco: Never Used   Substance and Sexual Activity   • Alcohol use: Yes     Comment: wine on occasion   • Drug use: No   • Sexual activity: Defer     Comment:       Family History   Problem Relation Age of Onset   • Hypertension Mother    • Diabetes Mother    • Hypertension Father    • Sleep apnea Father    • Stroke Father    • Cancer Maternal Grandmother    • Stomach cancer Maternal Grandmother    • Cancer Maternal Grandfather    • Hypertension Maternal Grandfather    • Diabetes Maternal Grandfather    • Stroke Maternal Grandfather    • Colon cancer Maternal Grandfather    • Heart attack Paternal Grandfather          Review of Systems   Constitutional: Negative.  Negative for fever.   Eyes: Negative.    Respiratory: Negative.    Cardiovascular: Negative.    Endocrine: Negative.    Musculoskeletal: Positive for arthralgias, gait problem and joint swelling.   Skin: Negative.  Negative for rash and wound.   Allergic/Immunologic: Negative.    Neurological: Negative for numbness.   Hematological: Negative.    Psychiatric/Behavioral: Negative.           Wt Readings from Last 3 Encounters:   20 98.4 kg (217 lb)   18 90.5 kg (199 lb 8.2 oz)   17 85.7 kg (189 lb)     Ht Readings from Last 3 Encounters:   20 160 cm (63\")   18 160 cm (63\")   17 160 cm (63\")     Body mass index is 38.44 kg/m².  Facility age limit for growth percentiles is 20 years.  There were no vitals filed for this visit.      Physical Exam  Constitutional: Patient is oriented to person, place, and time. Appears well-developed and well-nourished.   HENT:   Head: Normocephalic and atraumatic.   Eyes: Conjunctivae and EOM are normal. Pupils are equal, round, and reactive to light.   Cardiovascular: Normal rate, regular rhythm, normal heart sounds and intact distal pulses.   Pulmonary/Chest: Effort " normal and breath sounds normal.   Musculoskeletal:   See detailed exam below   Neurological: Alert and oriented to person, place, and time. No sensory deficit. Coordination normal.   Skin: Skin is warm and dry. Capillary refill takes less than 2 seconds. No rash noted. No erythema.   Psychiatric: Patient has a normal mood and affect. Her behavior is normal. Judgment and thought content normal.   Nursing note and vitals reviewed.      Ortho Exam:   Left hand-trigger. The patient is right hand dominant. Trigger thumb. Skin is swollen over volar aspect of MCP joint. Flexor and extensor tendon functions are both well maintained. Tender nodule over the volar aspect of MCP joint. Capillary refill is two seconds with a brisk return. Triggering is passively correctable. Median nerve function is normal. Radial artery pulse is palpable.    Left knee.The patient is status post total knee arthroplasty postoperative 2 year(s). Incision is clean. Calf is soft and nontender. Homans sign is negative. There is no clicking, popping or catching. Anterior and posterior drawer signs are negative.  There is no instability of the components. Appropriate amounts of swelling and bruising are noted. Dorsalis pedis and posterior tibial artery pulses are palpable. Common peroneal nerve function is well preserved. Range of motion is from 0-120 degrees of flexion. Gait is cautious but otherwise fairly normal. There is no evidence of a deep seated joint infection.      Diagnostics:  Left hand xrays ap/lateral views were ordered by Justin Joy MD and performed at [facility]. These images were independently viewed and interpreted by myself, my impression as follows:    left Hand X-Ray  Indication: pain  AP, Lateral views  Findings: Slight narrowing of the MCP joint surface with a small osteophyte present.  no bony lesion  Soft tissues within normal limits  within normal limits joint spaces  Hardware appropriately positioned not  applicable      no prior studies available for comparison.    X-RAY was ordered and reviewed by Justin Joy MD    Assessment:  Denia was seen today for pain.    Diagnoses and all orders for this visit:    Trigger thumb, left thumb    History of total knee arthroplasty, left          Procedures  ?    Plan    · Compression/brace to the hand to minimize clicking locking and catching.  · Rest, ice, compression, and elevation (RICE) therapy  · Stretching and strengthening exercises  · Alternate Ibuprofen and Tylenol as needed  · Follow up in 6 month(s).  · , GI and dental procedure prophylaxis with antibiotics to prevent metastatic infection of the knee arthroplasty implants.  · Calcium and vitamin D for bone health.  · Discussed left thumb injection for relief   · Risks of surgical procedure, possibility of infection, DVT, continued pain, limited strength and range of motion, neurological deficit, scar tissue formation, recurrence of triggering and further surgical intervention discussed with the patient. Patient understands that surgery will benefit triggering.      Date of encounter: 09/17/2020   Justin Joy MD

## 2020-09-24 ENCOUNTER — HOSPITAL ENCOUNTER (OUTPATIENT)
Dept: OTHER | Facility: HOSPITAL | Age: 55
Discharge: HOME | End: 2020-09-24
Attending: INTERNAL MEDICINE

## 2020-09-24 LAB
ALBUMIN SERPL-MCNC: 4.3 G/DL (ref 3.5–5)
ALBUMIN/GLOB SERPL: 1.8 {RATIO} (ref 1.4–2.6)
ALP SERPL-CCNC: 122 U/L (ref 53–141)
ALT SERPL-CCNC: 29 U/L (ref 10–40)
ANION GAP SERPL CALC-SCNC: 16 MMOL/L (ref 8–19)
AST SERPL-CCNC: 24 U/L (ref 15–50)
BASOPHILS # BLD MANUAL: 0.03 10*3/UL (ref 0–0.2)
BASOPHILS NFR BLD MANUAL: 0.6 % (ref 0–3)
BILIRUB SERPL-MCNC: 0.2 MG/DL (ref 0.2–1.3)
BUN SERPL-MCNC: 21 MG/DL (ref 5–25)
BUN/CREAT SERPL: 30 {RATIO} (ref 6–20)
CALCIUM SERPL-MCNC: 9.1 MG/DL (ref 8.7–10.4)
CHLORIDE SERPL-SCNC: 101 MMOL/L (ref 99–111)
CONV CO2: 28 MMOL/L (ref 22–32)
CONV TOTAL PROTEIN: 6.7 G/DL (ref 6.3–8.2)
CREAT UR-MCNC: 0.69 MG/DL (ref 0.5–0.9)
DEPRECATED RDW RBC AUTO: 41.7 FL
EOSINOPHIL # BLD MANUAL: 0.18 10*3/UL (ref 0–0.7)
EOSINOPHIL NFR BLD MANUAL: 3.6 % (ref 0–7)
ERYTHROCYTE [DISTWIDTH] IN BLOOD BY AUTOMATED COUNT: 12.4 % (ref 11.5–14.5)
GFR SERPLBLD BASED ON 1.73 SQ M-ARVRAT: >60 ML/MIN/{1.73_M2}
GLOBULIN UR ELPH-MCNC: 2.4 G/DL (ref 2–3.5)
GLUCOSE SERPL-MCNC: 191 MG/DL (ref 65–99)
GRANS (ABSOLUTE): 2.89 10*3/UL (ref 2–8)
GRANS: 56.9 % (ref 30–85)
HBA1C MFR BLD: 12.5 G/DL (ref 12–16)
HCT VFR BLD AUTO: 37.3 % (ref 37–47)
IMM GRANULOCYTES # BLD: 0 10*3/UL (ref 0–0.54)
IMM GRANULOCYTES NFR BLD: 0 % (ref 0–0.43)
LYMPHOCYTES # BLD MANUAL: 1.62 10*3/UL (ref 1–5)
LYMPHOCYTES NFR BLD MANUAL: 6.9 % (ref 3–10)
MCH RBC QN AUTO: 30.7 PG (ref 27–31)
MCHC RBC AUTO-ENTMCNC: 33.5 G/DL (ref 33–37)
MCV RBC AUTO: 91.6 FL (ref 81–99)
MONOCYTES # BLD AUTO: 0.35 10*3/UL (ref 0.2–1.2)
OSMOLALITY SERPL CALC.SUM OF ELEC: 298 MOSM/KG (ref 273–304)
PLATELET # BLD AUTO: 276 10*3/UL (ref 130–400)
PMV BLD AUTO: 9.3 FL (ref 7.4–10.4)
POTASSIUM SERPL-SCNC: 4.5 MMOL/L (ref 3.5–5.3)
RBC # BLD AUTO: 4.07 10*6/UL (ref 4.2–5.4)
SODIUM SERPL-SCNC: 140 MMOL/L (ref 135–147)
VARIANT LYMPHS NFR BLD MANUAL: 32 % (ref 20–45)
WBC # BLD AUTO: 5.07 10*3/UL (ref 4.8–10.8)

## 2020-10-02 ENCOUNTER — TELEPHONE (OUTPATIENT)
Dept: ORTHOPEDIC SURGERY | Facility: CLINIC | Age: 55
End: 2020-10-02

## 2020-12-01 ENCOUNTER — TELEPHONE (OUTPATIENT)
Dept: ORTHOPEDIC SURGERY | Facility: CLINIC | Age: 55
End: 2020-12-01

## 2020-12-01 NOTE — TELEPHONE ENCOUNTER
LEFT A MESSAGE LETTING HER KNOW THAT THE January SCHEDULE IS NOW AVAILABLE AND WE CAN SCHEDULE HER SURGERY AT HER CONVENIENCE. I ASKED THAT SHE CALL ME BACK TO SCHEDULE./AW

## 2020-12-08 ENCOUNTER — HOSPITAL ENCOUNTER (OUTPATIENT)
Dept: OTHER | Facility: HOSPITAL | Age: 55
Discharge: HOME | End: 2020-12-08
Attending: FAMILY MEDICINE

## 2020-12-10 LAB — SARS-COV-2 RNA SPEC QL NAA+PROBE: DETECTED

## 2021-01-13 ENCOUNTER — TELEPHONE (OUTPATIENT)
Dept: ORTHOPEDIC SURGERY | Facility: CLINIC | Age: 56
End: 2021-01-13

## 2021-01-13 NOTE — TELEPHONE ENCOUNTER
PATIENT STATED THAT SHE IS SCHEDULED FOR COVID TEST ON 1- IN THE AM, AND WOULD LIKE TO ADJUST APPT FOR THE AFTERNOON IF POSSIBLE. I AM NOT SEEING THE APPT IN FUTURE APPTS.    MATTHEW NEELY MAY BE REACHED AT:  [phone]

## 2021-01-25 ENCOUNTER — OUTSIDE FACILITY SERVICE (OUTPATIENT)
Dept: ORTHOPEDIC SURGERY | Facility: CLINIC | Age: 56
End: 2021-01-25

## 2021-01-25 DIAGNOSIS — Z98.890 S/P TRIGGER FINGER RELEASE: Primary | ICD-10-CM

## 2021-01-25 PROCEDURE — 26055 INCISE FINGER TENDON SHEATH: CPT | Performed by: ORTHOPAEDIC SURGERY

## 2021-01-25 RX ORDER — HYDROCODONE BITARTRATE AND ACETAMINOPHEN 5; 325 MG/1; MG/1
TABLET ORAL
Qty: 30 TABLET | Refills: 0 | Status: SHIPPED | OUTPATIENT
Start: 2021-01-25 | End: 2021-01-29 | Stop reason: SDUPTHER

## 2021-01-26 ENCOUNTER — TELEPHONE (OUTPATIENT)
Dept: ORTHOPEDIC SURGERY | Facility: CLINIC | Age: 56
End: 2021-01-26

## 2021-01-26 NOTE — TELEPHONE ENCOUNTER
CALLED AND LET PATIENT KNOW THAT [facility] ISN'T SCHEDULING IN PERSON APPOINTMENTS FOR Friday. SHE UNDERSTOOD./AW

## 2021-01-26 NOTE — TELEPHONE ENCOUNTER
PATIENT HAS POST OP APPT W/[facility] 2- AT 9:30. SHE WOULD LIKE TO SEE IF THERE IS ANY WAY OF COMING IN LATER AFTERNOON IF POSSIBLE.    MATTHEW NEELY MAY BE REACHED AT:  [phone]

## 2021-01-29 ENCOUNTER — OFFICE VISIT (OUTPATIENT)
Dept: ORTHOPEDIC SURGERY | Facility: CLINIC | Age: 56
End: 2021-01-29

## 2021-01-29 VITALS — BODY MASS INDEX: 38.45 KG/M2 | HEIGHT: 63 IN | TEMPERATURE: 97 F | WEIGHT: 217 LBS

## 2021-01-29 DIAGNOSIS — Z98.890 S/P TRIGGER FINGER RELEASE: ICD-10-CM

## 2021-01-29 DIAGNOSIS — Z98.890 S/P TRIGGER FINGER RELEASE: Primary | ICD-10-CM

## 2021-01-29 PROCEDURE — 99024 POSTOP FOLLOW-UP VISIT: CPT | Performed by: PHYSICIAN ASSISTANT

## 2021-01-29 RX ORDER — HYDROCODONE BITARTRATE AND ACETAMINOPHEN 5; 325 MG/1; MG/1
TABLET ORAL
Qty: 30 TABLET | Refills: 0 | Status: SHIPPED | OUTPATIENT
Start: 2021-01-29 | End: 2021-03-18

## 2021-01-29 NOTE — PROGRESS NOTES
OTHER POST OP GLOBAL     NAME: Denia Wooten  ?  : 1965  ?  MRN: [id number]    Chief Complaint   Patient presents with   • Left Hand - Post-op     ?  Date of surgery: 21    HPI:   Patient returns today for 4 day follow up of left trigger finger release of thumb. Incision(s) healing nicely/as expected with no signs of infection, there is slight separation of the wound edges. Patient reports doing well with no unusual complaints. Appears to be progressing appropriately.      Ortho Exam:   Left hand-trigger finger.   The patient is 4 day(s) status post trigger finger release of the A1 pulley of left thumb.   Flexor and extensor tendon functions are both well preserved.   Neurovascular status is intact. There is more clicking, locking, or catching.   Appropriate amounts of swelling and bruising are noted.   Mild swelling is noted consistent with post-surgical status.   No evidence of neurovascular compromise is noted.   The patient is able to make a fist although it is still a little slow and somewhat painful.   Mildly tender over the site of the release.      Assessment:  Diagnoses and all orders for this visit:    1. S/P trigger finger release (Primary)          Plan   • Incision care  • Continue ice as needed  • Gentle active ROM  • Alternate Ibuprofen and Tylenol as needed  • Fall precautions  • Follow up in 4-6 weeks for recheck  • Discussed her RTW possibly being , she will let our office know how she is doing    Date of encounter: 2021  Garry Coronado PA-C      Electronically signed by Garry Coronado PA-C, 21, 10:04 AM EST.

## 2021-02-01 ENCOUNTER — TELEPHONE (OUTPATIENT)
Dept: ORTHOPEDIC SURGERY | Facility: CLINIC | Age: 56
End: 2021-02-01

## 2021-02-01 NOTE — TELEPHONE ENCOUNTER
Please let the patient have a note to return back to work on the day that she has requested thank you

## 2021-02-01 NOTE — TELEPHONE ENCOUNTER
Caller: MATTHEW NEELY     Relationship: SELF    Best call back number: [phone]    What form or medical record are you requesting: RETURN BACK TO WORK     Who is requesting this form or medical record from you: PATIENT    How would you like to receive the form or medical records (pick-up, mail, fax): PATIENT WOULD LIKE IT TO BE POSTED TO MY CHART     Timeframe paperwork needed: PATIENT WOULD LIKE TO GO BACK TO WORK ON 02/08/21

## 2021-03-18 ENCOUNTER — OFFICE VISIT (OUTPATIENT)
Dept: ORTHOPEDIC SURGERY | Facility: CLINIC | Age: 56
End: 2021-03-18

## 2021-03-18 VITALS — WEIGHT: 204 LBS | BODY MASS INDEX: 36.14 KG/M2 | HEIGHT: 63 IN | TEMPERATURE: 97.5 F

## 2021-03-18 DIAGNOSIS — Z98.890 S/P TRIGGER FINGER RELEASE: Primary | ICD-10-CM

## 2021-03-18 DIAGNOSIS — M65.312 TRIGGER THUMB, LEFT THUMB: ICD-10-CM

## 2021-03-18 PROCEDURE — 99024 POSTOP FOLLOW-UP VISIT: CPT | Performed by: ORTHOPAEDIC SURGERY

## 2021-03-18 NOTE — PROGRESS NOTES
OTHER POST OP GLOBAL     NAME: Denia Wooten  ?  : 1965  ?  MRN: [id number]    Chief Complaint   Patient presents with   • Left Thumb - Post-op, Follow-up     ?  Date of surgery: 2021    HPI:   Patient returns today for 8 week follow up of left trigger finger. Incision(s) healed nicely with no signs of infection. Patient reports doing well with no unusual complaints. Appears to be progressing appropriately.  She is doing extremely well postoperatively.  There is no clicking or locking of the trigger thumb at this point.  She is very pleased with her outcome.      Ortho Exam:   Left wrist/hand-trigger finger. The patient is 8 week(s) status post trigger finger release of the A1 pulley. Flexor and extensor tendon functions are both well preserved. Neurovascular status is intact. There is more clicking, locking, or catching. Appropriate amounts of swelling and bruising are noted. Mild swelling is noted consistent with post-surgical status. No evidence of neurovascular compromise is noted. The patient is able to make a fist although it is still a little slow and somewhat painful. Mildly tender over the site of the release.      Diagnostic Studies:  no diagnostic testing performed this visit      Assessment:  Diagnoses and all orders for this visit:    1. S/P trigger finger release (Primary)    2. Trigger thumb, left thumb          Plan   • Incision care  • Continue ice as needed  • Active/Active Assisted ROM of elbow, wrist and hand  • Stretching and strengthening exercises  • Alternate Ibuprofen and Tylenol as needed  • Fall precautions  • Follow up as needed     Date of encounter: 2021  Justin Joy MD

## 2021-04-06 ENCOUNTER — OFFICE VISIT CONVERTED (OUTPATIENT)
Dept: FAMILY MEDICINE CLINIC | Age: 56
End: 2021-04-06
Attending: FAMILY MEDICINE

## 2021-04-06 ENCOUNTER — HOSPITAL ENCOUNTER (OUTPATIENT)
Dept: OTHER | Facility: HOSPITAL | Age: 56
Discharge: HOME | End: 2021-04-06
Attending: FAMILY MEDICINE

## 2021-04-08 ENCOUNTER — HOSPITAL ENCOUNTER (OUTPATIENT)
Dept: OTHER | Facility: HOSPITAL | Age: 56
Discharge: HOME | End: 2021-04-08
Attending: FAMILY MEDICINE

## 2021-04-08 LAB
25(OH)D3 SERPL-MCNC: 36.8 NG/ML (ref 30–100)
ALBUMIN SERPL-MCNC: 4.4 G/DL (ref 3.5–5)
ALBUMIN/GLOB SERPL: 1.8 {RATIO} (ref 1.4–2.6)
ALP SERPL-CCNC: 120 U/L (ref 53–141)
ALT SERPL-CCNC: 27 U/L (ref 10–40)
ANION GAP SERPL CALC-SCNC: 15 MMOL/L (ref 8–19)
AST SERPL-CCNC: 23 U/L (ref 15–50)
BILIRUB SERPL-MCNC: 0.41 MG/DL (ref 0.2–1.3)
BUN SERPL-MCNC: 20 MG/DL (ref 5–25)
BUN/CREAT SERPL: 34 {RATIO} (ref 6–20)
CALCIUM SERPL-MCNC: 9.3 MG/DL (ref 8.7–10.4)
CHLORIDE SERPL-SCNC: 104 MMOL/L (ref 99–111)
CHOLEST SERPL-MCNC: 188 MG/DL (ref 107–200)
CHOLEST/HDLC SERPL: 3.5 {RATIO} (ref 3–6)
CONV CO2: 27 MMOL/L (ref 22–32)
CONV TOTAL PROTEIN: 6.9 G/DL (ref 6.3–8.2)
CREAT UR-MCNC: 0.58 MG/DL (ref 0.5–0.9)
GFR SERPLBLD BASED ON 1.73 SQ M-ARVRAT: >60 ML/MIN/{1.73_M2}
GLOBULIN UR ELPH-MCNC: 2.5 G/DL (ref 2–3.5)
GLUCOSE SERPL-MCNC: 96 MG/DL (ref 65–99)
HDLC SERPL-MCNC: 53 MG/DL (ref 40–60)
LDLC SERPL CALC-MCNC: 120 MG/DL (ref 70–100)
OSMOLALITY SERPL CALC.SUM OF ELEC: 294 MOSM/KG (ref 273–304)
POTASSIUM SERPL-SCNC: 4.5 MMOL/L (ref 3.5–5.3)
SODIUM SERPL-SCNC: 141 MMOL/L (ref 135–147)
TRIGL SERPL-MCNC: 75 MG/DL (ref 40–150)
VLDLC SERPL-MCNC: 15 MG/DL (ref 5–37)

## 2021-04-12 LAB
CONV LAST MENSTURAL PERIOD: NORMAL
HPV HYBRID CAPTURE HIGH RISK: NEGATIVE
SPECIMEN SOURCE: NORMAL
SPECIMEN SOURCE: NORMAL
THIN PREP CVX: NORMAL

## 2021-05-15 VITALS — HEIGHT: 63 IN | WEIGHT: 222 LBS | RESPIRATION RATE: 14 BRPM | BODY MASS INDEX: 39.34 KG/M2

## 2021-05-18 ENCOUNTER — HOSPITAL ENCOUNTER (OUTPATIENT)
Dept: OTHER | Facility: HOSPITAL | Age: 56
Discharge: HOME | End: 2021-05-18
Attending: FAMILY MEDICINE

## 2021-05-18 ENCOUNTER — OFFICE VISIT CONVERTED (OUTPATIENT)
Dept: FAMILY MEDICINE CLINIC | Age: 56
End: 2021-05-18
Attending: FAMILY MEDICINE

## 2021-05-18 NOTE — PROGRESS NOTES
Je Denia TIFFANY 1965     Office/Outpatient Visit    Visit Date: Tue, May 8, 2018 02:59 pm    Provider: Mariel Lima MD (Assistant: Ani Watkins MA)    Location: [facility]        Electronically signed by Mariel Lima MD on  05/16/2018 01:53:06 PM                             SUBJECTIVE:        CC: follow up for chol and BP, she needs labs         HPI:     chronic joint pain due to OA-she is on meloxicam OTC     vitamin D def, she is off her vitamin D supplementation     chronic insomnia, she is stable and doing well with trazodone and melatonin         Essential hypercholesterolemia details; date of diagnosis 2011.  Current treatment includes a low cholesterol/low fat diet.  Compliance with treatment has been good; she takes her medication as directed and follows up as directed.  She denies experiencing any hypercholesterolemia related symptoms.  Most recent lab tests include Vitamin D, 25-Hydroxy:  32.7 (ng/mL) (12/29/2015), Creatinine, Serum:  0.57 (mg/dl) (09/07/2017), Glom Filt Rate, Est:  >60 (ml/min/1.73m2) (09/07/2017), HDL:  70 (mg/dL) (09/07/2017), LDL:  122 (mg/dL) (09/07/2017), Total Cholesterol:  214 (mg/dL) (09/07/2017), Triglycerides:  109 (mg/dL) (09/07/2017).          Additionally, she presents with history of hypertension.  current nonpharmacologic treatment includes low sodium diet.  Her current cardiac medication regimen includes off BB.  She has not kept a blood pressure diary, but states that pressures have been okay.  She is tolerating the medication well without side effects.  Compliance with treatment has been good; she takes her medication as directed and follows up as directed.      L wrist pain, samuel with overuse and she has a small cyst present     ROS:     CONSTITUTIONAL:  Positive for fatigue.   Negative for chills or fever.      EYES:  Negative for blurred vision, eye pain, and photophobia.      CARDIOVASCULAR:  Negative for chest pain, palpitations,  tachycardia, orthopnea, and edema.      RESPIRATORY:  Negative for cough, dyspnea, and hemoptysis.      GASTROINTESTINAL:  Negative for abdominal pain, heartburn, constipation, diarrhea, and stool changes.      INTEGUMENTARY/BREAST:  Negative for rash.      NEUROLOGICAL:  Negative for dizziness and weakness.      PSYCHIATRIC:  Negative for anxiety, depression, and sleep disturbances.          PMH/FMH/SH:     Last Reviewed on 2018 03:38 PM by Mariel Lima    Past Medical History:         borderline Hypertension     Peptic Ulcer Disease: dx'd at age [age];     h/o freq UTI's     chronic knee pain R knee    back pain     diet controlled hyperglycemia     Obesity: morbid;     LE edema         GYNECOLOGICAL HISTORY:             PREVENTIVE HEALTH MAINTENANCE             COLORECTAL CANCER SCREENING: colonoscopy with normal results     MAMMOGRAM: Done within last 2 years and results in are chart was last done 10-10-17 with normal results         Surgical History:         Breast reduction-    Foot surgery      Lap Band-      L TKR 10/2017;         Family History:         Positive for Coronary Artery Disease ( father; mother -- h/o CABG X3 ), Hyperlipidemia ( father ) and Hypertension ( father; mother ).      Positive for Cancer- type not specified ( mat. aunt ).      Positive for sleep apnea in father.      Positive for Type 2 Diabetes ( mother; sister; mat. GF ).          Social History:     Occupation: [employer] in [place]     Marital Status:      Children: None         Tobacco/Alcohol/Supplements:     Last Reviewed on 2018 03:38 PM by Mariel Lima    Tobacco: She has a past history of cigarette smoking; quit date:  .              Allergies:     Last Reviewed on 10/25/2017 11:05 AM by Francheska Williamson    Sulfas:        Current Medications:     Last Reviewed on 10/25/2017 11:05 AM by Francheska Williamson    Trazodone HCl 150mg Tablet 1/2 - 1 TABLET QHS prn     Aleve 220mg  Take one tablet every 4-6 hours as needed     Aspirin (ASA) 325mg Caplet 1 tab daily     Biotin 5,000mcg Take one tablet once daily to support hair, skin and nails     Colace Regular strength Take one tablet once daily as needed     Hydrocodone/Acetaminophen 5mg/325mg Tablet 1 every 6 hours prn pain     Xarelto 10mg Tablet 1 tab daily     Vitamin B12     centrum multivitamin 1 tablet daily     Vitamin D3 1,000IU Capsules take 1 capsule daily     Melatonin         OBJECTIVE:        Vitals:         Current: 5/8/2018 3:02:56 PM    Ht:  5 ft, 3 in;  Wt: 204.3 lbs;  BMI: 36.2    T: 96.6 F (oral);  BP: 130/83 mm Hg (left arm, sitting);  P: 83 bpm (left arm (BP Cuff), sitting);  sCr: 0.57 mg/dL;  GFR: 127.52        Exams:     PHYSICAL EXAM:     GENERAL: vital signs recorded - well developed, well nourished;  no apparent distress;     E/N/T: OROPHARYNX:  normal mucosa, dentition, gingiva, and posterior pharynx;     NECK: range of motion is normal; thyroid is non-palpable;     RESPIRATORY: normal respiratory rate and pattern with no distress; normal breath sounds with no rales, rhonchi, wheezes or rubs;     CARDIOVASCULAR: normal rate; rhythm is regular;  no systolic murmur; no edema;     GASTROINTESTINAL: nontender; normal bowel sounds; no organomegaly;     NEUROLOGIC: mental status: oriented to person, place, and time;  GROSSLY INTACT     PSYCHIATRIC:  appropriate affect and demeanor; normal speech pattern; grossly normal memory;         Procedures:     Vaccination against hepatitis A     1. Hepatitis A (adult): 1.0 ml given IM in the left upper arm; administered by mlb;  lot number ; expires 11-; Information sheet given             ASSESSMENT           715.09   M15.0  Generalized osteoarthritis              DDx:     268.9   E55.9  Vitamin D deficiency, unspecified              DDx:     307.42   F51.01  Chronic insomnia              DDx:     272.0   E78.5  Essential hypercholesterolemia              DDx:      401.1   I10  Hypertension              DDx:     727.49   M67.442  Cyst of ganglion, NEC              DDx:     V05.3   Z23  Vaccination against hepatitis A              DDx:         ORDERS:         Lab Orders:       [id number]  VITD - [facility] Vitamin D, 25 Hydroxy  (Send-Out)         52439  PHYSF - [facility] PHYSICAL: CMP, CBC, TSH, LIPID: [id number], [id number], [id number], [id number]  (Send-Out)           Procedures Ordered:       96577  Immunization administration; one vaccine  (In-House)         64922  HepA vaccine adult dose for intramuscular use  (In-House)                   PLAN:          Generalized osteoarthritis cont meloxicam prn         RECOMMENDATIONS given include: try NSAID, patient  is told to  watch for upset stomach, PUD/GI bleeding and aware of increased risk of CAD.           Vitamin D deficiency, unspecified told her to start vitamin D 2000 units a day     LABORATORY:  Labs ordered to be performed today include Vitamin D.            Orders:       57978  VITD - [facility] Vitamin D, 25 Hydroxy  (Send-Out)            Chronic insomnia chronic insomnia, she is stable and doing well with trazodone and melatonin          Essential hypercholesterolemia diet controlled, due for labs     LABORATORY:  Labs ordered to be performed today include PHYSICAL PANEL; CMP, CBC, TSH, LIPID.            Orders:       08187  PHYSF - [facility] PHYSICAL: CMP, CBC, TSH, LIPID: [id number], [id number], [id number], [id number]  (Send-Out)            Hypertension stable,  not on any meds          Cyst of ganglion, NEC she will show Dr Joy next week          Vaccination against hepatitis A           Orders:       65527  Immunization administration; one vaccine  (In-House)         40053  HepA vaccine adult dose for intramuscular use  (In-House)               CHARGE CAPTURE           **Please note: ICD descriptions below are intended for billing purposes only and may not represent clinical diagnoses**        Primary Diagnosis:         715.09 Generalized osteoarthritis            M15.0     Primary generalized (osteo)arthritis              Orders:          39561   Office/outpatient visit; established patient, level 4  (In-House)           268.9 Vitamin D deficiency, unspecified            E55.9    Vitamin D deficiency, unspecified    307.42 Chronic insomnia            F51.01    Primary insomnia    272.0 Essential hypercholesterolemia            E78.5    Hyperlipidemia, unspecified    401.1 Hypertension            I10    Essential (primary) hypertension    727.49 Cyst of ganglion, NEC            M67.442    Ganglion, left hand    V05.3 Vaccination against hepatitis A            Z23    Encounter for immunization              Orders:          14315   Immunization administration; one vaccine  (In-House)             40696   HepA vaccine adult dose for intramuscular use  (In-House)

## 2021-05-18 NOTE — PROGRESS NOTES
Je Denia TIFFANY 1965     Office/Outpatient Visit    Visit Date: Tue, Aug 21, 2018 02:35 pm    Provider: Mariel Lima MD (Assistant: Tona Mo LPN)    Location: [facility]        Electronically signed by Mariel Lima MD on  08/28/2018 11:16:50 AM                             SUBJECTIVE:        CC: WWE         HPI:         Patient complains of well Woman Exam.  Her last physical exam was 1 year ago.  She is menopausal.  She is not currently using any form of contraception.  She performs breast self-exams occasionally.    Her last Pap smear was in 8/4/17 and was normal.   Her last mammogram was in 9/23/16 and was normal.   Her last DEXA was in 9/11/15 and was normal.   She underwent colonoscopy 2 years ago with normal results.   She's had vision screening done in 4/2018.   Preventative Health updated today.  She is current with her Td and influenza immunization.  Joanie denies any history of abnormal Pap smears.  Tobacco: She has a past history of cigarette smoking; quit date:  2001.              PHQ-9 Depression Screening: Completed form scanned and in chart; Total Score 1 Alcohol Consumption Screening: Completed form scanned and in chart; Total Score 1     ROS:     CONSTITUTIONAL:  Negative for chills, fatigue, fever, and weight change.      EYES:  Negative for blurred vision, eye pain, and photophobia.      E/N/T:  Negative for ear pain, nasal congestion, frequent rhinorrhea and sore throat.      CARDIOVASCULAR:  Negative for chest pain, palpitations, tachycardia, orthopnea, and edema.      RESPIRATORY:  Negative for cough, dyspnea, and hemoptysis.      GASTROINTESTINAL:  Negative for abdominal pain, heartburn, constipation, diarrhea, and stool changes.      GENITOURINARY:  Negative for genital lesions, hematuria, menstrual problems, polyuria, abnormal vaginal bleeding, and vaginal discharge.      MUSCULOSKELETAL:  Negative for arthralgias, back pain, and myalgias.       INTEGUMENTARY/BREAST:  Negative for atypical moles, dry skin, pruritis, rashes, breast masses, and nipple discharge.      NEUROLOGICAL:  Negative for dizziness, headaches, paresthesias, and weakness.      PSYCHIATRIC:  Negative for anxiety, depression, and sleep disturbances.          PMH/FMH/SH:     Last Reviewed on 2018 03:24 PM by Mariel Lima    Past Medical History:         borderline Hypertension     Peptic Ulcer Disease: dx'd at age [age];     h/o freq UTI's     chronic knee pain R knee    back pain     diet controlled hyperglycemia     Obesity: morbid;     LE edema         GYNECOLOGICAL HISTORY:             PREVENTIVE HEALTH MAINTENANCE             COLORECTAL CANCER SCREENING: colonoscopy with normal results     MAMMOGRAM: Done within last 2 years and results in are chart was last done 10-10-17 with normal results         Surgical History:         Breast reduction-    Foot surgery      Lap Band-      L TKR 10/2017;         Family History:         Positive for Coronary Artery Disease ( father; mother -- h/o CABG X3 ), Hyperlipidemia ( father ) and Hypertension ( father; mother ).      Positive for Cancer- type not specified ( mat. aunt ).      Positive for sleep apnea in father.      Positive for Type 2 Diabetes ( mother; sister; mat. GF ).          Social History:     Occupation: [employer] in [place]     Marital Status:      Children: None         Tobacco/Alcohol/Supplements:     Last Reviewed on 2018 03:24 PM by Mariel Lima    Tobacco: She has a past history of cigarette smoking; quit date:  .          Substance Abuse History:     Last Reviewed on 2018 09:48 PM by Marquez Portillo        Mental Health History:     Last Reviewed on 2018 09:48 PM by Marquez Portillo        Communicable Diseases (eg STDs):     Last Reviewed on 2018 09:48 PM by Marquez Portillo            Immunizations:     Hep A, adult dose 2018     Varicella,  live 6/8/2009     Varicella, live 8/7/2009     zzFluzone pf-quadrivalent 3 and up 10/22/2015     zzFluzone pf-quadrivalent 3 and up 10/3/2017     Fluzone (3 + years dose) 1/9/2013     Fluzone pf (3+ years dose) 11/11/2014     Influenza Virus Vaccine pf (adult) 10/1/2016     Boostrix (Tdap) 8/10/2016         Allergies:     Last Reviewed on 8/21/2018 02:45 PM by Tona Mo    Sulfas:        Current Medications:     Last Reviewed on 8/21/2018 02:46 PM by Tona Mo April    Mupirocin 2% Topical Ointment Apply to affected area tid x 10 days     Trazodone HCl 150mg Tablet 1/2 - 1 TABLET QHS prn     Meloxicam     Vitamin B12     centrum multivitamin 1 tablet daily     Vitamin D3 1,000IU Capsules take 1 capsule daily     Melatonin         OBJECTIVE:        Vitals:         Current: 8/21/2018 2:45:58 PM    Ht:  5 ft, 3 in;  Wt: 206.4 lbs;  BMI: 36.6    T: 98 F (oral);  BP: 126/83 mm Hg (left arm, sitting);  P: 81 bpm (left arm (BP Cuff), sitting);  sCr: 0.53 mg/dL;  GFR: 137.74        Exams:     PHYSICAL EXAM:     GENERAL: vital signs recorded - well developed, well nourished;  no apparent distress;     EYES: extraocular movements intact; conjunctiva and cornea are normal; PERRLA;     E/N/T:  normal EACs, TMs, nasal/oral mucosa, teeth, gingiva, and oropharynx;     NECK: range of motion is normal; thyroid is non-palpable;     RESPIRATORY: normal respiratory rate and pattern with no distress; normal breath sounds with no rales, rhonchi, wheezes or rubs;     CARDIOVASCULAR: normal rate; rhythm is regular;  no systolic murmur; no edema;     GASTROINTESTINAL: nontender; normal bowel sounds; no organomegaly; rectal exam: normal tone; nontender, guaiac negative stool;     GENITOURINARY:  normal appearance of external genitalia, urethra, and cervix; no cervical motion tenderness; no adnexal tenderness or masses on bimanual exam;     BREAST/INTEGUMENT: BREASTS: breast exam is normal without masses, skin changes, or  nipple discharge; SKIN: no significant rashes or lesions; no suspicious moles;     MUSCULOSKELETAL:  Normal range of motion, strength and tone;     NEUROLOGICAL:  cranial nerves, motor and sensory function, reflexes, gait and coordination are all intact;     PSYCHIATRIC:  appropriate affect and demeanor; normal speech pattern; grossly normal memory;         Lab/Test Results:             Glucose, Urine:  Neg (08/21/2018),     Bilirubin, urine:  Small (08/21/2018),     Ketones, Urine Strip:  Negative (08/21/2018),     Specific Gravity, urine:  1.030 (08/21/2018),     Blood in Urine:  negative (08/21/2018),     pH, urine:  5.5 (08/21/2018),     Protein Urine QL:  negative (08/21/2018),     Urobilinogen, urine:  1.0 E.U./dL (08/21/2018),     Nitrite, Urine:  Negative (08/21/2018),     Leukoctyes, urine:  Negative (08/21/2018),     Appearance:  Clear (08/21/2018),     collection source:  Clean-catch (08/21/2018),     Color:  Yellow (08/21/2018),     Performed by::  marco antonio (08/21/2018),             ASSESSMENT:           V72.31     Well Woman Exam     V79.0   Z13.89  Screening for depression              DDx:     V76.12   Z12.31  Screening mammogram - other              DDx:     V76.49   Z12.11  Screening for colorectal cancer              DDx:     627.2   N95.1  Menopause              DDx:         ORDERS:         Radiology/Test Orders:       3014F  Screening mammography results documented and reviewed (PV)1  (In-House)         3017F  Colorectal CA screen results documented and reviewed (PV)  (In-House)         68402  Screening mammography bi 2-view inc CAD  (Send-Out)         02436  DXA, bone density study, 1 or more sites; axial skeleton (eg hips, pelvis, spine)  (Send-Out)           Lab Orders:       69442  Cytopathology, slides, cervical or vaginal; manual screening under MD supervision  (Send-Out)         32269  Urinalysis, automated, without microscopy  (In-House)         27045  Hemoccult  (In-House)           Other  Orders:         Depression screen negative  (In-House)         1101F  Pt screen for fall risk; document no falls in past year or only 1 fall w/o injury in past year (HEIDI)  (In-House)           Negative EtOH screen  (In-House)           Calculated BMI above the upper parameter and a follow-up plan was documented in the medical record  (In-House)                   PLAN:          Well Woman Exam healthy female, referral for mammo and dexa NO PAP PERFORMED           Orders:      50301  Cytopathology, slides, cervical or vaginal; manual screening under MD supervision  (Send-Out)         23267  Urinalysis, automated, without microscopy  (In-House)            Screening for depression     MIPS Negative Depression Screen Negative alcohol screen     MAMMOGRAM: Done within last 2 years and results in are chart     COLORECTAL CANCER SCREENING: Results are in chart     BMI Elevated - Follow-Up Plan: She was provided education on weight loss strategies           Orders:         Depression screen negative  (In-House)         1101F  Pt screen for fall risk; document no falls in past year or only 1 fall w/o injury in past year (HEIDI)  (In-House)           Negative EtOH screen  (In-House)         3014F  Screening mammography results documented and reviewed (PV)1  (In-House)         3017F  Colorectal CA screen results documented and reviewed (PV)  (In-House)           Calculated BMI above the upper parameter and a follow-up plan was documented in the medical record  (In-House)            Screening mammogram - other           Orders:       88910  Screening mammography bi 2-view inc CAD  (Send-Out)            Screening for colorectal cancer           Orders:       90048  Hemoccult  (In-House)            Menopause           Orders:       75871  DXA, bone density study, 1 or more sites; axial skeleton (eg hips, pelvis, spine)  (Send-Out)               CHARGE CAPTURE:           Primary Diagnosis:     V72.31     Well Woman Exam                Z01.419    Encounter for gynecological examination (general) (routine) without abnormal findings                       Orders:          52875   Preventive medicine, established patient, age 40-64 years  (In-House)             25860   Urinalysis, automated, without microscopy  (In-House)           V79.0 Screening for depression            Z13.89    Encounter for screening for other disorder              Orders:             Depression screen negative  (In-House)             1101F   Pt screen for fall risk; document no falls in past year or only 1 fall w/o injury in past year (HEIDI)  (In-House)                Negative EtOH screen  (In-House)             3014F   Screening mammography results documented and reviewed (PV)1  (In-House)             3017F   Colorectal CA screen results documented and reviewed (PV)  (In-House)                Calculated BMI above the upper parameter and a follow-up plan was documented in the medical record  (In-House)           V76.12 Screening mammogram - other            Z12.31    Encounter for screening mammogram for malignant neoplasm of breast    V76.49 Screening for colorectal cancer            Z12.11    Encounter for screening for malignant neoplasm of colon              Orders:          31922   Hemoccult  (In-House)           627.2 Menopause            N95.1    Menopausal and female climacteric states        ADDENDUMS:      ____________________________________    Date: 08/21/2018 03:51 PM    Author: Shraddha Currie         Radiology Orders Faxed to:        [facility]; Number [phone]            Addendum: 10/17/2018 04:17 PM - Stephanie Patterson         Radiology Orders Faxed to:        User Entered Recipient; Number [phone]

## 2021-05-18 NOTE — PROGRESS NOTES
Denia Wooten  1965     Office/Outpatient Visit    Visit Date: Tue, Apr 6, 2021 03:53 pm    Provider: Mariel Lima MD (Assistant: Oneyda Alan MA)    Location: [facility]        Electronically signed by Mariel Lima MD on  04/14/2021 11:08:54 AM                             Subjective:        CC: pt states she is not taking meloxicam     HPI:           Joanie presents with encounter for gynecological examination (general) (routine) without abnormal findings.  Her last physical exam was 1 year ago.  She is menopausal.  She is not currently using any form of contraception.  She performs breast self-exams occasionally.   Her last Pap smear was in 03- and was normal.   Her last mammogram was in 10-, was abnormal, and 2020?.   Her last DEXA was in 10- and was normal.   Preventative Health updated today.  Joanie denies any history of abnormal Pap smears.  Tobacco: She has a past history of cigarette smoking; quit date:  01/2000.            PHQ-9 Depression Screening: Completed form scanned and in chart; Total Score 0           With regard to the hyperlipidemia, unspecified, date of diagnosis 2011.  Current treatment includes a low cholesterol/low fat diet.  Compliance with treatment has been good; she takes her medication as directed and follows up as directed.  She denies experiencing any hypercholesterolemia related symptoms.  Most recent lab tests include Vitamin D, 25-Hydroxy:  30.6 (ng/mL) (03/14/2020), Hemoglobin:  12.80 (gm/dl) (05/12/2018), Hematocrit:  38.8 (%) (05/12/2018), Alkaline Phosphatase:  124 (U/L) (03/14/2020), ALT (SGPT):  57 (U/L) (03/14/2020), AST (SGOT):  35 (U/L) (03/14/2020), Creatinine, Serum:  0.60 (mg/dl) (03/14/2020), Glom Filt Rate, Est:  >60 (ml/min/1.73m2) (03/14/2020), Glucose, Serum:  109 (mg/dL) (03/14/2020), HDL:  61 (mg/dL) (03/14/2020), LDL:  126 (mg/dL) (03/14/2020), Total Cholesterol:  211 (mg/dL) (03/14/2020), Triglycerides:   122 (mg/dL) (2020), TSH:  1.810 (mIU/L) (2018).            In regard to the essential (primary) hypertension, current nonpharmacologic treatment includes low sodium diet.  Her current cardiac medication regimen includes off BB.  Compliance with treatment has been good; she takes her medication as directed and follows up as directed.  She is tolerating the medication well without side effects.  She has not kept a blood pressure diary, but states that pressures have been okay.      ROS:     CONSTITUTIONAL:  Negative for chills, fatigue, fever, and weight change.      EYES:  Negative for blurred vision, eye pain, and photophobia.      E/N/T:  Negative for ear pain, nasal congestion, frequent rhinorrhea and sore throat.      CARDIOVASCULAR:  Negative for chest pain, palpitations, tachycardia, orthopnea, and edema.      RESPIRATORY:  Negative for cough, dyspnea, and hemoptysis.      GASTROINTESTINAL:  Negative for abdominal pain, heartburn, constipation, diarrhea, and stool changes.      GENITOURINARY:  Negative for genital lesions, hematuria, menstrual problems, polyuria, abnormal vaginal bleeding, and vaginal discharge.      MUSCULOSKELETAL:  Negative for arthralgias, back pain, and myalgias.      INTEGUMENTARY/BREAST:  Negative for atypical moles, dry skin, pruritis, rashes, breast masses, and nipple discharge.      NEUROLOGICAL:  Negative for dizziness, headaches, paresthesias, and weakness.      PSYCHIATRIC:  Negative for anxiety, depression, and sleep disturbances.          Past Medical History / Family History / Social History:         Last Reviewed on 3/12/2020 03:39 PM by Mariel Lima    Past Medical History:         borderline Hypertension     Peptic Ulcer Disease: dx'd at age [age];     h/o freq UTI's     chronic knee pain R knee    back pain     diet controlled hyperglycemia     Obesity: morbid;     LE edema         GYNECOLOGICAL HISTORY:             PREVENTIVE HEALTH MAINTENANCE              BONE DENSITY: was last done 10-18-18 with normal results     COLORECTAL CANCER SCREENING: colonoscopy with normal results     MAMMOGRAM: Done within last 2 years and results in are chart was last done 10-19-18 with normal results         Surgical History:         Breast reduction-2006    Foot surgery 1999     Lap Band-2005     L TKR 10/2017;         Family History:         Positive for Coronary Artery Disease ( father; mother -- h/o CABG X3 ), Hyperlipidemia ( father ) and Hypertension ( father; mother ).      Positive for Cancer- type not specified ( mat. aunt ).      Positive for sleep apnea in father.      Positive for Type 2 Diabetes ( mother; sister; mat. GF ).          Social History:     Occupation: [employer] in [place]     Marital Status:      Children: None         Tobacco/Alcohol/Supplements:     Last Reviewed on 3/12/2020 03:19 PM by Claudine Walter    Tobacco: She has a past history of cigarette smoking; quit date:  .          Substance Abuse History:     Last Reviewed on 6/11/2018 09:48 PM by Marquez Portillo        Mental Health History:     Last Reviewed on 6/11/2018 09:48 PM by Marquez Portillo        Communicable Diseases (eg STDs):     Last Reviewed on 6/11/2018 09:48 PM by Marquez Portillo        Immunizations:     SARS-COV-2 (COVID-19) vaccine, UNSPECIFIED 3/24/2021    Hep A, adult dose 5/8/2018    Hep A, adult dose 11/19/2018    Varicella, live 6/8/2009    Varicella, live 8/7/2009    zzFluzone pf-quadrivalent 3 and up 10/22/2015    zzFluzone pf-quadrivalent 3 and up 10/3/2017    Fluzone (3 + years dose) 1/9/2013    Fluzone pf (3+ years dose) 11/11/2014    Fluzone Quadrivalent (3+ years) 10/8/2018    Fluzone Quadrivalent (3+ years) 10/15/2019    Influenza Virus Vaccine pf (adult) 10/1/2016    Boostrix (Tdap) 8/10/2016        Allergies:     Last Reviewed on 4/06/2021 03:58 PM by Oneyda Alan    Sulfas:          Current Medications:     Last Reviewed on 4/06/2021 03:58  PM by Oneyda Alan    traZODone 150 mg oral tablet [TAKE ONE-HALF TO ONE TABLET BY MOUTH EVERY NIGHT AT BEDTIME AS NEEDED]    Melatonin     Meloxicam     centrum multivitamin 1 tablet daily     ANASTROZOLE  TAB 1MG  [1 tab daily]        Objective:        Vitals:         Current: 4/6/2021 3:59:54 PM    Ht:  5 ft, 3 in;  Wt: 202.2 lbs;  BMI: 35.8T: 96.9 F (temporal);  BP: 129/70 mm Hg (left arm, sitting);  P: 67 bpm (left arm (BP Cuff), sitting);  sCr: 0.6 mg/dL;  GFR: 116.57        Exams:     PHYSICAL EXAM:     GENERAL: vital signs recorded - well developed, well nourished;  no apparent distress;     EYES: extraocular movements intact; conjunctiva and cornea are normal; PERRLA;     E/N/T:  normal EACs, TMs, nasal/oral mucosa, teeth, gingiva, and oropharynx;     NECK: range of motion is normal; thyroid is non-palpable;     RESPIRATORY: normal respiratory rate and pattern with no distress; normal breath sounds with no rales, rhonchi, wheezes or rubs;     CARDIOVASCULAR: normal rate; rhythm is regular;  no systolic murmur; no edema;     GASTROINTESTINAL: nontender; normal bowel sounds; no organomegaly; rectal exam: normal tone; nontender, guaiac negative stool;     GENITOURINARY:  normal appearance of external genitalia, urethra, and cervix; no cervical motion tenderness; no adnexal tenderness or masses on bimanual exam;     BREAST/INTEGUMENT: BREASTS: breast exam is normal without masses, skin changes, or nipple discharge; SKIN: no significant rashes or lesions; no suspicious moles; R breast-scar is healed, R breast has thickened scar tissue that is tender in the upper outer quadrant and R breast is smaller than L breast;     SKIN-inclusion cyst in R lower abd near groin 8 mm size-benign    MUSCULOSKELETAL:  Normal range of motion, strength and tone;     NEUROLOGICAL:  cranial nerves, motor and sensory function, reflexes, gait and coordination are all intact;     PSYCHIATRIC:  appropriate affect and demeanor; normal  speech pattern; grossly normal memory;         Lab/Test Results:         Glucose, Urine: Neg (04/06/2021),     Bilirubin, urine: Negative (04/06/2021),     Ketones, Urine Strip: Negative (04/06/2021),     Specific Gravity, urine: 1.030 (04/06/2021),     Blood in Urine: negative (04/06/2021),     pH, urine: 6.5 (04/06/2021),     Protein Urine QL: negative (04/06/2021),     Urobilinogen, urine: 2.0 E.U./dL (04/06/2021),     Nitrite, Urine: Negative (04/06/2021),     Leukoctyes, urine: Negative (04/06/2021),     Appearance: Clear (04/06/2021),     collection source: Clean-catch (04/06/2021),     Color: Yellow (04/06/2021),     Performed by:: al (04/06/2021),             Assessment:         Z01.419   Encounter for gynecological examination (general) (routine) without abnormal findings       Z13.31   Encounter for screening for depression       E78.5   Hyperlipidemia, unspecified       I10   Essential (primary) hypertension       E66.9   Obesity, unspecified       M15.0   Primary generalized (osteo)arthritis       E55.9   Vitamin D deficiency, unspecified       F51.01   Primary insomnia       Z78.0   Asymptomatic menopausal state           ORDERS:         Radiology/Test Orders:       3017F  Colorectal CA screen results documented and reviewed (PV)  (In-House)            25665  DXA, bone density study, 1 or more sites; axial skeleton (eg hips, pelvis, spine)  (Send-Out)              Lab Orders:       88582  Urinalysis, automated, without microscopy  (In-House)            07274  Cytopathology, slides, cervical or vaginal; manual screening under MD supervision  (Send-Out)            32923  VITD - [facility] Vitamin D, 25 Hydroxy  (Send-Out)            10627  HTNLP - [facility] CMP AND LIPID: 37998, 10670  (Send-Out)              Procedures Ordered:       51597  Handlg&/or convey of spec for TR office to lab  (In-House)              Other Orders:         Depression screen negative  (In-House)            1101F  Pt screen for fall  risk; document no falls in past year or only 1 fall w/o injury in past year (HEIDI)  (In-House)              Screening mammogram results documented  (Send-Out)                      Plan:         Encounter for gynecological examination (general) (routine) without abnormal findingsrecommend yearly flu and Td vaccinations, she has  one more covid shot to get, referal for mammogram, pap performed,           Orders:       33402  Urinalysis, automated, without microscopy  (In-House)            73982  Cytopathology, slides, cervical or vaginal; manual screening under MD supervision  (Send-Out)            95222  Handlg&/or convey of spec for TR office to lab  (In-House)              Encounter for screening for depression    MIPS PHQ-9 Depression Screening: Completed form scanned and in chart; Total Score 0; Negative Depression Screen           Orders:         Depression screen negative  (In-House)            1101F  Pt screen for fall risk; document no falls in past year or only 1 fall w/o injury in past year (HEIDI)  (In-House)              Screening mammogram results documented  (Send-Out)            3017F  Colorectal CA screen results documented and reviewed (PV)  (In-House)              Hyperlipidemia, unspecifiedwell controlled with diet and exercise        Essential (primary) hypertensionwell controlled with diet and exercise    LABORATORY:  Labs ordered to be performed today include HTN/Lipid Panel: CMP, Lipid.      RECOMMENDATIONS given include: avoidance of caffeine, avoidance of cigarette smoke, keep blood pressure log as directed, healthy carb, high healthy protein and high fiber diet, avoid salt in diet, f/u every 6 months for BP checks and labs, and exercise 30 min 3-4 days a week.            Orders:       75859  HTN - [facility] CMP AND LIPID: 12599, 49591  (Send-Out)              Obesity, unspecifiedshe has lost 20 #s on the keto diet, doing great        Primary generalized (osteo)arthritisno acute  issues        Vitamin D deficiency, unspecifiedon vitamin D in her MVI    LABORATORY:  Labs ordered to be performed today include Vitamin D.            Orders:       46283  VITD - [facility] Vitamin D, 25 Hydroxy  (Send-Out)              Primary insomniastable on trazodone        Asymptomatic menopausal state          Orders:       34018  DXA, bone density study, 1 or more sites; axial skeleton (eg hips, pelvis, spine)  (Send-Out)                  Patient Recommendations:        For  Essential (primary) hypertension:    Try to avoid or reduce the amount of caffeine intake. Avoid cigarette smoke. Keep a daily blood pressure log and report elevated blood pressure to provider as directed. Drink plenty of fluids.  Fever increases the loss of fluids and can lead to dehydration.              Charge Capture:         Primary Diagnosis:     Z01.419  Encounter for gynecological examination (general) (routine) without abnormal findings           Orders:      96686  Preventive medicine, established patient, age 40-64 years  (In-House)            11818  Urinalysis, automated, without microscopy  (In-House)            91148  Handlg&/or convey of spec for TR office to lab  (In-House)              Z13.31  Encounter for screening for depression           Orders:      09785-47  Office/outpatient visit; established patient, level 4  (In-House)              Depression screen negative  (In-House)            1101F  Pt screen for fall risk; document no falls in past year or only 1 fall w/o injury in past year (HEIDI)  (In-House)            3017F  Colorectal CA screen results documented and reviewed (PV)  (In-House)              E78.5  Hyperlipidemia, unspecified     I10  Essential (primary) hypertension     E66.9  Obesity, unspecified     M15.0  Primary generalized (osteo)arthritis     E55.9  Vitamin D deficiency, unspecified     F51.01  Primary insomnia     Z78.0  Asymptomatic menopausal state

## 2021-05-18 NOTE — PROGRESS NOTES
Denia Wooten  1965     Office/Outpatient Visit    Visit Date: Wed, Nov 13, 2019 03:54 pm    Provider: Mariel Lima MD (Assistant: Claudine Walter MA)    Location: [facility]        Electronically signed by Mariel Lima MD on  11/15/2019 01:29:26 PM                             Subjective:        CC: Joanie is a 54 year old White female.  She presents with results of bx.          HPI:           PHQ-9 Depression Screening: Completed form scanned and in chart; Total Score 6       Joanie is in today to go over her breast cancer results-she had a R breast bx of a 7 mm central mass and pathology showed a invasive ductal carcinoma Estrogen and progesterone positive, HER equivicol.  Pt was educated on dx and she has a f/u appt at [facility] breast team for further eval and to set up lumpectomy and treatment plan.  She is very tearful over the diagnosis.  Her  is with her.  She defers anxiety meds at this time.    ROS:     CONSTITUTIONAL:  Negative for chills, fatigue, fever, and weight change.      E/N/T:  Negative for ear pain, nasal congestion, frequent rhinorrhea and sore throat.      CARDIOVASCULAR:  Negative for chest pain, palpitations, tachycardia, orthopnea, and edema.      RESPIRATORY:  Negative for cough, dyspnea, and hemoptysis.      GASTROINTESTINAL:  Negative for abdominal pain, heartburn, constipation, diarrhea, and stool changes.      NEUROLOGICAL:  Negative for dizziness, headaches, paresthesias, and weakness.      PSYCHIATRIC:  Negative for anxiety, depression, and sleep disturbances.          Past Medical History / Family History / Social History:         Last Reviewed on 11/13/2019 04:24 PM by Mariel Lima    Past Medical History:         borderline Hypertension     Peptic Ulcer Disease: dx'd at age [age];     h/o freq UTI's     chronic knee pain R knee    back pain     diet controlled hyperglycemia     Obesity: morbid;     LE edema         GYNECOLOGICAL HISTORY:              PREVENTIVE HEALTH MAINTENANCE             BONE DENSITY: was last done 10-18-18 with normal results     COLORECTAL CANCER SCREENING: colonoscopy with normal results     MAMMOGRAM: Done within last 2 years and results in are chart was last done 10-19-18 with normal results         Surgical History:         Breast reduction-    Foot surgery      Lap Band-     L TKR 10/2017;         Family History:         Positive for Coronary Artery Disease ( father; mother -- h/o CABG X3 ), Hyperlipidemia ( father ) and Hypertension ( father; mother ).      Positive for Cancer- type not specified ( mat. aunt ).      Positive for sleep apnea in father.      Positive for Type 2 Diabetes ( mother; sister; mat. GF ).          Social History:     Occupation: [employer] in [place]     Marital Status:      Children: None         Tobacco/Alcohol/Supplements:     Last Reviewed on 2019 04:24 PM by Mariel Lima    Tobacco: She has a past history of cigarette smoking; quit date:  .          Substance Abuse History:     Last Reviewed on 2018 09:48 PM by Marquez Portillo        Mental Health History:     Last Reviewed on 2018 09:48 PM by Marquez Portillo        Communicable Diseases (eg STDs):     Last Reviewed on 2018 09:48 PM by Marquez Portillo        Allergies:     Last Reviewed on 2018 02:46 PM by Tona Mo    Sulfas:          Current Medications:     Last Reviewed on 2018 02:46 PM by Tona Mo    Trazodone HCl 150mg Tablet [1/2 - 1 TABLET QHS prn]    Melatonin     Meloxicam     centrum multivitamin 1 tablet daily    Vitamin D3 1,000IU Capsules [take 1 capsule daily]    Vitamin B12     Mupirocin 2% Topical Ointment [Apply to affected area tid x 10 days]        Objective:        Vitals:         Current: 2019 4:02:01 PM    Ht:  5 ft, 3 in;  Wt: 222.8 lbs;  BMI: 39.5T: 97.8 F (oral);  BP: 132/72 mm Hg (right arm, sitting);  P: 70  bpm (right arm (BP Cuff), sitting);  sCr: 0.53 mg/dL;  GFR: 139.11        Exams:     PHYSICAL EXAM:     GENERAL:  well developed and nourished; appropriately groomed; in no apparent distress;     RESPIRATORY: normal respiratory rate and pattern with no distress;     PSYCHIATRIC: affect/demeanor: tearful;  normal psychomotor function; normal speech pattern; normal thought and perception;         Assessment:         Z13.31   Encounter for screening for depression       C50.111   Malignant neoplasm of central portion of right female breast       F43.22   Adjustment disorder with anxiety           ORDERS:         Radiology/Test Orders:       3014F  Screening mammography results documented and reviewed (PV)1  (In-House)              Other Orders:         Depression screen negative  (In-House)            1101F  Pt screen for fall risk; document no falls in past year or only 1 fall w/o injury in past year (HEIDI)  (In-House)                      Plan:         Encounter for screening for depression    MIPS PHQ-9 Depression Screening: Completed form scanned and in chart; Total Score 6; Positive Depression Screen but after further evaluation the patient does not have a diagnosis of depression.            Orders:         Depression screen negative  (In-House)            1101F  Pt screen for fall risk; document no falls in past year or only 1 fall w/o injury in past year (HEDII)  (In-House)            3014F  Screening mammography results documented and reviewed (PV)1  (In-House)              Malignant neoplasm of central portion of right female breastJoanie is in today to go over her breast cancer results-she had a R breast bx of a 7 mm central mass and pathology showed a invasive ductal carcinoma Estrogen and progesterone positive, HER equivicol.  Pt was educated on dx and she has a f/u appt at [facility] breast team for further eval and to set up lumpectomy and treatment plan.  She is very tearful over the diagnosis.  Her   is with her.  She defers anxiety meds at this time.            Charge Capture:         Primary Diagnosis:     Z13.31  Encounter for screening for depression           Orders:      19509  Office/outpatient visit; established patient, level 3  (In-House)              Depression screen negative  (In-House)            1101F  Pt screen for fall risk; document no falls in past year or only 1 fall w/o injury in past year (HEIDI)  (In-House)            3014F  Screening mammography results documented and reviewed (PV)1  (In-House)              C50.111  Malignant neoplasm of central portion of right female breast     F43.22  Adjustment disorder with anxiety         ADDENDUMS:      ____________________________________    Addendum: 11/14/2019 09:21 AM - One, Team         Visit Note Faxed to:        User Entered Recipient; Number [phone]

## 2021-05-18 NOTE — PROGRESS NOTES
Denia Wooten 1965     Office/Outpatient Visit    Visit Date:  05:47 pm    Provider: Marquez Portillo N.P. (Assistant: Shyann Walsh MA)    Location: [facility]        Electronically signed by Marquez Portillo N.P. on  2018 09:50:08 PM                             SUBJECTIVE:        CC:     Joanie is a 52 year old White female.  presents today due to TICK BITE ON LEFT HIP, LEFT HAND PAIN SHOOTING INTO FINGERS         HPI:         Patient complains of hand pain.  Today's visit is for evaluation of left hand pain.  Discomfort is located last 2 fingers and up towards elbow.  The pain radiates to the mid left arm but not all the way up to the elbow.  She describes it as constant, severe, and bruising painn.  The initial onset of discomfort was 2 weeks ago.  She has not found anything that relieves the pain.  It is aggravated by wrist flexion, wrist extension, and weight bearing on wrist.  Other details: Saw Dr Rufino colón and he did a xray of her wirst and said she has a cyst in there that could be causing the pain.      ROS:     CONSTITUTIONAL:  Negative for chills, fatigue, fever, and weight change.      CARDIOVASCULAR:  Negative for chest pain, orthopnea, paroxysmal nocturnal dyspnea and pedal edema.      RESPIRATORY:  Negative for dyspnea.      GASTROINTESTINAL:  Negative for abdominal pain, constipation, diarrhea, nausea and vomiting.      MUSCULOSKELETAL:  Positive for left wrist pain.      INTEGUMENTARY/BREAST:  Positive for Rash left hip x 2-3 days, does not itch or hurt, has been using 2014  Mupricion cream on it that she had left over at home but has not cleared up.          PMH/FMH/SH:     Last Reviewed on 2018 09:48 PM by Marquez Portillo    Past Medical History:         borderline Hypertension     Peptic Ulcer Disease: dx'd at age [age];     h/o freq UTI's     chronic knee pain R knee    back pain     diet controlled hyperglycemia      Obesity: morbid;     LE edema         GYNECOLOGICAL HISTORY:             PREVENTIVE HEALTH MAINTENANCE             COLORECTAL CANCER SCREENING: colonoscopy with normal results     MAMMOGRAM: Done within last 2 years and results in are chart was last done 10-10-17 with normal results         Surgical History:         Breast reduction-    Foot surgery      Lap Band-      L TKR 10/2017;         Family History:         Positive for Coronary Artery Disease ( father; mother -- h/o CABG X3 ), Hyperlipidemia ( father ) and Hypertension ( father; mother ).      Positive for Cancer- type not specified ( mat. aunt ).      Positive for sleep apnea in father.      Positive for Type 2 Diabetes ( mother; sister; mat. GF ).          Social History:     Occupation: [employer] in [place]     Marital Status:      Children: None         Tobacco/Alcohol/Supplements:     Last Reviewed on 2018 09:48 PM by Marquez Portillo    Tobacco: She has a past history of cigarette smoking; quit date:  .              Current Problems:     Last Reviewed on 2018 09:48 PM by Marquez Portillo    Constipation     Artificial joint replacement, Knee     Vertigo     Overweight     Osteoarthritis of knee     Chronic insomnia     Fatigue     Vitamin D deficiency, unspecified     Obesity, NOS     Weight loss program, follow-up     Generalized osteoarthritis     Use of high risk medications     Overweight and obesity     Hypertension     Metabolic syndrome     Glucose intolerance     Lower back pain     Ovarian cyst     Kidney stone     Essential hypercholesterolemia     History of hematuria     Wrist tendonitis     Skin infection     Cyst of ganglion, NEC         Immunizations:     Hep A, adult dose 2018     Varicella, live 2009     Varicella, live 2009     zzFluzone pf-quadrivalent 3 and up 10/22/2015     zzFluzone pf-quadrivalent 3 and up 10/3/2017     Fluzone (3 + years dose) 2013     Fluzone pf  (3+ years dose) 11/11/2014     Influenza Virus Vaccine pf (adult) 10/1/2016     Boostrix (Tdap) 8/10/2016         Allergies:     Last Reviewed on 6/11/2018 09:48 PM by Marquez Portillo    Sulfas:        Current Medications:     Last Reviewed on 6/11/2018 09:48 PM by Marquez Portillo    Trazodone HCl 150mg Tablet 1/2 - 1 TABLET QHS prn     Meloxicam     Hydrocodone/Acetaminophen 5mg/325mg Tablet 1 every 6 hours prn pain     Vitamin B12     centrum multivitamin 1 tablet daily     Vitamin D3 1,000IU Capsules take 1 capsule daily     Melatonin     Tylenol 8 Hr Arthritis Pain         OBJECTIVE:        Vitals:         Current: 6/11/2018 5:50:05 PM    Ht:  5 ft, 3 in;  Wt: 205.6 lbs;  BMI: 36.4    T: 98.6 F (oral);  BP: 143/75 mm Hg (left arm, sitting);  P: 70 bpm (left arm (BP Cuff), sitting);  sCr: 0.53 mg/dL;  GFR: 137.51        Exams:     PHYSICAL EXAM:     GENERAL: vital signs recorded - well developed, well nourished;  no apparent distress;     RESPIRATORY: normal respiratory rate and pattern with no distress; normal breath sounds with no rales, rhonchi, wheezes or rubs;     CARDIOVASCULAR: normal rate; rhythm is regular;  no systolic murmur; no edema;     BREAST/INTEGUMENT: small silver dollar size area left hip vesicular pustular erythemic area without tenderness;     MUSCULOSKELETAL: left radial wrist tenderness , no edema, FROM, Brisk cap refill to left hand,         ASSESSMENT:           726.4   M70.12  Wrist tendonitis              DDx:     686.9   L08.9  Skin infection              DDx:         ORDERS:         Meds Prescribed:       Mupirocin 2% Topical Ointment Apply to affected area tid x 10 days  #30 (Thirty) gm Refills: 1       Ibuprofen 800mg Tablet Take 1 tablet(s) by mouth q8h prn  #60 (Sixty) tablet(s) Refills: 0         Lab Orders:       [id number]  [facility] Wound Culture  (Send-Out)  (left hip )                 PLAN:          Wrist tendonitis         RECOMMENDATIONS given include: elevation of the  foot as much as possible, heat therapy, and Keep apt you already have sccheduled [facility]  if he seen a cyst on xray , then he may need to take it out ..      FOLLOW-UP: Schedule follow-up appointments on a p.r.n. basis..            Prescriptions:       Ibuprofen 800mg Tablet Take 1 tablet(s) by mouth q8h prn  #60 (Sixty) tablet(s) Refills: 0          Skin infection     LABORATORY:  Labs ordered to be performed today include wound culture.      RECOMMENDATIONS given include: Further recommendation to be given after test results are complete.            Prescriptions:       Mupirocin 2% Topical Ointment Apply to affected area tid x 10 days  #30 (Thirty) gm Refills: 1           Orders:       [id number]  [facility] Wound Culture  (Send-Out)  (left hip )             Patient Recommendations:        For  Wrist tendonitis:     Keep the foot elevated as much as possible. Apply heat to the affected area.  Schedule follow-up appointments as needed.                APPOINTMENT INFORMATION:        Monday Tuesday Wednesday Thursday Friday Saturday Sunday            Time:___________________AM  PM   Date:_____________________             CHARGE CAPTURE:           Primary Diagnosis:     726.4 Wrist tendonitis            M70.12    Bursitis, left hand              Orders:          [id number]   Office/outpatient visit; established patient, level 4  (In-House)           686.9 Skin infection            L08.9    Local infection of the skin and subcutaneous tissue, unspecified

## 2021-05-18 NOTE — PROGRESS NOTES
Denia Wooten  1965     Office/Outpatient Visit    Visit Date: Thu, Mar 12, 2020 03:13 pm    Provider: Mariel Lima MD (Assistant: Claudine Walter MA)    Location: [facility]        Electronically signed by Mariel Lima MD on  03/20/2020 01:12:19 PM                             Subjective:        CC: Joanie is a 54 year old White female.  well woman;         HPI:           Her last Pap smear was in 8/4/17 and was normal.  Her last mammogram was in 10/29/19 and was abnormal; follow-up radiation.  DEXA scan over a year ago.  She is not currently using any form of contraception.   Her last physical exam was 1 year ago.  She is menopausal.  She is not currently using any form of contraception.  She performs breast self-exams occasionally.   She underwent colonoscopy 2 years ago with normal results.   She's had vision screening done 2019   [facility] updated today.  She is current with her Td and influenza immunization.  Joanie denies any history of abnormal Pap smears.  Tobacco: She has a past history of cigarette smoking; quit date:  2001.                Concerning hyperlipidemia, unspecified, date of diagnosis 2011.  Current treatment includes a low cholesterol/low fat diet.  Compliance with treatment has been good; she takes her medication as directed and follows up as directed.  She denies experiencing any hypercholesterolemia related symptoms.  Most recent lab tests include Vitamin D, 25-Hydroxy:  37.4 (ng/mL) (05/12/2018), Hemoglobin:  12.80 (gm/dl) (05/12/2018), Hematocrit:  38.8 (%) (05/12/2018), Alkaline Phosphatase:  102 (U/L) (05/12/2018), ALT (SGPT):  21 (U/L) (05/12/2018), AST (SGOT):  23 (U/L) (05/12/2018), Creatinine, Serum:  0.53 (mg/dl) (05/12/2018), Glom Filt Rate, Est:  >60 (ml/min/1.73m2) (05/12/2018), HDL:  55 (mg/dL) (05/12/2018), LDL:  99 (mg/dL) (05/12/2018), Total Cholesterol:  172 (mg/dL) (05/12/2018), Triglycerides:  91 (mg/dL) (05/12/2018).             Additionally, she presents with history of essential (primary) hypertension.  current nonpharmacologic treatment includes low sodium diet.  Her current cardiac medication regimen includes off BB.  Compliance with treatment has been good; she takes her medication as directed and follows up as directed.  She is tolerating the medication well without side effects.  She has not kept a blood pressure diary, but states that pressures have been okay.      ROS:     CONSTITUTIONAL:  Negative for chills, fatigue, fever, and weight change.      EYES:  Negative for blurred vision, eye pain, and photophobia.      E/N/T:  Negative for ear pain, nasal congestion, frequent rhinorrhea and sore throat.      CARDIOVASCULAR:  Negative for chest pain, palpitations, tachycardia, orthopnea, and edema.      RESPIRATORY:  Negative for cough, dyspnea, and hemoptysis.      GASTROINTESTINAL:  Negative for abdominal pain, heartburn, constipation, diarrhea, and stool changes.      GENITOURINARY:  Negative for genital lesions, hematuria, menstrual problems, polyuria, abnormal vaginal bleeding, and vaginal discharge.      MUSCULOSKELETAL:  Negative for arthralgias, back pain, and myalgias.      INTEGUMENTARY/BREAST:  Negative for atypical moles, dry skin, pruritis, rashes, breast masses, and nipple discharge.      NEUROLOGICAL:  Negative for dizziness, headaches, paresthesias, and weakness.      PSYCHIATRIC:  Negative for anxiety, depression, and sleep disturbances.          Past Medical History / Family History / Social History:         Last Reviewed on 3/12/2020 03:39 PM by Mariel Lima    Past Medical History:         borderline Hypertension     Peptic Ulcer Disease: dx'd at age [age];     h/o freq UTI's     chronic knee pain R knee    back pain     diet controlled hyperglycemia     Obesity: morbid;     LE edema         GYNECOLOGICAL HISTORY:             PREVENTIVE HEALTH MAINTENANCE             BONE DENSITY: was last done 10-18-18  with normal results     COLORECTAL CANCER SCREENING: colonoscopy with normal results     MAMMOGRAM: Done within last 2 years and results in are chart was last done 10-19-18 with normal results         Surgical History:         Breast reduction-2006    Foot surgery 1999     Lap Band-2005     L TKR 10/2017;         Family History:         Positive for Coronary Artery Disease ( father; mother -- h/o CABG X3 ), Hyperlipidemia ( father ) and Hypertension ( father; mother ).      Positive for Cancer- type not specified ( mat. aunt ).      Positive for sleep apnea in father.      Positive for Type 2 Diabetes ( mother; sister; mat. GF ).          Social History:     Occupation: [employer] in [place]     Marital Status:      Children: None         Tobacco/Alcohol/Supplements:     Last Reviewed on 3/12/2020 03:19 PM by Claudine Walter    Tobacco: She has a past history of cigarette smoking; quit date:  .          Substance Abuse History:     Last Reviewed on 6/11/2018 09:48 PM by Marquez Portillo        Mental Health History:     Last Reviewed on 6/11/2018 09:48 PM by Marquez Portillo        Communicable Diseases (eg STDs):     Last Reviewed on 6/11/2018 09:48 PM by Marquez Portillo        Immunizations:     Hep A, adult dose 5/8/2018    Hep A, adult dose 11/19/2018    Varicella, live 6/8/2009    Varicella, live 8/7/2009    zzFluzone pf-quadrivalent 3 and up 10/22/2015    zzFluzone pf-quadrivalent 3 and up 10/3/2017    Fluzone (3 + years dose) 1/9/2013    Fluzone pf (3+ years dose) 11/11/2014    Fluzone Quadrivalent (3+ years) 10/8/2018    Fluzone Quadrivalent (3+ years) 10/15/2019    Influenza Virus Vaccine pf (adult) 10/1/2016    Boostrix (Tdap) 8/10/2016        Allergies:     Last Reviewed on 11/13/2019 03:56 PM by Claudine Walter    Sulfas:          Current Medications:     Last Reviewed on 11/13/2019 03:57 PM by Claudine Walter    Trazodone HCl 150mg Tablet [1/2 - 1 TABLET QHS prn]    Melatonin  "    Meloxicam     centrum multivitamin 1 tablet daily    Vitamin D3 1,000IU Capsules [take 1 capsule daily]    Vitamin B12         Objective:        Vitals:         Current: 3/12/2020 3:22:57 PM    Ht:  5 ft, 3 in;  Wt: 222.8 lbs;  BMI: 39.5T: 97.8 F (oral);  BP: 138/67 mm Hg (right arm, sitting);  P: 79 bpm (right arm (BP Cuff), sitting);  sCr: 0.53 mg/dL;  GFR: 139.11        Exams:     PHYSICAL EXAM:     GENERAL: vital signs recorded - well developed, well nourished;  no apparent distress;     EYES: extraocular movements intact; conjunctiva and cornea are normal; PERRLA;     E/N/T:  normal EACs, TMs, nasal/oral mucosa, teeth, gingiva, and oropharynx;     NECK: range of motion is normal; thyroid is non-palpable;     RESPIRATORY: normal respiratory rate and pattern with no distress; normal breath sounds with no rales, rhonchi, wheezes or rubs;     CARDIOVASCULAR: normal rate; rhythm is regular;  no systolic murmur; no edema;     GASTROINTESTINAL: nontender; normal bowel sounds; no organomegaly; rectal exam: normal tone; nontender, guaiac negative stool;     GENITOURINARY:  normal appearance of external genitalia, urethra, and cervix; no cervical motion tenderness; no adnexal tenderness or masses on bimanual exam;     BREAST/INTEGUMENT: BREASTS: breast exam is normal without masses, skin changes, or nipple discharge; SKIN: no significant rashes or lesions; no suspicious moles; R breast-scar is healed, R breast is \"tan\" from radiation and smaller than L breast;     MUSCULOSKELETAL:  Normal range of motion, strength and tone;     NEUROLOGICAL:  cranial nerves, motor and sensory function, reflexes, gait and coordination are all intact;     PSYCHIATRIC:  appropriate affect and demeanor; normal speech pattern; grossly normal memory;         Lab/Test Results:         Glucose, Urine: 500 mg/dL (03/12/2020),     Bilirubin, urine: Negative (03/12/2020),     Ketones, Urine Strip: Negative (03/12/2020),     Specific Gravity, " urine: greater than 1.030 (03/12/2020),     Blood in Urine: negative (03/12/2020),     pH, urine: 6.0 (03/12/2020),     Protein Urine QL: negative (03/12/2020),     Urobilinogen, urine: 2.0 E.U./dL (03/12/2020),     Nitrite, Urine: Negative (03/12/2020),     Leukoctyes, urine: Negative (03/12/2020),     Appearance: Clear (03/12/2020),     collection source: Clean-catch (03/12/2020),     Color: Yellow (03/12/2020),     Performed by:: mnp (03/12/2020),             Assessment:         Z01.419   Encounter for gynecological examination (general) (routine) without abnormal findings       E78.5   Hyperlipidemia, unspecified       I10   Essential (primary) hypertension       E66.9   Obesity, unspecified       M15.0   Primary generalized (osteo)arthritis       E55.9   Vitamin D deficiency, unspecified       R43.8   Other disturbances of smell and taste       Z12.11   Encounter for screening for malignant neoplasm of colon       Z12.31   Encounter for screening mammogram for malignant neoplasm of breast       Z78.0   Asymptomatic menopausal state           ORDERS:         Lab Orders:       [id number]  Cytopathology, slides, cervical or vaginal; manual screening under MD supervision  (Send-Out)            01191  HTNLP - [facility] CMP AND LIPID: [id number], [id number]  (Send-Out)            72688  VITD - [facility] Vitamin D, 25 Hydroxy  (Send-Out)            42557  Blood occult, peroxidase act, qual; feces, colorectal screening  (In-House)              Procedures Ordered:       [id number]  Handlg&/or convey of spec for TR office to lab  (In-House)                      Plan:         Encounter for gynecological examination (general) (routine) without abnormal findingshealthy, pap performed          Orders:       11215  Cytopathology, slides, cervical or vaginal; manual screening under MD supervision  (Send-Out)            15020  Handlg&/or convey of spec for TR office to lab  (In-House)              Hyperlipidemia, unspecified    LABORATORY:  Labs ordered to be  performed today include HTN/Lipid Panel: CMP, Lipid.            Orders:       36920  HTNLP - H CMP AND LIPID: [id number], [id number]  (Send-Out)              Essential (primary) hypertensionwell controlled, not on medication        Obesity, unspecifiedcontinue to work on diet and exercise        Primary generalized (osteo)arthritisstable on meloxicam prn        RECOMMENDATIONS given include: try NSAID, patient  is told to  watch for upset stomach, PUD/GI bleeding and aware of increased risk of CAD.          Vitamin D deficiency, unspecifiedtakes vitamin D in viactive 800 units daily    LABORATORY:  Labs ordered to be performed today include Vitamin D.            Orders:       43560  VITD - [facility] Vitamin D, 25 Hydroxy  (Send-Out)              Other disturbances of smell and tastedue to anastrazole        Encounter for screening for malignant neoplasm of colon          Orders:       51407  Blood occult, peroxidase act, qual; feces, colorectal screening  (In-House)              Encounter for screening mammogram for malignant neoplasm of breasth/o breast cancer R breast and she sees oncology, post radiation and on anastrazole, she is to get her f/u mammo next week        Asymptomatic menopausal statedexa due oct 2020            Charge Capture:         Primary Diagnosis:     Z01.419  Encounter for gynecological examination (general) (routine) without abnormal findings           Orders:      01337  Preventive medicine, established patient, age 40-64 years  (In-House)            63075  Handlg&/or convey of spec for TR office to lab  (In-House)              E78.5  Hyperlipidemia, unspecified           Orders:      12738-87  Office/outpatient visit; established patient, level 4  (In-House)              I10  Essential (primary) hypertension     E66.9  Obesity, unspecified     M15.0  Primary generalized (osteo)arthritis     E55.9  Vitamin D deficiency, unspecified     R43.8  Other disturbances of smell and taste     Z12.11  Encounter  for screening for malignant neoplasm of colon           Orders:      [id number]  Blood occult, peroxidase act, qual; feces, colorectal screening  (In-House)              Z12.31  Encounter for screening mammogram for malignant neoplasm of breast     Z78.0  Asymptomatic menopausal state

## 2021-05-19 ENCOUNTER — OFFICE VISIT (OUTPATIENT)
Dept: ORTHOPEDIC SURGERY | Facility: CLINIC | Age: 56
End: 2021-05-19

## 2021-05-19 VITALS — WEIGHT: 204 LBS | BODY MASS INDEX: 36.14 KG/M2 | HEIGHT: 63 IN | TEMPERATURE: 97.3 F

## 2021-05-19 DIAGNOSIS — S82.839A AVULSION FRACTURE OF DISTAL END OF FIBULA: Primary | ICD-10-CM

## 2021-05-19 PROCEDURE — 27786 TREATMENT OF ANKLE FRACTURE: CPT | Performed by: PHYSICIAN ASSISTANT

## 2021-05-19 PROCEDURE — 99214 OFFICE O/P EST MOD 30 MIN: CPT | Performed by: PHYSICIAN ASSISTANT

## 2021-05-19 NOTE — PROGRESS NOTES
"Chief Complaint  Pain and Establish Care of the Left Ankle    Subjective    History of Present Illness      Denia Wooten is a 55 y.o. female who presents to [facility] for new complaint of left lateral ankle pain for approximately 2 weeks.  She states she stepped in a hole and twisted the ankle.  Date of injury was 5/7/2021.  Her pain is rated as moderate and constant described as a burning sensation.  She has had associated bruising and swelling.  Symptoms are worse with standing and walking.  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  She is using anti-inflammatory medication and rest which has seemed to improve her symptoms.        Objective   Vital Signs:   Temp 97.3 °F (36.3 °C)   Ht 160 cm (63\")   Wt 92.5 kg (204 lb)   BMI 36.14 kg/m²     Physical Exam  Vitals signs and nursing note reviewed.   Constitutional:       Appearance: Normal appearance.   Pulmonary:      Effort: Pulmonary effort is normal.   Skin:     General: Skin is warm and dry.      Capillary Refill: Capillary refill takes less than 2 seconds.   Neurological:      General: No focal deficit present.      Mental Status: He is alert and oriented to person, place, and time. Mental status is at baseline.   Psychiatric:         Mood and Affect: Mood normal.         Behavior: Behavior normal.         Thought Content: Thought content normal.         Judgment: Judgment normal.     Ortho Exam   LEFT ankle  Positive for diffuse ankle swelling and a mild amount of pedal edema on the distal tibia.    Positive for tenderness of the distal fibula, below the level of the syndesmosis.  The syndesmosis itself is stable.   Positive for mild restricted dorsiflexion and plantarflexion.   External rotation and squeeze tests are negative.   Calf is soft and nontender.  Dorsalis pedis artery is palpable. Common peroneal nerve function is well preserved.  No evidence of clinical deformity or clinical signs of " instability.    Negative for proximal fibular tenderness.        Result Review :   Radiologic studies - see below for interpretation  Left ankle xrays 3views were performed at [facility] on 5/18/21. These images were independently viewed and interpreted by myself, my impression as follows:  Nondisplaced avulsion fracture of the left distal fibula.  Soft tissue swelling noted on x-ray.        Assessment   Assessment and Plan    Problem List Items Addressed This Visit     None      Visit Diagnoses     Avulsion fracture of distal end of fibula    -  Primary    Relevant Orders    Cane          Follow Up   · Discussion of any imaging in detail. Discussion of orthopaedic goals.  · Risk, benefits, and merits of treatment alternatives reviewed with the patient. Treatment alternatives include: oral anti-inflammatories/NSAID and bracing.  Discussed casting versus a cam walking boot.  Ultimately we decided a cam walking boot would be sufficient and patient would prefer to go that route.  She will be given a prescription for a cane to assist with ambulation.  · Ice, heat, and/or modalities as beneficial  · Cast, splint or brace care and assistive device usage instructions given  · Weight bearing parameters reviewed  · Patient is encouraged to call or return for any issues or concerns.  · No brace was given at today's visit. Tall leg ankle immobilizer/cam walking boot  · Follow up in 3 weeks due to upcoming vacation  • Patient was given instructions and counseling regarding her condition or for health maintenance advice. Please see specific information pulled into the AVS if appropriate.     Garry Coronado PA-C   Date of Encounter: 5/19/2021   Electronically signed by Garry Coronado PA-C, 05/21/21, 1:05 PM EDT.      EMR Dragon/Transcription disclaimer:  Much of this encounter note is an electronic transcription/translation of spoken language to printed text. The electronic translation of spoken  language may permit erroneous, or at times, nonsensical words or phrases to be inadvertently transcribed; Although I have reviewed the note for such errors, some may still exist.

## 2021-05-21 PROBLEM — S82.839A AVULSION FRACTURE OF DISTAL END OF FIBULA: Status: ACTIVE | Noted: 2021-05-21

## 2021-05-28 VITALS
HEART RATE: 63 BPM | TEMPERATURE: 97.6 F | TEMPERATURE: 98 F | TEMPERATURE: 98.8 F | RESPIRATION RATE: 16 BRPM | OXYGEN SATURATION: 98 % | SYSTOLIC BLOOD PRESSURE: 139 MMHG | DIASTOLIC BLOOD PRESSURE: 79 MMHG | DIASTOLIC BLOOD PRESSURE: 66 MMHG | RESPIRATION RATE: 18 BRPM | DIASTOLIC BLOOD PRESSURE: 61 MMHG | RESPIRATION RATE: 16 BRPM | WEIGHT: 228.4 LBS | BODY MASS INDEX: 39.52 KG/M2 | RESPIRATION RATE: 18 BRPM | BODY MASS INDEX: 38.7 KG/M2 | WEIGHT: 223.11 LBS | HEIGHT: 63 IN | OXYGEN SATURATION: 99 % | SYSTOLIC BLOOD PRESSURE: 142 MMHG | WEIGHT: 221.34 LBS | WEIGHT: 218.48 LBS | BODY MASS INDEX: 40.46 KG/M2 | BODY MASS INDEX: 39.22 KG/M2 | TEMPERATURE: 97.7 F | HEART RATE: 70 BPM | DIASTOLIC BLOOD PRESSURE: 78 MMHG | SYSTOLIC BLOOD PRESSURE: 115 MMHG | SYSTOLIC BLOOD PRESSURE: 145 MMHG | OXYGEN SATURATION: 99 % | HEART RATE: 68 BPM | OXYGEN SATURATION: 97 % | HEART RATE: 65 BPM

## 2021-05-28 NOTE — PROGRESS NOTES
Patient: MATTHEW NEELY     Acct: [id number]     Report: #[id number]  UNIT #: [id number]     : 1965    Encounter Date:2020  PRIMARY CARE: NIKKI CAMERON  ***Signed***  --------------------------------------------------------------------------------------------------------------------  NURSE INTAKE      Visit Type      Established Patient Visit            Chief Complaint      BREAST CA F/U            Referring Provider/Copies To      Referring Provider:  NIKKI CAMERON      Primary Care Provider:  NIKKI CAMERON      Copies To:   NIKKI CAMERON            History and Present Illness      Past Oncology Illness History      routine screening mammogram on 10/28/19 which showed an area of asymmetry in the    right breast at the 12 oclock position.  She then underwent diagnostic mammogram    and ultrasound on 10/29/2019 which confirmed the abnormality in the right     breast.  Biopsy was recommended.  On 2019 she underwent biopsy of the     breast which showed an invasive ductal carcinoma, ER positive, DE positive, HER-    2/tabatha negative by FISH. 12/3/19 right lumpectomy.            HPI - Oncology Interim      Patient returns for ongoing surveillance of breast cancer.  She is now on     adjuvant hormone therapy with anastrozole started .  She is compliant with     her regimen.  She reports a rare hot flash but not interfering with normal daily    activities.  She reports that she had to have a course of antibiotics for     cellulitis of her right lumpectomy site but this is now resolved.  She denies     any other skin changes, masses, nipple pressure, discharge.  She is eating     drinking well, her weight is maintained.  She reports good energy level.            Cancer Details            Right breast.  Upper outer quadrant, 12 o'clock position, 3 cm from the      nipple.  Infiltrating ductal carcinoma.  ER positive, DE positive,      HER-2/tabatha negative      Oncotype DX Score 17            Clinical  Staging      T1b, N0, M0 = stage Ia            Treatments      Chemotherapy      1/20 anastrozole 1 mg daily      Radiation Therapy      2/12/2020 completed adjuvant radiation to the right breast      Surgeries      12/3/19 right lumpectomy            Clinical Trial Participant      No            ECOG Performance Status      0            PAST, FAMILY   Past Medical History      Other PMH:        BREAST CA      Hematology/Oncology (F):  Breast Cancer            Past Surgical History      Biopsy (RIGHT), Joint Replacement (LEFT KNEE), Lumpectomy (RIGHT BREAST)            GASTRIC AND BREAST REDUCTION            Family History      Family History:  Breast Cancer (MARINO AUNT), Liver Cancer (MAT GRANDMOTHER),     Prostate Cancer (MAT GRANDFATHER)            Aunt was diagnosed with breast cancer in her 60's            Social History      Marital Status:        Lives independently:  Yes      Number of Children:  0      Occupation:  [employer]            Tobacco Use      Tobacco status:  Former smoker (QUIT 2000)      Smoking history:  25-50 pack years            Alcohol Use      Alcohol intake:  None            Substance Use      Substance use:  Denies use            REVIEW OF SYSTEMS      General:  Admits: Weight Loss;          Denies: Fatigue      Eye:  Admits Corrective Lenses      ENT:  Denies Headache      Cardiovascular:  Denies Chest Pain      Respiratory:  Denies: Shortness of Air      Musculoskeletal:  Denies Back Pain; Admits Muscle Pain (PAIN IN R BREAST)      Neurologic:  Denies Dizziness, Denies Numbness\Tingling            VITAL SIGNS AND SCORES      Vitals      Weight 218 lbs 7.614 oz / 99.1 kg      Temperature 98.8 F / 37.11 C - Temporal      Pulse 68      Respirations 18      Blood Pressure 115/61 Sitting, Left Arm      Pulse Oximetry 98%, RM AIR            Pain Score      Pain Scale Used:  Numerical      Pain Intensity:  0            Fatigue Score      Fatigue (0-10 scale):  0 (none)            EXAM  "     General Appearance:  Positive for: Alert, Cooperative;          Negative for: Acute Distress      Neck:  Positive for: Supple;          Negative for: JVD, Masses      Respiratory:  Positive for: CTAB, Normal Respiratory Effort      Breast - Left:  Positive for: Appearance (Normal-appearing female breast);          Negative for: Adenopathy      Breast - Right:  Positive for: Appearance (Well-healed surgical incision on the     upper outer breast and axilla);          Negative for: Adenopathy      Abdomen/Gastro:  Positive for: Normal Active Bowel Sounds, Soft;          Negative for: Distention, Hepatosplenomegaly, Tenderness      Cardiovascular:  Positive for: RRR;          Negative for: Gallop, Murmur, Peripheral Edema, Rub      Psychiatric:  Positive for: Appropriate Affect, Intact Judgement      Lymphatic:  Negative for: Axillary, Cervical, Infraclavicular, Supraclavicular            PREVENTION      Hx Influenza Vaccination:  Yes      Date Influenza Vaccine Given:  Nov 1, 2019      2 or More Falls Past Year?:  No      Fall Past Year with Injury?:  No      Hx Pneumococcal Vaccination:  No      Encouraged to follow-up with:  PCP regarding preventative exams.      Chart initiated by      TAMANNA SPARKS MA            ALLERGY/MEDS      Allergies      Coded Allergies:             BACITRACIN (Verified  Adverse Reaction, Unknown, \"GIVES ME AN INFECTION\",     5/21/20)           GRAMICIDIN D (Verified  Adverse Reaction, Unknown, \"GIVES ME AN INFECTION\",    5/21/20)           NEOMYCIN (Verified  Adverse Reaction, Unknown, \"GIVES ME AN INFECTION\",     5/21/20)            Medications      Last Reconciled on 5/21/20 15:32 by [place]      traZODone HCl (Desyrel) 50 Mg Tablet      75 MG PO HS, #30 TAB 0 Refills         Reported         5/21/20       Calcium Carb/Vitamin D3/Vit K1 (Viactiv Soft Chew Tablet) 1 Each Tab.chew      1 EACH PO, TAB.CHEW         Reported         2/10/20       Anastrozole (Anastrozole) 1 Mg " Tablet      1 MG PO QDAY, #30 TAB         Reported         2/10/20       Meloxicam (Meloxicam*) 7.5 Mg Tablet      7.5 MG PO HS, #30 TAB 0 Refills         Reported         11/14/19       Naproxen Sodium (ALEVE) 220 Mg Capsule      220 MG PO PRN         Reported         8/28/12       Melatonin (MELATONIN) 10 Mg Tablet      10 MG PO QHS         Reported         8/28/12      Medications Reviewed:  Changes made to meds            IMPRESSION/PLAN      Diagnosis      Breast cancer         Malignant neoplasm of upper-outer quadrant of right breast in female,        estrogen receptor positive         Breast location: upper outer quadrant of breast         Estrogen receptor status: positive         Patient sex: female         Laterality: right            Notes      Patient is now on adjuvant hormone therapy.  She is tolerating well.  I see no     evidence of disease recurrence by history or physical examination.  She is     up-to-date on mammogram.  We discussed low-fat low-cholesterol diet and routine     aerobic exercise as lifestyle modifications to decrease the risk of breast     cancer recurrence.  I will see her back in 3 months for ongoing surveillance.      New Medications      * traZODone HCl (Desyrel) 50 MG TABLET: 75 MG PO HS #30            Patient Education            Aerobic Exercise      Patient Education Provided:  Yes            Electronically signed by [place]  05/21/2020 15:32       Disclaimer: Converted document may not contain table formatting or lab diagrams. Please see [employer] for the authenticated document.

## 2021-05-28 NOTE — PROGRESS NOTES
Patient: MATTHEW NEELY     Acct: [id number]     Report: #[id number]  UNIT #: [id number]     : 1965    Encounter Date:2019  PRIMARY CARE: NIKKI CAMERON  ***Signed***  --------------------------------------------------------------------------------------------------------------------  NURSE INTAKE      Visit Type      New Patient Visit            Chief Complaint      BREAST CA            Referring Provider/Copies To      Referring Provider:  NIKKI CAMERON      Primary Care Provider:  NIKKI CAMERON      Copies To:   NIKKI CAMERON            History and Present Illness      HPI - Oncology Interim      54-year-old white female who presents today for discussion of newly diagnosed     breast cancer.  Patient states she was in her usual health.  She has been     up-to-date on mammograms.  She went for her routine screening mammogram on 10/28    /19 which showed an area of asymmetry in the right breast at the 12 oclock     position.  She then underwent diagnostic mammogram and ultrasound on 10/29/2019     which confirmed the abnormality in the right breast.  Biopsy was recommended.      On 2019 she underwent biopsy of the breast which showed an invasive ductal     carcinoma, ER positive, AL positive, HER-2/tabatha equivocal.  FISH is currently     pending.      She denies any other masses, lymphadenopathy or unusual aches or pains.  She is     in her normal health.  She is understandably upset about the recent cancer     diagnosis.  She reports maternal grandfather with prostate cancer, maternal aunt    with breast cancer another maternal aunt suspected of breast cancer.            Cancer Details            Right breast.  Upper outer quadrant, 12 o'clock position, 3 cm from the      nipple.  Infiltrating ductal carcinoma.  ER positive, AL positive,      HER-2/tabatha equivocal by IHC            Clinical Trial Participant      No            ECOG Performance Status      0            Most Recent Imaging Findings       Patient: MATTHEW NEELY   Acct: #[id number]   Report: #[id number]            UNIT #: [id number]    DOS: 19 1359      INSURANCE:[employer] [facility]   ORDER #:[id number]      LOCATION:     : 1965            PROVIDERS      ADMITTING:     ATTENDING: NIKKI CAMERON      FAMILY:  NIKKI CAMERON   ORDERING:  NIKKI CAMERON         OTHER:    DICTATING:  Garry Jose DO            REQ #:[id number]   EXAM:[id number] - DIG DX UNI POST BX NO CAD RGHT      REASON FOR EXAM:        REASON FOR VISIT:  RT MASS            *******Signed******         This report includes an Addendum and supersedes previous reports for this exam.             PROCEDURE:   ULTRASOUND BREAST BX 1ST LESION, 2019, 14:13      DIG DX UNI POST BX NO CAD RT, 2019, 14:40             COMPARISON:   [facility], US, US BREAST RIGHT LIMITED,     10/29/2019, 10:46.  [facility], MG, DIG DIAG RIGHT ARUNA W 3D WARREN, 10/29/2019, 10:24.  [facility], DIG SCREENING BILAT ARUNA W 3D WARREN, 10/25/2019, 16:38.  [facility], DIG SCREENING BILAT ARUNA W 3D WARREN, 10/18/2018, 15:58.             INDICATIONS:   RT MASS             DESCRIPTION:         Following a discussion of risks, benefits, and alternatives to the procedure     informed consent was       obtained. The patient's medication list was reviewed and documented in the     medical record. The       patient was placed in the supine position on the ultrasound procedure table.     Initial screening       ultrasound images of the right breast were obtained. There was re-identification    of the 7 mm       spiculated solid mass seen in the superior right breast at 1200 hours 3 cm from     the nipple on       previous diagnostic imaging. A site overlying the finding was then marked under     ultrasound       guidance.              Time out was performed. The site was then prepped and draped in  "the usual     sterile fashion. 2%       lidocaine was used for cutaneous local anesthesia. Lidocaine mixed with     epinephrine was used for       deep soft tissue local anesthesia and to assist with hemostasis. The 12 gauge     Celero vacuum       assisted automated core biopsy needle was advanced under ultrasound guidance to     the lesion. A total       of 3 core biopsy specimens were obtained and placed in formalin to be sent to     pathology for further       analysis. A ribbon shaped clip was then placed at the site of biopsy under     ultrasound guidance.       Digital pressure was then held at the site of biopsy to assist with hemostasis.     The positioning of       the clip was confirmed using mammography following the procedure. The patient     tolerated the       procedure well without immediate complication.             CONCLUSION:   Successful ultrasound guided core biopsy of the right breast. The     patient tolerated the       procedure well without immediate complication. Specimens have been sent to     pathology for further       analysis.  Addendum will be dictated upon receiving final pathology for     concordance and       recommendations.              SOLEDAD ZAMUDIO DO             Electronically Signed and Approved By: SOLEDAD ZAMUDIO DO on 11/08/2019 at 15:27            ADDENDUM:              Final pathology results are \"invasive ductal carcinoma\".  This is a malignant     diagnosis and is       concordant with the imaging findings.  Dr. Lima's office was notified on     11/11/2019 at 1600 hours       of the this malignant pathology report.  Further appropriate oncologic and     surgical management is       warranted.            PAST, FAMILY   Past Medical History      Other PMH:        BREAST CA      Hematology/Oncology (F):  Breast Cancer            Past Surgical History      Biopsy (RIGHT), Joint Replacement (LEFT KNEE)            GASTRIC AND BREAST REDUCTION            Family History    "   Family History:  Breast Cancer (MAT AUNT), Liver Cancer (MAT GRANDMOTHER),     Prostate Cancer (MAT GRANDFATHER)            Aunt was diagnosed with breast cancer in her 60's            Social History      Marital Status:        Lives independently:  Yes      Number of Children:  0      Occupation:  [employer]            Tobacco Use      Tobacco status:  Former smoker (QUIT 2000)      Smoking history:  25-50 pack years            Alcohol Use      Alcohol intake:  None            Substance Use      Substance use:  Denies use            REVIEW OF SYSTEMS      General:  Denies: Appetite Change, Fatigue      Eye:  Admits Corrective Lenses; Denies Vision Changes      ENT:  Denies Headache, Denies Sore Throat      Cardiovascular:  Denies Chest Pain, Denies Palpitations      Respiratory:  Denies: Productive Cough, Shortness of Air      Gastrointestinal:  Denies Constipation, Denies Diarrhea      Genitourinary:  Denies Blood in Urine, Denies Incontinence      Musculoskeletal:  Denies Back Pain, Denies Muscle Pain      Integumentary:  Denies Lesions, Denies Rash      Neurologic:  Denies Dizziness, Denies Numbness\Tingling      Psychiatric:  Denies Anxiety, Denies Depression      Hematologic/Lymphatic:  Denies Bruising (FROM BIOPSY), Denies Bleeding, Denies     Enlarged Lymph Nodes      Reproductive:  Admits: Menopause            VITAL SIGNS AND SCORES      Vitals      Height 5 ft 2.99 in / 160 cm      Weight 221 lbs 5.469 oz / 100.4 kg      BSA 2.02 m2      BMI 39.2 kg/m2      Temperature 97.6 F / 36.44 C - Temporal      Pulse 65      Respirations 16      Blood Pressure 142/79 Sitting, Left Arm      Pulse Oximetry 99%, RM AIR            Pain Score      Pain Scale Used:  Numerical      Pain Intensity:  0            Fatigue Score      Fatigue (0-10 scale):  0 (none)            EXAM      General Appearance:  Positive for: Alert, Oriented x3, Cooperative;          Negative for: Acute Distress      Eye:  Positive for:  "Anicteric Sclerae, Moist Conjunctiva      HEENT:  Positive for: Oropharynx clear      Neck:  Positive for: Supple;          Negative for: JVD, Masses      Respiratory:  Positive for: CTAB, Normal Respiratory Effort      Breast - Left:  Positive for: Appearance (normal female breast);          Negative for: Adenopathy, Discharge, Mass, Skin Changes      Breast - Right:  Positive for: Appearance (area of ), Skin Changes (Bruising on     the lateral central breast related to recent biopsy);          Negative for: Adenopathy, Discharge, Mass      Abdomen/Gastro:  Positive for: Normal Active Bowel Sounds, Soft;          Negative for: Distention, Hepatosplenomegaly, Tenderness      Cardiovascular:  Positive for: RRR;          Negative for: Gallop, Murmur, Peripheral Edema, Rub      Psychiatric:  Positive for: Appropriate Affect, Intact Judgement      Lymphatic:  Negative for: Axillary, Cervical, Infraclavicular, Supraclavicular            PREVENTION      Hx Influenza Vaccination:  Yes      Date Influenza Vaccine Given:  Nov 1, 2019      2 or More Falls Past Year?:  No      Fall Past Year with Injury?:  No      Hx Pneumococcal Vaccination:  No      Encouraged to follow-up with:  PCP regarding preventative exams.      Chart initiated by      TAMANNA SPARKS MA            ALLERGY/MEDS      Allergies      Coded Allergies:             BACITRACIN (Verified  Adverse Reaction, Unknown, \"GIVES ME AN INFECTION\",     11/14/19)           GRAMICIDIN D (Verified  Adverse Reaction, Unknown, \"GIVES ME AN INFECTION\",    11/14/19)           NEOMYCIN (Verified  Adverse Reaction, Unknown, \"GIVES ME AN INFECTION\",     11/14/19)            Medications      Last Reconciled on 11/14/19 16:00 by [place]      traZODone HCl (traZODone HCl*) 50 Mg Tablet      75 MG PO QDAY, #15 TAB 0 Refills         Reported         11/14/19       Meloxicam (Meloxicam*) 7.5 Mg Tablet      7.5 MG PO HS, #30 TAB 0 Refills         Reported         11/14/19     "   Naproxen Sodium (ALEVE) 220 Mg Capsule      220 MG PO PRN         Reported         8/28/12       Melatonin (MELATONIN) 10 Mg Tablet      10 MG PO QHS         Reported         8/28/12      Medications Reviewed:  Changes made to meds            IMPRESSION/PLAN      Diagnosis      Breast cancer         Malignant neoplasm of upper-outer quadrant of right breast in female,        estrogen receptor positive         Breast location: upper outer quadrant of breast         Estrogen receptor status: positive         Patient sex: female         Laterality: right      Patient has biopsy-proven breast cancer.  On mammogram and ultrasound it appears    to be a small lesion 7 mm in size.  Hormone receptor positive.  HER-2 is still     pending by fish, equivocal on IHC.  Clinically node-negative.  This appears to     be a stage I breast cancer.  She is a good candidate for breast conserving     therapy with sentinel node biopsy.  She would need adjuvant radiation after     lumpectomy.  The decision for adjuvant chemotherapy would be based upon the     final pathology-size and lymph node status.  The HER-2 could also plan to that     decision making.  She is hormone receptor positive and postmenopausal.  She     would benefit from adjuvant aromatase inhibitor therapy upon completion of other    modalities.  Given her family history with dad, aunt and herself, I feel she     would benefit from genetic counseling to which she is agreeable.  I will need to    see her back in the postoperative setting to review her final pathology and     finalize adjuvant treatment planning.            Notes      New Medications      * Meloxicam (Meloxicam*) 7.5 MG TABLET: 7.5 MG PO HS #30      * traZODone HCl (traZODone HCl*) 50 MG TABLET: 75 MG PO QDAY #15      New Referrals      * Genetic Counseling, Routine         GENETIC COUNSELING            Patient Education            Breast Cancer in Women      Eat Well, Exercise Well, Be Well: Dietary and  Fitness Guidelines      Patient Education Provided:  Yes            Electronically signed by [place]  11/14/2019 16:00       Disclaimer: Converted document may not contain table formatting or lab diagrams. Please see [employer] for the authenticated document.

## 2021-05-28 NOTE — PROGRESS NOTES
Patient: MATTHEW NEELY     Acct: [id number]     Report: #[id number]  UNIT #: [id number]     : 1965    Encounter Date:02/10/2020  PRIMARY CARE: NIKKI CAMERON  ***Signed***  --------------------------------------------------------------------------------------------------------------------  NURSE INTAKE      Visit Type      Established Patient Visit            Chief Complaint      R BREAST CA FOLLOW UP            Referring Provider/Copies To      Referring Provider:  NIKKI CAMERON      Primary Care Provider:  NIKKI CAMERON      Copies To:   NIKKI CAMERON            History and Present Illness      Past Oncology Illness History      routine screening mammogram on 10/28/19 which showed an area of asymmetry in the    right breast at the 12 oclock position.  She then underwent diagnostic mammogram    and ultrasound on 10/29/2019 which confirmed the abnormality in the right     breast.  Biopsy was recommended.  On 2019 she underwent biopsy of the     breast which showed an invasive ductal carcinoma, ER positive, MN positive, HER-    2/tabatha negative by FISH            HPI - Oncology Interim      Patient returns for follow-up of breast cancer.  She is currently receiving     adjuvant radiation and has 2 fractions remaining.  She notes some skin     irritation of the right breast as well as some fatigue but she is still able to     perform all of her normal daily activities.  Aloe vera cream has been helping     the skin irritation.  She denies new masses lymphadenopathy.  No unusual aches     or pains.  She is eating and drinking normally, her weight is maintained.      Radiation oncology started her on anastrozole 1 mg daily.  She has been taking     it for the last couple weeks.  She is tolerating it well.  She denies any     problems or side effects from medication.            Cancer Details            Right breast.  Upper outer quadrant, 12 o'clock position, 3 cm from the      nipple.  Infiltrating ductal  carcinoma.  ER positive, WA positive,      HER-2/tabatha negative      Oncotype DX Score 17            Clinical Staging      T1b, N0, M0 = stage Ia            Treatments      Chemotherapy      1/20 anastrozole 1 mg daily      Radiation Therapy      2/12/2020 completed adjuvant radiation to the right breast      Surgeries      12/3/19 right lumpectomy            Clinical Trial Participant      No            ECOG Performance Status      0            PAST, FAMILY   Past Medical History      Other PMH:        BREAST CA      Hematology/Oncology (F):  Breast Cancer            Past Surgical History      Biopsy (RIGHT), Joint Replacement (LEFT KNEE), Lumpectomy (RIGHT BREAST)            GASTRIC AND BREAST REDUCTION            Family History      Family History:  Breast Cancer (MAT AUNT), Liver Cancer (MARINO GRANDMOTHER),     Prostate Cancer (MARINO GRANDFATHER)            Aunt was diagnosed with breast cancer in her 60's            Social History      Marital Status:        Lives independently:  Yes      Number of Children:  0      Occupation:  [employer]            Tobacco Use      Tobacco status:  Former smoker (QUIT 2000)      Smoking history:  25-50 pack years            Alcohol Use      Alcohol intake:  None            Substance Use      Substance use:  Denies use            REVIEW OF SYSTEMS      General:  Admits: Fatigue, Weight Loss      Eye:  Admits Corrective Lenses      ENT:  Denies Headache      Cardiovascular:  Denies Chest Pain      Respiratory:  Denies: Shortness of Air      Musculoskeletal:  Denies Back Pain, Denies Muscle Pain      Integumentary:  Admits Itching, Admits Rash            VITAL SIGNS AND SCORES      Vitals      Weight 223 lbs 1.688 oz / 101.2 kg      Temperature 97.7 F / 36.5 C - Temporal      Pulse 70      Respirations 16      Blood Pressure 145/78 Sitting, Left Arm      Pulse Oximetry 99%, RM AIR            Pain Score      Pain Scale Used:  Numerical            Fatigue Score      Fatigue  "(0-10 scale):  0 (none)            EXAM      General Appearance:  Positive for: Alert, Oriented x3, Cooperative;          Negative for: Acute Distress      Eye:  Positive for: Anicteric Sclerae, Moist Conjunctiva      Neck:  Positive for: Supple;          Negative for: JVD, Masses      Respiratory:  Positive for: CTAB, Normal Respiratory Effort      Breast - Right:  Positive for: Appearance (Well-healed surgical incision on the     breast and axilla.  Radiation dermatitis without ulceration to the skin);          Negative for: Adenopathy      Abdomen/Gastro:  Positive for: Normal Active Bowel Sounds, Soft;          Negative for: Distention, Hepatosplenomegaly, Tenderness      Cardiovascular:  Positive for: RRR;          Negative for: Gallop, Murmur, Peripheral Edema, Rub      Psychiatric:  Positive for: Appropriate Affect, Intact Judgement      Lymphatic:  Negative for: Axillary, Cervical, Infraclavicular, Supraclavicular            PREVENTION      Hx Influenza Vaccination:  Yes      Date Influenza Vaccine Given:  Nov 1, 2019      2 or More Falls Past Year?:  No      Fall Past Year with Injury?:  No      Hx Pneumococcal Vaccination:  No      Encouraged to follow-up with:  PCP regarding preventative exams.      Chart initiated by      TAMANNA SPARKS MA            ALLERGY/MEDS      Allergies      Coded Allergies:             BACITRACIN (Verified  Adverse Reaction, Unknown, \"GIVES ME AN INFECTION\",     2/10/20)           GRAMICIDIN D (Verified  Adverse Reaction, Unknown, \"GIVES ME AN INFECTION\",    2/10/20)           NEOMYCIN (Verified  Adverse Reaction, Unknown, \"GIVES ME AN INFECTION\",     2/10/20)            Medications      Last Reconciled on 2/10/20 15:56 by K[place]      Calcium Carb/Vitamin D3/Vit K1 (Viactiv Soft Chew Tablet) 1 Each Tab.chew      1 EACH PO, TAB.CHEW         Reported         2/10/20       diphenhydrAMINE HCl (BENADRYL) 25 Mg Capsule      25 MG PO HS, #100 TAB         Reported         " 2/10/20       Anastrozole (Anastrozole) 1 Mg Tablet      1 MG PO QDAY, #30 TAB         Reported         2/10/20       Meloxicam (Meloxicam*) 7.5 Mg Tablet      7.5 MG PO HS, #30 TAB 0 Refills         Reported         11/14/19       Naproxen Sodium (ALEVE) 220 Mg Capsule      220 MG PO PRN         Reported         8/28/12       Melatonin (MELATONIN) 10 Mg Tablet      10 MG PO QHS         Reported         8/28/12      Medications Reviewed:  Changes made to meds            IMPRESSION/PLAN      Diagnosis      Breast cancer         Malignant neoplasm of right breast in female, estrogen receptor positive,        unspecified site of breast         Breast location: unspecified site of breast         Estrogen receptor status: positive         Patient sex: female         Laterality: right            Notes      Patient is on adjuvant anastrozole 1 mg daily.  This will be continued for a     minimum 5 years.  Tolerating well.  She is completing adjuvant radiation and has    only mild skin irritation and fatigue which should resolve with time.  Radiation    oncology records will be requested.  We discussed low-fat low-cholesterol diet     and routine aerobic exercise as lifestyle modifications to decrease the risk of     breast cancer recurrence.  I will see her back in 3 months for ongoing     surveillance.      New Medications      * Anastrozole 1 MG TABLET: 1 MG PO QDAY #30      * diphenhydrAMINE HCl (BENADRYL) 25 MG CAPSULE: 25 MG PO HS #100      * Calcium Carb/Vitamin D3/Vit K1 (Viactiv Soft Chew Tablet) 1 EACH TAB.CHEW: 1       EACH PO      Discontinued Medications      * HYDROcodone-Acetaminophen 5-325 Mg 1 EACH TABLET: 2 TAB PO Q4H PRN PAIN #30            Patient Education      Patient Education Provided:  Yes            Electronically signed by K[place]  02/10/2020 15:56       Disclaimer: Converted document may not contain table formatting or lab diagrams. Please see [employer] for the  authenticated document.

## 2021-05-28 NOTE — PROGRESS NOTES
Patient: MATTHEW NEELY     Acct: [id number]     Report: #[id number]  UNIT #: [id number]     : 1965    Encounter Date:2019  PRIMARY CARE: NIKKI CAMERON  ***Signed***  --------------------------------------------------------------------------------------------------------------------  NURSE INTAKE      Visit Type      Established Patient Visit            Chief Complaint      BREAST CA F/U            Referring Provider/Copies To      Referring Provider:  NIKKI CAMERON      Primary Care Provider:  NIKKI CAMERON            History and Present Illness      Past Oncology Illness History      routine screening mammogram on 10/28/19 which showed an area of asymmetry in the    right breast at the 12 oclock position.  She then underwent diagnostic mammogram    and ultrasound on 10/29/2019 which confirmed the abnormality in the right     breast.  Biopsy was recommended.  On 2019 she underwent biopsy of the     breast which showed an invasive ductal carcinoma, ER positive, ND positive, HER-    2/tabatha negative by FISH            HPI - Oncology Interim      Patient presents today for follow-up of breast cancer.  Since her last visit,     she has had a right lumpectomy with sentinel node biopsy.  She notes a little     bit of fullness under the axillary incision but otherwise is healing well.  She     has an appointment later today with radiation oncology to discuss adjuvant r    adiation.  She denies any masses lymphadenopathy.  No unusual aches or pains.      She is eating drinking well, her weight is maintained.            Cancer Details            Right breast.  Upper outer quadrant, 12 o'clock position, 3 cm from the      nipple.  Infiltrating ductal carcinoma.  ER positive, ND positive,      HER-2/tabatha negative      Oncotype DX Score 17            Clinical Staging      T1b, N0, M0 = stage Ia            Treatments      Surgeries      12/3/19 right lumpectomy            Clinical Trial Participant      No             ECOG Performance Status      0            PAST, FAMILY   Past Medical History      Other PMH:        BREAST CA      Hematology/Oncology (F):  Breast Cancer            Past Surgical History      Biopsy (RIGHT), Joint Replacement (LEFT KNEE), Lumpectomy (RIGHT BREAST)            GASTRIC AND BREAST REDUCTION            Family History      Family History:  Breast Cancer (MAT AUNT), Liver Cancer (MAT GRANDMOTHER),     Prostate Cancer (MAT GRANDFATHER)            Aunt was diagnosed with breast cancer in her 60's            Social History      Marital Status:        Lives independently:  Yes      Number of Children:  0      Occupation:  [employer]            Tobacco Use      Tobacco status:  Former smoker (QUIT 2000)      Smoking history:  25-50 pack years            Alcohol Use      Alcohol intake:  None            Substance Use      Substance use:  Denies use            REVIEW OF SYSTEMS      General:  Denies: Fatigue      Eye:  Denies Blurred Vision      ENT:  Denies Headache      Cardiovascular:  Denies Chest Pain      Musculoskeletal:  Denies Back Pain; Admits Muscle Pain      Neurologic:  Denies Numbness\Tingling      Psychiatric:  Denies Depression      Hematologic/Lymphatic:  Denies Bruising, Denies Bleeding            VITAL SIGNS AND SCORES      Vitals      Weight 228 lbs 6.345 oz / 103.6 kg      Temperature 98 F / 36.67 C - Temporal      Pulse 63      Respirations 18      Blood Pressure 139/66 Sitting, Left Arm      Pulse Oximetry 97%, RM AIR            EXAM      General Appearance:  Positive for: Alert, Oriented x3, Cooperative;          Negative for: Acute Distress      Neck:  Positive for: Supple;          Negative for: JVD, Masses      Respiratory:  Positive for: CTAB, Normal Respiratory Effort      Breast - Left:  Positive for: Appearance (Normal-appearing female breast);          Negative for: Adenopathy      Breast - Right:  Positive for: Appearance (Healing surgical incision on the    "  breast and axilla, small seroma underlying the axillary incision);          Negative for: Adenopathy      Abdomen/Gastro:  Positive for: Normal Active Bowel Sounds, Soft;          Negative for: Distention, Hepatosplenomegaly, Tenderness      Cardiovascular:  Positive for: RRR;          Negative for: Gallop, Murmur, Peripheral Edema, Rub      Psychiatric:  Positive for: Appropriate Affect, Intact Judgement      Lymphatic:  Negative for: Axillary, Cervical, Infraclavicular, Supraclavicular            PREVENTION      Hx Influenza Vaccination:  Yes      Date Influenza Vaccine Given:  Nov 1, 2019      2 or More Falls Past Year?:  No      Fall Past Year with Injury?:  No      Hx Pneumococcal Vaccination:  No      Encouraged to follow-up with:  PCP regarding preventative exams.      Chart initiated by      TAMANNA SPARKS MA            ALLERGY/MEDS      Allergies      Coded Allergies:             BACITRACIN (Verified  Adverse Reaction, Unknown, \"GIVES ME AN INFECTION\",     12/18/19)           GRAMICIDIN D (Verified  Adverse Reaction, Unknown, \"GIVES ME AN INFECTION\",    12/18/19)           NEOMYCIN (Verified  Adverse Reaction, Unknown, \"GIVES ME AN INFECTION\",     12/18/19)            Medications      Last Reconciled on 12/18/19 16:30 by [place]      Amoxicillin (Amoxicillin) 250 Mg/5 Ml Susp.recon      500 MG PO TID, #200 ML         Reported         12/18/19       Hydrocodone/Acetaminophen 5/325 MG (Hydrocodone/Acetaminophen 5/325 MG) 1 Each     Tablet      2 TAB PO Q4H PRN for PAIN, #30 TAB         Prov: CIHP OSBORNE MD         12/3/19       traZODone HCl (traZODone HCl) 150 Mg Tablet      75 MG PO HS, #30 TAB 0 Refills         Reported         11/27/19       Meloxicam (Meloxicam*) 7.5 Mg Tablet      7.5 MG PO HS, #30 TAB 0 Refills         Reported         11/14/19       Naproxen Sodium (ALEVE) 220 Mg Capsule      220 MG PO PRN         Reported         8/28/12       Melatonin (MELATONIN) 10 Mg Tablet    "   10 MG PO QHS         Reported         8/28/12      Medications Reviewed:  Changes made to meds            IMPRESSION/PLAN      Diagnosis      Breast cancer         Malignant neoplasm of central portion of right breast in female, estrogen        receptor positive         Breast location: central portion of breast         Estrogen receptor status: positive         Patient sex: female         Laterality: right            Notes      Status post lumpectomy with sentinel node biopsy.  Oncotype DX places her in low    risk category, she does not require adjuvant chemotherapy.  She will require     adjuvant radiation and has an appointment later today for that discussion.  She     would benefit from aromatase inhibitor after radiation, specifically letrozole     2.5 mg daily.  This is given for 5 years.  We also discussed low-fat     low-cholesterol diet and routine aerobic exercise as lifestyle modifications to     decrease the risk of breast cancer recurrence.  I will plan to see her back     after radiation to review letrozole and initiate      New Medications      * Amoxicillin 250 MG/5 ML SUSP.RECON: 500 MG PO TID #200            Patient Education            Aerobic Exercise      Eat Well, Exercise Well, Be Well: Dietary and Fitness Guidelines      Letrozole      Patient Education Provided:  Yes            Electronically signed by [place]  12/18/2019 16:30       Disclaimer: Converted document may not contain table formatting or lab diagrams. Please see [employer] for the authenticated document.

## 2021-06-05 NOTE — PROGRESS NOTES
Denia Wooten  1965     Office/Outpatient Visit    Visit Date: Tue, May 18, 2021 03:07 pm    Provider: Mariel Lima MD (Assistant: Ondina Hudson, )    Location: [facility]        Electronically signed by Mariel Lima MD on  05/21/2021 10:56:07 AM                             Subjective:        CC: repeat pap    HPI:       she fell 2 weeks ago with a twisting L ankle injury while in the yard she stepped in hole, she went to urgent care and told ankle and foot sprain, neg xrays for FX, she is using ice and NSAIDs without relief, she is still very sore, samuel to bear weight in L lateral ankle.           Essential (primary) hypertension details; current nonpharmacologic treatment includes low sodium diet.  Her current cardiac medication regimen includes off BB.  Compliance with treatment has been good; she takes her medication as directed and follows up as directed.  She is tolerating the medication well without side effects.  She has not kept a blood pressure diary, but states that pressures have been okay.      ROS:     CONSTITUTIONAL:  Negative for chills, fatigue, fever, and weight change.      CARDIOVASCULAR:  Negative for chest pain, palpitations, tachycardia, orthopnea, and edema.      RESPIRATORY:  Negative for cough, dyspnea, and hemoptysis.      GASTROINTESTINAL:  Negative for abdominal pain, heartburn, constipation, diarrhea, and stool changes.      INTEGUMENTARY/BREAST:  Positive for bruising.   Negative for rash.      NEUROLOGICAL:  Negative for dizziness, headaches, paresthesias, and weakness.      PSYCHIATRIC:  Negative for anxiety, depression, and sleep disturbances.          Past Medical History / Family History / Social History:         Last Reviewed on 5/18/2021 03:47 PM by Mariel Lima    Past Medical History:         borderline Hypertension     Peptic Ulcer Disease: dx'd at age [age];     h/o freq UTI's     chronic knee pain R knee    back pain      diet controlled hyperglycemia     Obesity: morbid;     LE edema         GYNECOLOGICAL HISTORY:             PREVENTIVE HEALTH MAINTENANCE             BONE DENSITY: was last done 10-18-18 with normal results     COLORECTAL CANCER SCREENING: Up to date (colonoscopy q10y; sigmoidoscopy q5y; Cologuard q3y) was last done 10/27/16, Results are in chart; colonoscopy with normal results     MAMMOGRAM: Done within last 2 years and results in are chart was last done 10/2020 with normal results         Surgical History:         Breast reduction-    Foot surgery      Lap Band-     L TKR 10/2017;         Family History:         Positive for Coronary Artery Disease ( father; mother -- h/o CABG X3 ), Hyperlipidemia ( father ) and Hypertension ( father; mother ).      Positive for Cancer- type not specified ( mat. aunt ).      Positive for sleep apnea in father.      Positive for Type 2 Diabetes ( mother; sister; mat. GF ).          Social History:     Occupation: [employer] in [place]     Marital Status:      Children: None         Tobacco/Alcohol/Supplements:     Last Reviewed on 2021 03:14 PM by Ondina Hudson    Tobacco: She has a past history of cigarette smoking; quit date:  .          Substance Abuse History:     Last Reviewed on 2018 09:48 PM by Marquez Portillo        Mental Health History:     Last Reviewed on 2018 09:48 PM by Marquez Portillo        Communicable Diseases (eg STDs):     Last Reviewed on 2018 09:48 PM by Marquez Portillo        Allergies:     Last Reviewed on 2021 03:58 PM by Oneyda Alan    Sulfas:          Current Medications:     Last Reviewed on 2021 03:58 PM by Oneyda Alan    traZODone 150 mg oral tablet [TAKE ONE-HALF TO ONE TABLET BY MOUTH EVERY NIGHT AT BEDTIME AS NEEDED]    Melatonin     Meloxicam     centrum multivitamin 1 tablet daily     ANASTROZOLE  TAB 1MG  [1 tab daily]        Objective:        Vitals:          Current: 5/18/2021 3:13:12 PM    Ht:  5 ft, 3 in;  Wt: 200.8 lbs;  BMI: 35.6T: 97 F (temporal);  BP: 134/75 mm Hg (right arm, sitting);  P: 74 bpm (right arm (BP Cuff), sitting);  sCr: 0.58 mg/dL;  GFR: 120.23        Exams:     PHYSICAL EXAM:     GENERAL: vital signs recorded - well developed, well nourished;  no apparent distress;     RESPIRATORY: CTA B WITHOUT WHEEZING/RALES OR RHONCHI, NORMAL RESP. EFFORT     CARDIOVASCULAR: normal rate; rhythm is regular;  no systolic murmur; no edema;     GENITOURINARY: external genitalia: normal without lesions or urethral abnormalities;;  vagina: normal with good pelvic support and no lesions or discharge;; cervix: pap performed noted;     MUSCULOSKELETAL: L ankle-tenderness and edema lateral malleolus with bruising in medial and lateral foot, walking with a limp.;     NEUROLOGICAL:  cranial nerves, motor and sensory function, reflexes, gait and coordination are all intact;     PSYCHIATRIC:  appropriate affect and demeanor; normal speech pattern; grossly normal memory;         Assessment:         Z01.419   Encounter for gynecological examination (general) (routine) without abnormal findings       M25.572   Pain in left ankle and joints of left foot       I10   Essential (primary) hypertension           ORDERS:         Radiology/Test Orders:       [id number]  Radiologic examination, left ankle; complete minimum 3 views  (Send-Out)              Lab Orders:       [id number]  Cytopathology, slides, cervical or vaginal; manual screening under MD supervision  (Send-Out)              Procedures Ordered:       [id number]  Handlg&/or convey of spec for TR office to lab  (In-House)              Other Orders:       PAPSMR  Pap smear performed  (Send-Out)                      Plan:         Encounter for gynecological examination (general) (routine) without abnormal findingspap performed, last pap was unsuccessful          Orders:       PAPSMR  Pap smear performed  (Send-Out)            [id number]   Cytopathology, slides, cervical or vaginal; manual screening under MD supervision  (Send-Out)            [id number]  Handlg&/or convey of spec for TR office to lab  (In-House)              Pain in left ankle and joints of left footc/w ankle sprain, I am concerned about avulsion fracture, will re xray anklecont ice, rest and start an ankle splint-script given, sent for repeat xray L ankle        RADIOLOGY:  I have ordered a left ankle xray to be done today.            Orders:       [id number]  Radiologic examination, left ankle; complete minimum 3 views  (Send-Out)              Essential (primary) hypertensionstable, off meds            Patient Recommendations:        For  Pain in left ankle and joints of left foot:    left ankle x-ray             Charge Capture:         Primary Diagnosis:     Z01.419  Encounter for gynecological examination (general) (routine) without abnormal findings           Orders:      [id number]  Handlg&/or convey of spec for TR office to lab  (In-House)              M25.572  Pain in left ankle and joints of left foot           Orders:      [id number]  Office/outpatient visit; established patient, level 4  (In-House)              I10  Essential (primary) hypertension

## 2021-06-07 DIAGNOSIS — S82.839A AVULSION FRACTURE OF DISTAL END OF FIBULA: Primary | ICD-10-CM

## 2021-06-07 PROBLEM — N20.0 KIDNEY STONES: Status: ACTIVE | Noted: 2021-06-07

## 2021-06-07 RX ORDER — MELOXICAM 7.5 MG/1
TABLET ORAL
Status: ON HOLD | COMMUNITY
End: 2022-01-25

## 2021-06-07 RX ORDER — MELATONIN 10 MG
CAPSULE ORAL
COMMUNITY
End: 2022-11-28

## 2021-06-07 RX ORDER — CYCLOBENZAPRINE HCL 10 MG
TABLET ORAL
COMMUNITY
Start: 2021-05-08 | End: 2022-11-28

## 2021-06-07 RX ORDER — NAPROXEN SODIUM 220 MG
TABLET ORAL
COMMUNITY

## 2021-06-07 RX ORDER — IBUPROFEN 800 MG/1
TABLET ORAL
COMMUNITY
Start: 2021-05-08 | End: 2022-11-28

## 2021-06-08 ENCOUNTER — HOSPITAL ENCOUNTER (OUTPATIENT)
Dept: GENERAL RADIOLOGY | Facility: HOSPITAL | Age: 56
Discharge: HOME | End: 2021-06-08
Admitting: PHYSICIAN ASSISTANT

## 2021-06-08 DIAGNOSIS — S82.839A AVULSION FRACTURE OF DISTAL END OF FIBULA: ICD-10-CM

## 2021-06-08 PROCEDURE — 73610 X-RAY EXAM OF ANKLE: CPT

## 2021-06-09 ENCOUNTER — OFFICE VISIT (OUTPATIENT)
Dept: ORTHOPEDIC SURGERY | Facility: CLINIC | Age: 56
End: 2021-06-09

## 2021-06-09 VITALS — BODY MASS INDEX: 34.91 KG/M2 | TEMPERATURE: 98 F | WEIGHT: 197 LBS | HEIGHT: 63 IN

## 2021-06-09 DIAGNOSIS — S82.839A AVULSION FRACTURE OF DISTAL END OF FIBULA: Primary | ICD-10-CM

## 2021-06-09 PROCEDURE — 99024 POSTOP FOLLOW-UP VISIT: CPT | Performed by: PHYSICIAN ASSISTANT

## 2021-06-09 NOTE — PROGRESS NOTES
FRACTURE CARE VISIT (Global)      NAME: Denia Wooten           : 1965    MRN: [id number]      Chief Complaint   Patient presents with   • Left Ankle - Follow-up, Pain     Date of Fracture: 21  ?     HPI2  Denia Wooten presents for 4 week follow up s/p avulsion fracture of distal fibula. She has had no current complaints. 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  She denies pain but does report some swelling.          Physical Exam  General: alert, appears stated age, cooperative and no distress  Physical Exam:  Signs of infection: [x] no                  [] yes   Swelling: [x] no                  [] yes   Skin wound: [] healing well   [] healed well   [x] skin intact    Motor exam intact: [] no                  [x] yes   Neurovascular exam intact: [] no                  [x] yes   Signs of compartment syndrome: [x] no                  [] yes   Signs of DVT: [x] no                  [] yes   Ecchymosis: [x] no                  [] yes     Musculoskeletal:   LEFT ankle  Positive for mild diffuse ankle swelling and a mild amount of pedal edema on the distal tibia.    Negative for tenderness of the distal fibula, below the level of the syndesmosis.  The syndesmosis itself is stable.   Positive for mild restricted dorsiflexion and plantarflexion.   External rotation and squeeze tests are negative.   Calf is soft and nontender.  Dorsalis pedis artery is palpable. Common peroneal nerve function is well preserved.  No evidence of clinical deformity or clinical signs of instability.    Negative for proximal fibular tenderness.        Diagnostic Data:  Left ankle xrays 3 views were ordered by Garry Coronado PA-C. Performed at [facility] on 2021. Images were independently viewed and interpreted by myself, my impression as follows:  Findings: Callus formation noted at the lateral  aspect of the distal fibula and questionable callus formation noted at the tip of the medial malleolus   Bony lesion: no  Soft tissues: subnormal  Joint spaces: within normal limits  Hardware appropriately positioned: not applicable       Assessment/Plan   Assessment:    1. Avulsion fracture of distal end of fibula          Plan:       • Discussion of any imaging in detail. Discussion of orthopaedic goals.  • Risk, benefits, and merits of treatment alternatives reviewed with the patient. Treatment alternatives include: bracing. Advised her to use the ASO brace while working and keep the cam walking boot nearby for the next 2 weeks in case needed/she has pain.  • Ice, heat, and/or modalities as beneficial  • Patient is encouraged to call or return for any issues or concerns.  • Ankle stabilizing lace-up brace.  • Follow up in 4 weeks         Garry Coronado PA-C  06/09/2021     Electronically signed by Garry Coronado PA-C, 06/09/21, 6:10 AM EDT.    EMR Dragon/Transcription disclaimer:  Much of this encounter note is an electronic transcription/translation of spoken language to printed text. The electronic translation of spoken language may permit erroneous, or at times, nonsensical words or phrases to be inadvertently transcribed; Although I have reviewed the note for such errors, some may still exist.

## 2021-06-15 ENCOUNTER — TRANSCRIBE ORDERS (OUTPATIENT)
Dept: ADMINISTRATIVE | Facility: HOSPITAL | Age: 56
End: 2021-06-15

## 2021-06-15 DIAGNOSIS — Z12.31 BREAST CANCER SCREENING BY MAMMOGRAM: Primary | ICD-10-CM

## 2021-06-28 RX ORDER — TRAZODONE HYDROCHLORIDE 150 MG/1
TABLET ORAL
Qty: 90 TABLET | Refills: 0 | Status: SHIPPED | OUTPATIENT
Start: 2021-06-28 | End: 2022-01-11

## 2021-07-01 VITALS
BODY MASS INDEX: 36.43 KG/M2 | HEIGHT: 63 IN | HEART RATE: 70 BPM | DIASTOLIC BLOOD PRESSURE: 75 MMHG | WEIGHT: 205.6 LBS | SYSTOLIC BLOOD PRESSURE: 143 MMHG | TEMPERATURE: 98.6 F

## 2021-07-01 VITALS
TEMPERATURE: 97.8 F | BODY MASS INDEX: 39.48 KG/M2 | DIASTOLIC BLOOD PRESSURE: 72 MMHG | SYSTOLIC BLOOD PRESSURE: 132 MMHG | HEIGHT: 63 IN | WEIGHT: 222.8 LBS | HEART RATE: 70 BPM

## 2021-07-01 VITALS
HEIGHT: 63 IN | WEIGHT: 204.3 LBS | BODY MASS INDEX: 36.2 KG/M2 | SYSTOLIC BLOOD PRESSURE: 130 MMHG | HEART RATE: 83 BPM | DIASTOLIC BLOOD PRESSURE: 83 MMHG | TEMPERATURE: 96.6 F

## 2021-07-01 VITALS
HEIGHT: 63 IN | TEMPERATURE: 98 F | SYSTOLIC BLOOD PRESSURE: 126 MMHG | DIASTOLIC BLOOD PRESSURE: 83 MMHG | BODY MASS INDEX: 36.57 KG/M2 | HEART RATE: 81 BPM | WEIGHT: 206.4 LBS

## 2021-07-02 VITALS
WEIGHT: 200.8 LBS | HEART RATE: 74 BPM | DIASTOLIC BLOOD PRESSURE: 75 MMHG | HEIGHT: 63 IN | BODY MASS INDEX: 35.58 KG/M2 | SYSTOLIC BLOOD PRESSURE: 134 MMHG | TEMPERATURE: 97 F

## 2021-07-02 VITALS
SYSTOLIC BLOOD PRESSURE: 129 MMHG | HEART RATE: 67 BPM | WEIGHT: 202.2 LBS | TEMPERATURE: 96.9 F | HEIGHT: 63 IN | BODY MASS INDEX: 35.83 KG/M2 | DIASTOLIC BLOOD PRESSURE: 70 MMHG

## 2021-07-02 VITALS
DIASTOLIC BLOOD PRESSURE: 67 MMHG | HEIGHT: 63 IN | HEART RATE: 79 BPM | WEIGHT: 222.8 LBS | TEMPERATURE: 97.8 F | BODY MASS INDEX: 39.48 KG/M2 | SYSTOLIC BLOOD PRESSURE: 138 MMHG

## 2021-07-02 DIAGNOSIS — S82.839A AVULSION FRACTURE OF DISTAL END OF FIBULA: Primary | ICD-10-CM

## 2021-07-07 ENCOUNTER — OFFICE VISIT (OUTPATIENT)
Dept: ORTHOPEDIC SURGERY | Facility: CLINIC | Age: 56
End: 2021-07-07

## 2021-07-07 ENCOUNTER — HOSPITAL ENCOUNTER (OUTPATIENT)
Dept: GENERAL RADIOLOGY | Facility: HOSPITAL | Age: 56
Discharge: HOME | End: 2021-07-07
Admitting: PHYSICIAN ASSISTANT

## 2021-07-07 VITALS — HEIGHT: 63 IN | WEIGHT: 197 LBS | BODY MASS INDEX: 34.91 KG/M2

## 2021-07-07 DIAGNOSIS — S82.839A AVULSION FRACTURE OF DISTAL END OF FIBULA: ICD-10-CM

## 2021-07-07 DIAGNOSIS — S93.402A SPRAIN OF LEFT ANKLE, UNSPECIFIED LIGAMENT, INITIAL ENCOUNTER: ICD-10-CM

## 2021-07-07 DIAGNOSIS — S82.839A AVULSION FRACTURE OF DISTAL END OF FIBULA: Primary | ICD-10-CM

## 2021-07-07 PROCEDURE — 73610 X-RAY EXAM OF ANKLE: CPT

## 2021-07-07 PROCEDURE — 99024 POSTOP FOLLOW-UP VISIT: CPT | Performed by: PHYSICIAN ASSISTANT

## 2021-07-15 NOTE — PROGRESS NOTES
FRACTURE CARE VISIT (Global)      NAME: Denia Wooten           : 1965    MRN: [id number]      Chief Complaint   Patient presents with   • Left Ankle - Follow-up     Date of Fracture: 21  ?     HPI2  Denia Wooten presents for 8 week follow up s/p avulsion fracture of distal fibula. She has had no current complaints. Overall she can tell she is improving, she denies much discomfort. She is using ankle brace as needed.          Physical Exam  General: alert, appears stated age, cooperative and no distress  Physical Exam:  Signs of infection: [x] no                  [] yes   Swelling: [x] no                  [] yes   Skin wound: [] healing well   [] healed well   [x] skin intact    Motor exam intact: [] no                  [x] yes   Neurovascular exam intact: [] no                  [x] yes   Signs of compartment syndrome: [x] no                  [] yes   Signs of DVT: [x] no                  [] yes   Ecchymosis: [x] no                  [] yes     Musculoskeletal:   LEFT ankle  Positive for mild diffuse ankle swelling.  Negative for tenderness of the distal fibula, below the level of the syndesmosis.  The syndesmosis itself is stable.   Positive for mild restricted dorsiflexion and plantarflexion.   External rotation and squeeze tests are negative.   Calf is soft and nontender.  Dorsalis pedis artery is palpable. Common peroneal nerve function is well preserved.  No evidence of clinical deformity or clinical signs of instability.    Negative for proximal fibular tenderness.        Diagnostic Data:  Left ankle xrays 3 views were ordered by Garry Coronado PA-C. Performed at [facility] on 2021. Images were independently viewed and interpreted by myself, my impression as follows:  Findings: Callus formation noted at the lateral aspect of the distal fibula   Bony lesion: no  Soft tissues: subnormal  Joint spaces: within normal limits  Hardware appropriately positioned: not  applicable       Assessment/Plan   Assessment:    1. Avulsion fracture of distal end of fibula    2. Sprain of left ankle, unspecified ligament, initial encounter          Plan:   Orders Placed This Encounter   Procedures   • Ambulatory Referral to Physical Therapy Evaluate and treat (ankle sprain, left lateral malleolus avulsion fracture (8 weeks ago))      • Discussion of any imaging in detail. Discussion of orthopaedic goals.  • Risk, benefits, and merits of treatment alternatives reviewed with the patient. Treatment alternatives include: bracing and PT referral. Advised her to use the ASO brace while working.  • Ice, heat, and/or modalities as beneficial  • Patient is encouraged to call or return for any issues or concerns.  • Follow up in 4 weeks         Garry Coronado PA-C  7/7/2021   Electronically signed by Garry Coronado PA-C, 07/15/21, 7:58 PM EDT.     EMR Dragon/Transcription disclaimer:  Much of this encounter note is an electronic transcription/translation of spoken language to printed text. The electronic translation of spoken language may permit erroneous, or at times, nonsensical words or phrases to be inadvertently transcribed; Although I have reviewed the note for such errors, some may still exist.

## 2021-09-27 ENCOUNTER — APPOINTMENT (OUTPATIENT)
Dept: MAMMOGRAPHY | Facility: HOSPITAL | Age: 56
End: 2021-09-27

## 2021-09-30 ENCOUNTER — APPOINTMENT (OUTPATIENT)
Dept: MAMMOGRAPHY | Facility: HOSPITAL | Age: 56
End: 2021-09-30

## 2021-10-05 ENCOUNTER — HOSPITAL ENCOUNTER (OUTPATIENT)
Dept: MAMMOGRAPHY | Facility: HOSPITAL | Age: 56
Discharge: HOME | End: 2021-10-05
Admitting: RADIOLOGY

## 2021-10-05 DIAGNOSIS — Z12.31 BREAST CANCER SCREENING BY MAMMOGRAM: ICD-10-CM

## 2021-10-05 PROCEDURE — 77063 BREAST TOMOSYNTHESIS BI: CPT

## 2021-10-05 PROCEDURE — 77067 SCR MAMMO BI INCL CAD: CPT

## 2021-11-05 ENCOUNTER — CLINICAL SUPPORT (OUTPATIENT)
Dept: FAMILY MEDICINE CLINIC | Age: 56
End: 2021-11-05

## 2021-11-05 DIAGNOSIS — Z23 NEED FOR INFLUENZA VACCINATION: Primary | ICD-10-CM

## 2021-11-05 PROCEDURE — 90471 IMMUNIZATION ADMIN: CPT | Performed by: FAMILY MEDICINE

## 2021-11-05 PROCEDURE — 90686 IIV4 VACC NO PRSV 0.5 ML IM: CPT | Performed by: FAMILY MEDICINE

## 2022-01-03 ENCOUNTER — TRANSCRIBE ORDERS (OUTPATIENT)
Dept: LAB | Facility: SURGERY CENTER | Age: 57
End: 2022-01-03

## 2022-01-03 DIAGNOSIS — Z01.818 OTHER SPECIFIED PRE-OPERATIVE EXAMINATION: Primary | ICD-10-CM

## 2022-01-06 NOTE — SIGNIFICANT NOTE
Education provided the Patient on the following:    - Nothing to Eat or Drink after MN the night before the procedure  -Your required COVID Test is Scheduled on *1/8/2022Between the Hours of [id number]  -You will only be notified if your COVID test Result is POSITIVE  -The importance of reducing your number of contacts by self quarantining after you COVID test until the date of your Surgery  -You will need to have someone drive you home after your Surgery and remain with you for 24 hours after the Procedure  - The date of your procedure, your are welcome to have one visitor at bedside or remain within 10-15 minutes of [facility]  -Please wear warm socks when you arrive for your Surgery  -Remove all jewelry and leave any valuables before arriving on the date of your procedure (all will have to be removed before leaving preop)  -You will need to arrive at 0600 on 1/11/2022Surgery  -Feel free to contact us at: [phone] with any additional questions/concerns

## 2022-01-08 ENCOUNTER — LAB (OUTPATIENT)
Dept: LAB | Facility: SURGERY CENTER | Age: 57
End: 2022-01-08

## 2022-01-08 DIAGNOSIS — Z01.818 OTHER SPECIFIED PRE-OPERATIVE EXAMINATION: ICD-10-CM

## 2022-01-08 LAB — SARS-COV-2 ORF1AB RESP QL NAA+PROBE: DETECTED

## 2022-01-08 PROCEDURE — C9803 HOPD COVID-19 SPEC COLLECT: HCPCS

## 2022-01-08 PROCEDURE — U0004 COV-19 TEST NON-CDC HGH THRU: HCPCS

## 2022-01-10 ENCOUNTER — TRANSCRIBE ORDERS (OUTPATIENT)
Dept: LAB | Facility: SURGERY CENTER | Age: 57
End: 2022-01-10

## 2022-01-10 DIAGNOSIS — Z01.818 OTHER SPECIFIED PRE-OPERATIVE EXAMINATION: Primary | ICD-10-CM

## 2022-01-11 RX ORDER — TRAZODONE HYDROCHLORIDE 150 MG/1
TABLET ORAL
Qty: 90 TABLET | Refills: 0 | Status: SHIPPED | OUTPATIENT
Start: 2022-01-11 | End: 2023-01-10 | Stop reason: SDUPTHER

## 2022-01-12 ENCOUNTER — TRANSCRIBE ORDERS (OUTPATIENT)
Dept: ADMINISTRATIVE | Facility: HOSPITAL | Age: 57
End: 2022-01-12

## 2022-01-12 DIAGNOSIS — N95.1 MENOPAUSAL STATE: Primary | ICD-10-CM

## 2022-01-12 DIAGNOSIS — Z12.31 SCREENING MAMMOGRAM, ENCOUNTER FOR: Primary | ICD-10-CM

## 2022-01-22 ENCOUNTER — LAB (OUTPATIENT)
Dept: LAB | Facility: SURGERY CENTER | Age: 57
End: 2022-01-22

## 2022-01-22 DIAGNOSIS — Z01.818 OTHER SPECIFIED PRE-OPERATIVE EXAMINATION: ICD-10-CM

## 2022-01-22 LAB — SARS-COV-2 ORF1AB RESP QL NAA+PROBE: NOT DETECTED

## 2022-01-22 PROCEDURE — C9803 HOPD COVID-19 SPEC COLLECT: HCPCS

## 2022-01-22 PROCEDURE — U0004 COV-19 TEST NON-CDC HGH THRU: HCPCS

## 2022-01-24 NOTE — OP NOTE
Procedure Report    Patient Name:  Denia Wooten  YOB: 1965    Date of Surgery:  1/25/2022     Indications:  Pain with ambulation    Pre-op Diagnosis:   HAV R       Post-Op Diagnosis Codes:   HAV R    Procedure/CPT® Codes:      Procedure(s):  BUNIONECTOMY RIGHT FOOT WITH OSTEOTOMY    Staff:  Surgeon(s):  Fabian Liao DPM         Anesthesia: MAC with Local    Estimated Blood Loss: Minimal    Implants:    * No surgical log found *    Specimen:          None        Findings: See Op report    Complications: None    Description of Procedure: The patient was brought into the operating room and placed on the operating room table in a supine position the patient was then placed under monitored anesthesia. A local block consisting of 20cc of a 1 to 1 mixture of 1% lidocaine plain and 0.5% Marcaine plain was then locally infiltrated.   The foot was then scrubbed prepped and draped in the usual aseptic manner. The foot was exsanguinated and the pneumatic ankle tourniquet was then inflated. Our attention was then directed to the right foot where a 5 cm dorsal linear incision was created over the first MPJ. The incision was then deepened through the subcutaneous tissue all vital neural and vascular structures were retractedm, all bleeders were cauterized and ligated as necessary. A T type capsulotomy was then performed. The head of the first metatarsal was then exposed. The medial eminence was then resected using a sagittal bone saw. Our attention was then directed to the first inner space via the original skin incision. The fibular sesamoid was then freed dorsally and distally the lateral contracture was assessed and noted to be reduced. Our attention was then redirected to the head of the first metatarsal where using a K wire as an apical osteotomy guide a [employer] type osteotomy was then created. The capital fragment was distracted, shifted laterally into a corrected position and impacted upon the  first metatarsal. It was then temporarily fixated with a 0.045 inch k wire permanently fixated with a 2.7 mm cortical bone screw. Excellent compression and reduction was obtained. The remaining medial bone shelf was then removed using a sagittal bone saw all rough edges were smoothed with a rasp. Redundant medial capsule was then resected. The area was then flushed with copious amounts of normal Saline solution. The capsule was then repaired with 3-0 Vicryl the skin was repaired with 5-0 proline. The area was then dressed with a dry sterile dressing. The pneumatic ankle tourniquet was released and a prompt vascular response was noted in the digits. Patient will be allowed to partial weight bear with a postoperative shoe and follow up with myself in the office in one week.    alonso Liao DPM     Date: 1/24/2022  Time: 13:52 EST

## 2022-01-24 NOTE — H&P
Patient Care Team:  Mariel Lima MD as PCP - General (Family Medicine)  Devonte Mendez MD as Consulting Physician (Cardiology)    Chief complaint Bunion r    Subjective     Patient is a 56 y.o. female presents with painfull bunion right      Review of Systems   Review of Systems   Constitutional: Negative.    HENT: Negative.    Eyes: Negative.    Respiratory: Negative.    Cardiovascular: Negative.    Gastrointestinal: Negative.    Endocrine: Negative.    Genitourinary: Negative.    Musculoskeletal: Negative.    Skin: Negative.    Allergic/Immunologic: Negative.    Neurological: Negative.    Hematological: Negative.    Psychiatric/Behavioral: Negative.        History  Past Medical History:   Diagnosis Date   • Abnormal chest xray     decreased lung volumes   • Allergic rhinitis    • Diabetes mellitus type 2 in obese (HCC)     borderline, stopped Metformin 2 months ago.     • Dyspepsia    • Dyspnea on exertion    • Fatigue    • Hypercholesteremia     per prim note, no meds   • Hypertension    • Insomnia    • Joint pain    • Nephrolithiasis    • Normocytic anemia     12.4/37.2   • Obesity    • Obstructive sleep apnea     CPAP says noncompliant - marlin her/claustrophobic   • Osteoarthritis of both knees     per prim.  On diclofenac daily, prn Aleve   • Peripheral edema    • Restless leg syndrome        Objective     Vital Signs       Physical Exam:    Physical Exam  HENT:      Head: Normocephalic and atraumatic.   Cardiovascular:      Rate and Rhythm: Normal rate and regular rhythm.   Pulmonary:      Effort: Pulmonary effort is normal.      Breath sounds: Normal breath sounds.   Musculoskeletal:         General: Normal range of motion.      Cervical back: Normal range of motion and neck supple.   Feet:      Comments: + HAV R, with pain over the 1st MPJ  Skin:     General: Skin is warm and dry.      Capillary Refill: Capillary refill takes less than 2 seconds.   Neurological:      General: No focal  deficit present.      Mental Status: She is alert and oriented to person, place, and time.         Results Review:   I reviewed the patient's new clinical results.  I reviewed the patient's new imaging results and agree with the interpretation.  I reviewed the patient's other test results and agree with the interpretation    Assessment/Plan       * No active hospital problems. *      **    I discussed the patients findings and my recommendations with patient.     Fabian Liao DPM  01/24/22  13:16 EST

## 2022-01-25 ENCOUNTER — ANESTHESIA EVENT (OUTPATIENT)
Dept: SURGERY | Facility: SURGERY CENTER | Age: 57
End: 2022-01-25

## 2022-01-25 ENCOUNTER — ANESTHESIA (OUTPATIENT)
Dept: SURGERY | Facility: SURGERY CENTER | Age: 57
End: 2022-01-25

## 2022-01-25 ENCOUNTER — HOSPITAL ENCOUNTER (OUTPATIENT)
Facility: SURGERY CENTER | Age: 57
Setting detail: HOSPITAL OUTPATIENT SURGERY
Discharge: HOME | End: 2022-01-25
Attending: PODIATRIST | Admitting: PODIATRIST

## 2022-01-25 VITALS
WEIGHT: 216 LBS | RESPIRATION RATE: 18 BRPM | BODY MASS INDEX: 38.27 KG/M2 | DIASTOLIC BLOOD PRESSURE: 78 MMHG | OXYGEN SATURATION: 97 % | HEART RATE: 57 BPM | TEMPERATURE: 97.5 F | HEIGHT: 63 IN | SYSTOLIC BLOOD PRESSURE: 134 MMHG

## 2022-01-25 PROCEDURE — 0QSN04Z REPOSITION RIGHT METATARSAL WITH INTERNAL FIXATION DEVICE, OPEN APPROACH: ICD-10-PCS | Performed by: PODIATRIST

## 2022-01-25 PROCEDURE — 25010000002 MIDAZOLAM PER 1 MG: Performed by: ANESTHESIOLOGY

## 2022-01-25 PROCEDURE — 0 LIDOCAINE 1 % SOLUTION: Performed by: PODIATRIST

## 2022-01-25 PROCEDURE — 28296 COR HLX VLGS DSTL MTAR OSTEO: CPT | Performed by: PODIATRIST

## 2022-01-25 PROCEDURE — 25010000002 MIDAZOLAM PER 1MG: Performed by: ANESTHESIOLOGY

## 2022-01-25 PROCEDURE — 0 CEFAZOLIN SODIUM-DEXTROSE 2-3 GM-%(50ML) RECONSTITUTED SOLUTION: Performed by: ANESTHESIOLOGY

## 2022-01-25 PROCEDURE — C1713 ANCHOR/SCREW BN/BN,TIS/BN: HCPCS | Performed by: PODIATRIST

## 2022-01-25 PROCEDURE — 25010000002 FENTANYL CITRATE (PF) 250 MCG/5ML SOLUTION: Performed by: ANESTHESIOLOGY

## 2022-01-25 PROCEDURE — 25010000002 PROPOFOL 10 MG/ML EMULSION: Performed by: ANESTHESIOLOGY

## 2022-01-25 DEVICE — SCRW CORT S/TAP 2.7X16MM: Type: IMPLANTABLE DEVICE | Site: FOOT | Status: FUNCTIONAL

## 2022-01-25 RX ORDER — LIDOCAINE HYDROCHLORIDE 10 MG/ML
INJECTION, SOLUTION INFILTRATION; PERINEURAL AS NEEDED
Status: DISCONTINUED | OUTPATIENT
Start: 2022-01-25 | End: 2022-01-25 | Stop reason: HOSPADM

## 2022-01-25 RX ORDER — FENTANYL CITRATE 50 UG/ML
50 INJECTION, SOLUTION INTRAMUSCULAR; INTRAVENOUS
Status: DISCONTINUED | OUTPATIENT
Start: 2022-01-25 | End: 2022-01-25 | Stop reason: HOSPADM

## 2022-01-25 RX ORDER — LIDOCAINE HYDROCHLORIDE 20 MG/ML
INJECTION, SOLUTION INFILTRATION; PERINEURAL AS NEEDED
Status: DISCONTINUED | OUTPATIENT
Start: 2022-01-25 | End: 2022-01-25 | Stop reason: SURG

## 2022-01-25 RX ORDER — SODIUM CHLORIDE, SODIUM LACTATE, POTASSIUM CHLORIDE, CALCIUM CHLORIDE 600; 310; 30; 20 MG/100ML; MG/100ML; MG/100ML; MG/100ML
1000 INJECTION, SOLUTION INTRAVENOUS CONTINUOUS
Status: DISCONTINUED | OUTPATIENT
Start: 2022-01-25 | End: 2022-01-25 | Stop reason: HOSPADM

## 2022-01-25 RX ORDER — MIDAZOLAM HYDROCHLORIDE 1 MG/ML
INJECTION INTRAMUSCULAR; INTRAVENOUS AS NEEDED
Status: DISCONTINUED | OUTPATIENT
Start: 2022-01-25 | End: 2022-01-25 | Stop reason: SURG

## 2022-01-25 RX ORDER — MAGNESIUM HYDROXIDE 1200 MG/15ML
LIQUID ORAL AS NEEDED
Status: DISCONTINUED | OUTPATIENT
Start: 2022-01-25 | End: 2022-01-25 | Stop reason: HOSPADM

## 2022-01-25 RX ORDER — BUPIVACAINE HYDROCHLORIDE 5 MG/ML
INJECTION, SOLUTION PERINEURAL AS NEEDED
Status: DISCONTINUED | OUTPATIENT
Start: 2022-01-25 | End: 2022-01-25 | Stop reason: HOSPADM

## 2022-01-25 RX ORDER — SODIUM CHLORIDE, SODIUM LACTATE, POTASSIUM CHLORIDE, CALCIUM CHLORIDE 600; 310; 30; 20 MG/100ML; MG/100ML; MG/100ML; MG/100ML
9 INJECTION, SOLUTION INTRAVENOUS CONTINUOUS
Status: DISCONTINUED | OUTPATIENT
Start: 2022-01-25 | End: 2022-01-25 | Stop reason: HOSPADM

## 2022-01-25 RX ORDER — DIPHENHYDRAMINE HYDROCHLORIDE 50 MG/ML
12.5 INJECTION INTRAMUSCULAR; INTRAVENOUS
Status: DISCONTINUED | OUTPATIENT
Start: 2022-01-25 | End: 2022-01-25 | Stop reason: HOSPADM

## 2022-01-25 RX ORDER — PROPOFOL 10 MG/ML
VIAL (ML) INTRAVENOUS AS NEEDED
Status: DISCONTINUED | OUTPATIENT
Start: 2022-01-25 | End: 2022-01-25 | Stop reason: SURG

## 2022-01-25 RX ORDER — CEFAZOLIN SODIUM 2 G/50ML
SOLUTION INTRAVENOUS AS NEEDED
Status: DISCONTINUED | OUTPATIENT
Start: 2022-01-25 | End: 2022-01-25 | Stop reason: SURG

## 2022-01-25 RX ORDER — SODIUM CHLORIDE 0.9 % (FLUSH) 0.9 %
10 SYRINGE (ML) INJECTION AS NEEDED
Status: DISCONTINUED | OUTPATIENT
Start: 2022-01-25 | End: 2022-01-25 | Stop reason: HOSPADM

## 2022-01-25 RX ORDER — FLUMAZENIL 0.1 MG/ML
0.2 INJECTION INTRAVENOUS AS NEEDED
Status: DISCONTINUED | OUTPATIENT
Start: 2022-01-25 | End: 2022-01-25 | Stop reason: HOSPADM

## 2022-01-25 RX ORDER — DIPHENHYDRAMINE HCL 25 MG
25 TABLET ORAL
Status: DISCONTINUED | OUTPATIENT
Start: 2022-01-25 | End: 2022-01-25 | Stop reason: HOSPADM

## 2022-01-25 RX ORDER — IBUPROFEN 200 MG
600 TABLET ORAL ONCE AS NEEDED
Status: DISCONTINUED | OUTPATIENT
Start: 2022-01-25 | End: 2022-01-25 | Stop reason: HOSPADM

## 2022-01-25 RX ORDER — LIDOCAINE HYDROCHLORIDE 10 MG/ML
0.5 INJECTION, SOLUTION INFILTRATION; PERINEURAL ONCE AS NEEDED
Status: DISCONTINUED | OUTPATIENT
Start: 2022-01-25 | End: 2022-01-25 | Stop reason: HOSPADM

## 2022-01-25 RX ORDER — FENTANYL CITRATE 50 UG/ML
INJECTION, SOLUTION INTRAMUSCULAR; INTRAVENOUS AS NEEDED
Status: DISCONTINUED | OUTPATIENT
Start: 2022-01-25 | End: 2022-01-25 | Stop reason: SURG

## 2022-01-25 RX ORDER — NALOXONE HCL 0.4 MG/ML
0.2 VIAL (ML) INJECTION AS NEEDED
Status: DISCONTINUED | OUTPATIENT
Start: 2022-01-25 | End: 2022-01-25 | Stop reason: HOSPADM

## 2022-01-25 RX ORDER — MIDAZOLAM HYDROCHLORIDE 1 MG/ML
1 INJECTION INTRAMUSCULAR; INTRAVENOUS
Status: DISCONTINUED | OUTPATIENT
Start: 2022-01-25 | End: 2022-01-25 | Stop reason: HOSPADM

## 2022-01-25 RX ADMIN — FAMOTIDINE 20 MG: 10 INJECTION INTRAVENOUS at 07:17

## 2022-01-25 RX ADMIN — FENTANYL CITRATE 25 MCG: 50 INJECTION, SOLUTION INTRAMUSCULAR; INTRAVENOUS at 08:26

## 2022-01-25 RX ADMIN — SODIUM CHLORIDE, POTASSIUM CHLORIDE, SODIUM LACTATE AND CALCIUM CHLORIDE 1000 ML: 600; 310; 30; 20 INJECTION, SOLUTION INTRAVENOUS at 06:49

## 2022-01-25 RX ADMIN — LIDOCAINE HYDROCHLORIDE 60 MG: 20 INJECTION, SOLUTION INFILTRATION; PERINEURAL at 07:44

## 2022-01-25 RX ADMIN — FENTANYL CITRATE 25 MCG: 50 INJECTION, SOLUTION INTRAMUSCULAR; INTRAVENOUS at 07:40

## 2022-01-25 RX ADMIN — MIDAZOLAM HYDROCHLORIDE 1 MG: 2 INJECTION, SOLUTION INTRAMUSCULAR; INTRAVENOUS at 07:17

## 2022-01-25 RX ADMIN — MIDAZOLAM 1 MG: 1 INJECTION INTRAMUSCULAR; INTRAVENOUS at 07:40

## 2022-01-25 RX ADMIN — PROPOFOL 40 MG: 10 INJECTION, EMULSION INTRAVENOUS at 07:44

## 2022-01-25 RX ADMIN — CEFAZOLIN SODIUM 2 G: 2 SOLUTION INTRAVENOUS at 07:46

## 2022-01-25 RX ADMIN — PROPOFOL 100 MCG/KG/MIN: 10 INJECTION, EMULSION INTRAVENOUS at 07:42

## 2022-01-25 NOTE — ANESTHESIA POSTPROCEDURE EVALUATION
Patient: Denia Wooten    Procedure Summary     Date: 01/25/22 Room / Location: [facility] / [facility]    Anesthesia Start: 0736 Anesthesia Stop: 0839    Procedure: BUNIONECTOMY RIGHT FOOT WITH OSTEOTOMY (Right Foot) Diagnosis:       Acquired hallux valgus of right foot      (Acquired hallux valgus of right foot [M20.11])    Surgeons: Fabian Liao DPM Provider: Jeff Nice DO    Anesthesia Type: MAC ASA Status: 2          Anesthesia Type: MAC    Vitals  Vitals Value Taken Time   /78 01/25/22 0900   Temp 36.4 °C (97.5 °F) 01/25/22 0837   Pulse 57 01/25/22 0900   Resp 18 01/25/22 0900   SpO2 97 % 01/25/22 0900           Post Anesthesia Care and Evaluation    Patient location during evaluation: PHASE II  Anesthetic complications: No anesthetic complications

## 2022-01-25 NOTE — ANESTHESIA PREPROCEDURE EVALUATION
Anesthesia Evaluation     Patient summary reviewed   no history of anesthetic complications:  NPO Solid Status: > 8 hours  NPO Liquid Status: > 2 hours           Airway   Mallampati: II  TM distance: >3 FB  Neck ROM: full  No difficulty expected  Dental - normal exam     Pulmonary     breath sounds clear to auscultation  (+) a smoker Former, sleep apnea (Non compliant),   (-) shortness of breath, recent URI  Cardiovascular   Exercise tolerance: good (4-7 METS)    Rhythm: regular  Rate: normal    (+) hypertension (no meds), hyperlipidemia (no meds),   (-) past MI, dysrhythmias, angina      Neuro/Psych  (-) seizures, CVA  GI/Hepatic/Renal/Endo    (+) morbid obesity,  renal disease (h/o) stones, diabetes mellitus (no meds) type 2,     Musculoskeletal     Abdominal    Substance History      OB/GYN          Other   arthritis,    history of cancer (L breast)    ROS/Med Hx Other: COVID+ Jan8, now not detected                  Anesthesia Plan    ASA 2     MAC   (D/w pt potential need for GA)    Anesthetic plan, all risks, benefits, and alternatives have been provided, discussed and informed consent has been obtained with: patient.        CODE STATUS:

## 2022-01-25 NOTE — DISCHARGE INSTRUCTIONS
Patient is to rest, ice and elevate the area 10-15 minutes every hour while awake.  Minimal protected WB with the cam walker that the pt has with her.  Keep the area clean and dry and follow up in the office in 1 week

## 2022-06-23 ENCOUNTER — OFFICE VISIT (OUTPATIENT)
Dept: FAMILY MEDICINE CLINIC | Age: 57
End: 2022-06-23

## 2022-06-23 VITALS
HEIGHT: 63 IN | SYSTOLIC BLOOD PRESSURE: 151 MMHG | WEIGHT: 221 LBS | TEMPERATURE: 98 F | OXYGEN SATURATION: 96 % | BODY MASS INDEX: 39.16 KG/M2 | DIASTOLIC BLOOD PRESSURE: 78 MMHG | HEART RATE: 64 BPM

## 2022-06-23 DIAGNOSIS — R05.9 COUGH: ICD-10-CM

## 2022-06-23 DIAGNOSIS — I10 PRIMARY HYPERTENSION: ICD-10-CM

## 2022-06-23 DIAGNOSIS — R09.81 NASAL CONGESTION: ICD-10-CM

## 2022-06-23 DIAGNOSIS — J01.00 ACUTE NON-RECURRENT MAXILLARY SINUSITIS: Primary | ICD-10-CM

## 2022-06-23 PROBLEM — L60.2 ONYCHOGRYPHOSIS: Status: ACTIVE | Noted: 2022-06-23

## 2022-06-23 PROBLEM — M20.10 ACQUIRED HALLUX VALGUS: Status: ACTIVE | Noted: 2022-06-23

## 2022-06-23 PROBLEM — M20.42 ACQUIRED HAMMER TOE OF LEFT FOOT: Status: ACTIVE | Noted: 2022-06-23

## 2022-06-23 LAB
EXPIRATION DATE: NORMAL
FLUAV AG UPPER RESP QL IA.RAPID: NOT DETECTED
FLUBV AG UPPER RESP QL IA.RAPID: NOT DETECTED
INTERNAL CONTROL: NORMAL
Lab: NORMAL
SARS-COV-2 AG UPPER RESP QL IA.RAPID: NOT DETECTED

## 2022-06-23 PROCEDURE — 99213 OFFICE O/P EST LOW 20 MIN: CPT | Performed by: NURSE PRACTITIONER

## 2022-06-23 PROCEDURE — 87428 SARSCOV & INF VIR A&B AG IA: CPT | Performed by: NURSE PRACTITIONER

## 2022-06-23 RX ORDER — CETIRIZINE HYDROCHLORIDE 10 MG/1
10 TABLET ORAL DAILY
COMMUNITY
End: 2022-11-28

## 2022-06-23 RX ORDER — DEXTROMETHORPHAN HYDROBROMIDE AND PROMETHAZINE HYDROCHLORIDE 15; 6.25 MG/5ML; MG/5ML
5 SYRUP ORAL 4 TIMES DAILY PRN
Qty: 118 ML | Refills: 0 | Status: SHIPPED | OUTPATIENT
Start: 2022-06-23 | End: 2022-11-28

## 2022-06-23 RX ORDER — AZITHROMYCIN 250 MG/1
TABLET, FILM COATED ORAL
Qty: 6 TABLET | Refills: 0 | Status: SHIPPED | OUTPATIENT
Start: 2022-06-23 | End: 2022-11-28

## 2022-06-23 NOTE — ASSESSMENT & PLAN NOTE
Hypertension is elevated today due to illness.  Dietary sodium restriction.  Weight loss.  Regular aerobic exercise.  Ambulatory blood pressure monitoring.  Blood pressure will be reassessed at the next regular appointment.   no

## 2022-06-23 NOTE — PROGRESS NOTES
"Chief Complaint  Cough, Nasal Congestion, and Headache    Subjective    {Problem List  Visit Diagnosis   Encounters  Notes  Medications  Labs  Result Review Imaging  Media :23}    Denia Wooten presents to [facility]  Cough  This is a new problem. The current episode started in the past 7 days. The problem has been gradually worsening. The cough is productive of sputum. Associated symptoms include headaches and nasal congestion. Pertinent negatives include no chills, fever or shortness of breath. The symptoms are aggravated by pollens. She has tried OTC cough suppressant for the symptoms. The treatment provided mild relief. Her past medical history is significant for environmental allergies.     Past Medical History:   Diagnosis Date   • Abnormal chest xray     decreased lung volumes   • Allergic rhinitis    • Cancer (HCC) 2019    breast stage 1   • Diabetes mellitus type 2 in obese (HCC)     borderline, stopped Metformin 2 months ago.     • Dyspepsia    • Dyspnea on exertion    • Fatigue    • Hypercholesteremia     per prim note, no meds   • Hypertension    • Insomnia    • Joint pain    • Nephrolithiasis    • Normocytic anemia     12.4/37.2   • Obesity    • Obstructive sleep apnea     CPAP says noncompliant - marlin her/claustrophobic   • Osteoarthritis of both knees     per prim.  On diclofenac daily, prn Aleve   • Peripheral edema    • Restless leg syndrome        Objective   Vital Signs:  /78   Pulse 64   Temp 98 °F (36.7 °C) (Oral)   Ht 160 cm (63\")   Wt 100 kg (221 lb)   SpO2 96%   BMI 39.15 kg/m²   Estimated body mass index is 39.15 kg/m² as calculated from the following:    Height as of this encounter: 160 cm (63\").    Weight as of this encounter: 100 kg (221 lb).          Physical Exam  HENT:      Head: Normocephalic.      Nose: Congestion present.   Cardiovascular:      Rate and Rhythm: Normal rate and regular rhythm.   Pulmonary:      Effort: " Pulmonary effort is normal. No respiratory distress.      Breath sounds: No stridor. Examination of the left-upper field reveals rhonchi. Rhonchi present. No wheezing or rales.   Chest:      Chest wall: No tenderness.   Skin:     General: Skin is warm and dry.   Neurological:      Mental Status: She is alert and oriented to person, place, and time.   Psychiatric:         Mood and Affect: Mood normal.        Result Review :                Assessment and Plan   Diagnoses and all orders for this visit:    1. Acute non-recurrent maxillary sinusitis (Primary)  Comments:  Rapid covid and flu negative   Orders:  -     azithromycin (Zithromax Z-Richard) 250 MG tablet; Take 2 tablets by mouth on day 1, then 1 tablet daily on days 2-5  Dispense: 6 tablet; Refill: 0    2. Nasal congestion  -     POCT SARS-CoV-2 Antigen DEONNA + Flu    3. Cough  -     promethazine-dextromethorphan (PROMETHAZINE-DM) 6.25-15 MG/5ML syrup; Take 5 mL by mouth 4 (Four) Times a Day As Needed for Cough.  Dispense: 118 mL; Refill: 0    4. Primary hypertension  Assessment & Plan:  Hypertension is elevated today due to illness.  Dietary sodium restriction.  Weight loss.  Regular aerobic exercise.  Ambulatory blood pressure monitoring.  Blood pressure will be reassessed at the next regular appointment.             Follow Up   Return if symptoms worsen or fail to improve.  Patient was given instructions and counseling regarding her condition or for health maintenance advice. Please see specific information pulled into the AVS if appropriate.

## 2022-07-12 NOTE — H&P
"            Create Note         My Note 1:15 PM   Edit           Expand All Collapse All    [facility] Bariatric Surgery  [address]  [place], located within [facility] [address] [phone]           Patient Name: Denia Wooten.  : 1965        Date of Visit: 2017        Reason for Visit:  Reevaluation - pursuing revision. Preop LSG.     HPI: Denia Wooten is a 51 y.o. female s/p LAGB APS  GDW, replaced w/ APL  GDW for band slip, followed by AGBR 2016 d/t band intolerance w/ chronic dysphagia/N/V. All issues resolved w/ band removal, but unfortunately she remains \"starved\" all the time and is really struggling w/ portion control. She gained 24 lbs the 1st 3 months following band removal. She is really working on making healthy choices but feels it is hopeless. Eating 3 meals/day w/ 2 snacks. Drinking water and tea/coffee w/ sweetener. Bought an ellipitical but has only been able to use it once/week d/t worsening knee pain. 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      Returns for final visit prior to LSG. Says no probs w [facility] .         Medical History          Past Medical History   Diagnosis Date   • Abnormal chest xray         decreased lung volumes   • Allergic rhinitis     • Diabetes mellitus         borderline   • Dyspepsia     • Dyspnea on exertion     • Fatigue     • Hypercholesteremia         per prim note, no meds   • Hypertension         Meds in the past. 171/92 today   • Insomnia     • Joint pain     • Nephrolithiasis     • Normocytic anemia         12.4/37.2   • Obesity     • Obstructive sleep apnea         CPAP says noncompliant - marlin her/claustrophobic   • Osteoarthritis of both knees         per prim. On diclofenac daily, prn Aleve   • Peripheral edema     • Restless leg syndrome            Surgical History           Past Surgical History " Addended byLam Sahu on: 7/12/2022 02:25 PM     Modules accepted: Orders "  Procedure Laterality Date   • Colonoscopy   2014       unremarkable   • Gastric banding           s/p LAGB APS 2007 GDW, replaced w/ APL 2010 GDW for band slip, followed by AGBR 9/2016 d/t band intolerance.   • Kidney stone surgery   2013          Active Medications    No outpatient prescriptions have been marked as taking for the 1/31/17 encounter (Consult) with Johnathan Burkett MD.              Allergies   Allergen Reactions   • Sulfa Antibiotics Rash            Family History   Problem Relation Age of Onset   • Hypertension Mother     • Diabetes Mother     • Hypertension Father     • Sleep apnea Father     • Stroke Father     • Cancer Maternal Grandmother     • Cancer Maternal Grandfather     • Hypertension Maternal Grandfather     • Diabetes Maternal Grandfather     • Stroke Maternal Grandfather     • Heart attack Paternal Grandfather            Social History    Social History            Social History   • Marital status:        Spouse name: N/A   • Number of children: N/A   • Years of education: N/A           Occupational History   •  - walking route                Social History Main Topics   • Smoking status: Former Smoker       Packs/day: 1.50       Years: 25.00       Quit date: 2001   • Smokeless tobacco: Never Used   • Alcohol use Yes          Comment: wine on occasion   • Drug use: No   • Sexual activity: Not on file         Comment:            Other Topics Concern   • Not on file          Social History Narrative     Lives in [place], [place] w/ .                 Visit Vitals   • /92 (BP Location: Left arm, Patient Position: Sitting)   • Pulse 70   • Temp 98.1 °F (36.7 °C)   • Ht 63\" (160 cm)   • Wt 229 lb 8 oz (104 kg)   • BMI 40.65 kg/m2         Physical Exam   Constitutional: She is oriented to person, place, and time. She appears well-developed and well-nourished.   HENT:   Head: Normocephalic and atraumatic.   Eyes: No scleral icterus.   Neck: Neck supple. " "Carotid bruit is not present. No thyromegaly present.   Cardiovascular: Normal rate and regular rhythm.   No murmur heard.  Pulmonary/Chest: Breath sounds normal. No respiratory distress. She has no wheezes. She has no rales.   Abdominal: Soft. Bowel sounds are normal. She exhibits no distension and no mass. There is no hepatosplenomegaly. There is no tenderness. No hernia.       Scars: old band, 3 AGBR scars   Musculoskeletal: Normal range of motion.   Neurological: She is alert and oriented to person, place, and time.   Skin: Skin is warm and dry.   Psychiatric: She has a normal mood and affect.   Vitals reviewed.           Assessment and Plan: 4 months s/p lapband removal d/t band intolerance. Struggling w/ weight regain and portion control, despite best efforts. Preop today to  revision to LSG.        Patient is aware that surgery is a tool, and that weight loss is not guaranteed but only seen in the context of appropriate use, follow up and exercise.     Complications  of laparoscopic/possible robotic gastric sleeve were discussed. The patient is well aware of the potential complications of surgery that include but not limited to bleeding, infections, deep venous thrombosis, pulmonary embolism, pulmonary complications such as pneumonia, cardiac events, hernias, small bowel obstruction, damage to the spleen or other organs, bowel injury, disfiguring scars, failure to lose weight, need for additional surgery, conversion to an open procedure, and death. Patient is also aware of complications which apply in this particular procedure that can include but are not limited to a \"leak\" at the staple line which in some instances may require conversion to gastric bypass.     Greater than 10 minutes was spent with the patient discussing avoiding all tobacco products and second hand smoke at least 2 weeks pre-operatively and 6 weeks post-operatively to minimize the risk of sleeve leak      Discussed the risks, benefits and " alternative therapies at great length as outlined in our extensive consent forms, consent videos, and educational teaching process under the direction of the center's .     A copy of the patient's signed informed consent is on file.     This will be the 4th surgery in the area (AGB, AGB replacement, AGBR, now LSG). Pt is aware that LSG after prev AGB/AGBR carries increased risk over primary LSG alone, samuel wrt bleeding, infection, leak, prolonged OR times with incr risk pulm complications and VTE, etc and still wishes to proceed

## 2022-10-06 ENCOUNTER — APPOINTMENT (OUTPATIENT)
Dept: BONE DENSITY | Facility: HOSPITAL | Age: 57
End: 2022-10-06

## 2022-10-06 ENCOUNTER — APPOINTMENT (OUTPATIENT)
Dept: MAMMOGRAPHY | Facility: HOSPITAL | Age: 57
End: 2022-10-06

## 2022-10-07 ENCOUNTER — APPOINTMENT (OUTPATIENT)
Dept: BONE DENSITY | Facility: HOSPITAL | Age: 57
End: 2022-10-07

## 2022-10-07 ENCOUNTER — APPOINTMENT (OUTPATIENT)
Dept: MAMMOGRAPHY | Facility: HOSPITAL | Age: 57
End: 2022-10-07

## 2022-11-03 ENCOUNTER — TELEPHONE (OUTPATIENT)
Dept: FAMILY MEDICINE CLINIC | Age: 57
End: 2022-11-03

## 2022-11-03 DIAGNOSIS — B00.1 COLD SORE: ICD-10-CM

## 2022-11-03 DIAGNOSIS — B00.1 COLD SORE: Primary | ICD-10-CM

## 2022-11-03 RX ORDER — ACYCLOVIR 50 MG/G
1 OINTMENT TOPICAL
Qty: 5 G | Refills: 0 | Status: SHIPPED | OUTPATIENT
Start: 2022-11-03 | End: 2022-11-04 | Stop reason: SDUPTHER

## 2022-11-03 NOTE — TELEPHONE ENCOUNTER
Caller: Denia Wooten    Relationship: Self    Best call back number: [phone]    What medication are you requesting: ACYCLOVIR OINTMENT     What are your current symptoms: PATIENT NOT CURRENTLY HAVE A FLAIR UP BUT IN THE FALL/WINTER SEASON SHE DOES AND SHE WOULD LIKE THE MEDICATION BEFORE A FLAIR UP.     Have you had these symptoms before:    [x] Yes  [] No    Have you been treated for these symptoms before:   [x] Yes  [] No    If a prescription is needed, what is your preferred pharmacy and phone number:      [facility] [id number] - [place], [place] - [address] [facility] - [phone] [facility] [phone]   [phone]

## 2022-11-04 RX ORDER — ACYCLOVIR 50 MG/G
1 OINTMENT TOPICAL
Qty: 5 G | Refills: 0 | Status: SHIPPED | OUTPATIENT
Start: 2022-11-04

## 2022-11-04 NOTE — TELEPHONE ENCOUNTER
Caller: Denia Wooten    Relationship: Self    Best call back number:    [phone]      What is the best time to reach you: ANY    Who are you requesting to speak with (clinical staff, provider,  specific staff member): CLINICAL STAFF    What was the call regarding: PATIENT CALLED STATING THAT [facility] DOES NOT HAVE ANY OF THE OINTMENT AND SHE WOULD LIKE TO KNOW IF WE CAN SEND THE PRESCRIPTION TO:     [facility] - [place] [address] [facility] [phone] [facility] [phone]   [phone]      Do you require a callback: YES

## 2022-11-08 ENCOUNTER — HOSPITAL ENCOUNTER (OUTPATIENT)
Dept: MAMMOGRAPHY | Facility: HOSPITAL | Age: 57
Discharge: HOME | End: 2022-11-08

## 2022-11-08 ENCOUNTER — HOSPITAL ENCOUNTER (OUTPATIENT)
Dept: BONE DENSITY | Facility: HOSPITAL | Age: 57
Discharge: HOME | End: 2022-11-08

## 2022-11-08 DIAGNOSIS — N95.1 MENOPAUSAL STATE: ICD-10-CM

## 2022-11-08 DIAGNOSIS — Z12.31 SCREENING MAMMOGRAM, ENCOUNTER FOR: ICD-10-CM

## 2022-11-08 PROCEDURE — 77063 BREAST TOMOSYNTHESIS BI: CPT

## 2022-11-08 PROCEDURE — 77080 DXA BONE DENSITY AXIAL: CPT

## 2022-11-08 PROCEDURE — 77067 SCR MAMMO BI INCL CAD: CPT

## 2022-11-10 ENCOUNTER — CLINICAL SUPPORT (OUTPATIENT)
Dept: FAMILY MEDICINE CLINIC | Age: 57
End: 2022-11-10

## 2022-11-10 DIAGNOSIS — Z23 IMMUNIZATION DUE: Primary | ICD-10-CM

## 2022-11-10 PROCEDURE — 90686 IIV4 VACC NO PRSV 0.5 ML IM: CPT | Performed by: FAMILY MEDICINE

## 2022-11-10 PROCEDURE — 90471 IMMUNIZATION ADMIN: CPT | Performed by: FAMILY MEDICINE

## 2022-11-28 ENCOUNTER — OFFICE VISIT (OUTPATIENT)
Dept: FAMILY MEDICINE CLINIC | Age: 57
End: 2022-11-28

## 2022-11-28 VITALS
DIASTOLIC BLOOD PRESSURE: 54 MMHG | HEIGHT: 63 IN | SYSTOLIC BLOOD PRESSURE: 134 MMHG | OXYGEN SATURATION: 96 % | BODY MASS INDEX: 39.27 KG/M2 | WEIGHT: 221.6 LBS | HEART RATE: 72 BPM

## 2022-11-28 DIAGNOSIS — Z12.11 COLON CANCER SCREENING: ICD-10-CM

## 2022-11-28 DIAGNOSIS — I10 PRIMARY HYPERTENSION: ICD-10-CM

## 2022-11-28 DIAGNOSIS — Z01.419 WELL WOMAN EXAM: Primary | ICD-10-CM

## 2022-11-28 DIAGNOSIS — Z11.59 ENCOUNTER FOR SCREENING FOR OTHER VIRAL DISEASES: ICD-10-CM

## 2022-11-28 DIAGNOSIS — D64.9 NORMOCYTIC ANEMIA: ICD-10-CM

## 2022-11-28 DIAGNOSIS — Z78.0 POSTMENOPAUSAL STATE: ICD-10-CM

## 2022-11-28 DIAGNOSIS — Z23 ENCOUNTER FOR IMMUNIZATION: ICD-10-CM

## 2022-11-28 DIAGNOSIS — E78.00 HYPERCHOLESTEREMIA: ICD-10-CM

## 2022-11-28 DIAGNOSIS — E66.3 OVERWEIGHT: ICD-10-CM

## 2022-11-28 DIAGNOSIS — Z12.31 ENCOUNTER FOR SCREENING MAMMOGRAM FOR BREAST CANCER: ICD-10-CM

## 2022-11-28 DIAGNOSIS — Z83.3 FAMILY HISTORY OF DIABETES MELLITUS: ICD-10-CM

## 2022-11-28 DIAGNOSIS — M20.12 ACQUIRED HALLUX VALGUS OF LEFT FOOT: ICD-10-CM

## 2022-11-28 LAB
BILIRUB BLD-MCNC: NEGATIVE MG/DL
CLARITY, POC: CLEAR
COLOR UR: YELLOW
DEVELOPER EXPIRATION DATE: NORMAL
DEVELOPER LOT NUMBER: NORMAL
EXPIRATION DATE: ABNORMAL
EXPIRATION DATE: NORMAL
FECAL OCCULT BLOOD SCREEN, POC: NEGATIVE
GLUCOSE UR STRIP-MCNC: NEGATIVE MG/DL
KETONES UR QL: NEGATIVE
LEUKOCYTE EST, POC: NEGATIVE
Lab: 1512
Lab: ABNORMAL
NEGATIVE CONTROL: NEGATIVE
NITRITE UR-MCNC: NEGATIVE MG/ML
PH UR: 7 [PH] (ref 5–8)
POSITIVE CONTROL: POSITIVE
PROT UR STRIP-MCNC: NEGATIVE MG/DL
RBC # UR STRIP: NEGATIVE /UL
SP GR UR: 1.02 (ref 1–1.03)
UROBILINOGEN UR QL: ABNORMAL

## 2022-11-28 PROCEDURE — G0123 SCREEN CERV/VAG THIN LAYER: HCPCS | Performed by: FAMILY MEDICINE

## 2022-11-28 PROCEDURE — 99396 PREV VISIT EST AGE 40-64: CPT | Performed by: FAMILY MEDICINE

## 2022-11-28 PROCEDURE — 91312 COVID-19 (PFIZER) BIVALENT BOOSTER 12+YRS: CPT | Performed by: FAMILY MEDICINE

## 2022-11-28 PROCEDURE — 82270 OCCULT BLOOD FECES: CPT | Performed by: FAMILY MEDICINE

## 2022-11-28 PROCEDURE — 81003 URINALYSIS AUTO W/O SCOPE: CPT | Performed by: FAMILY MEDICINE

## 2022-11-28 PROCEDURE — 0124A PR ADM SARSCOV2 30MCG/0.3ML BST: CPT | Performed by: FAMILY MEDICINE

## 2022-11-28 RX ORDER — AMOXICILLIN 500 MG/1
CAPSULE ORAL
COMMUNITY
Start: 2022-11-17 | End: 2022-11-28

## 2022-11-28 NOTE — PROGRESS NOTES
Denia presents for her WWE to [facility] Office     Chief Complaint:  WWE    Subjective       History of Present Illness:  MILLIE Nair presents with encounter for gynecological examination (general) (routine) without abnormal findings.  Her last physical exam was 1 year ago.  She is menopausal.  She is not currently using any form of contraception.  She performs breast self-exams occasionally.   Her last Pap smear was in 03- and was normal.   Her last mammogram was normal 11/8/22-has breast cancer DX s/p lumpectomy with radiation.   Her last DEXA was in 11/08/22 and was normal.  Last colonoscopy was 10/27/2016-grade 2 hemorrhoids.  Preventative Health updated today.  Joanie denies any history of abnormal Pap smears.  Tobacco: She has a past history of cigarette smoking; quit date:  01/2000.  Negative depression screen.    H/o breast cancer-she is on anastrazole.  She is seeing a specialist about correcting her breast asymmetry    She has chronic insomnia and she is stable on trazodone 150 mg daily         She presents with hyperlipidemia,  date of diagnosis 2011.  Current treatment includes a low cholesterol/low fat diet.  Compliance with treatment has been good; she takes her medication as directed and follows up as directed.  She denies experiencing any hypercholesterolemia related symptoms.           In regard to the essential (primary) hypertension, current nonpharmacologic treatment includes low sodium diet.  Her current cardiac medication regimen includes off BB.  Compliance with treatment has been good; she takes her medication as directed and follows up as directed.  She is tolerating the medication well without side effects.  She has not kept a blood pressure diary, but states that pressures have been okay.        Review of Systems:  Review of Systems   Constitutional: Negative for chills, fatigue and fever.   HENT: Negative for ear pain, sinus pressure and sore throat.    Eyes: Negative for  blurred vision and double vision.   Respiratory: Negative for cough, shortness of breath and wheezing.    Cardiovascular: Negative for chest pain and palpitations.   Gastrointestinal: Negative for abdominal pain, blood in stool, constipation, diarrhea, nausea and vomiting.   Skin: Negative for rash.   Neurological: Negative for dizziness and headache.   Psychiatric/Behavioral: Negative for depressed mood.        Objective   Medical History:  Past Medical History:   • Abnormal chest xray    decreased lung volumes   • Allergic rhinitis   • Cancer (HCC)    breast stage 1   • Diabetes mellitus type 2 in obese (HCC)    borderline, stopped Metformin 2 months ago.     • Dyspepsia   • Dyspnea on exertion   • Fatigue   • Hypercholesteremia    per prim note, no meds   • Hypertension   • Insomnia   • Joint pain   • Nephrolithiasis   • Normocytic anemia    12.4/37.2   • Obesity   • Obstructive sleep apnea    CPAP says noncompliant - marlin her/claustrophobic   • Osteoarthritis of both knees    per prim.  On diclofenac daily, prn Aleve   • Peripheral edema   • Restless leg syndrome     Past Surgical History:   • BILATERAL BREAST REDUCTION   • BREAST SURGERY    biopsy   • BUNIONECTOMY    Procedure: BUNIONECTOMY RIGHT FOOT WITH OSTEOTOMY;  Surgeon: Fabian Liao DPM;  Location[place] [facility];  Service: Podiatry;  Laterality: Right;   • COLONOSCOPY    unremarkable   • GASTRIC BANDING    s/p LAGB APS 2007 GDW, replaced w/ APL 2010 GDW for band slip, followed by AGBR 9/2016 d/t band intolerance.   • GASTRIC SLEEVE LAPAROSCOPIC    Procedure: GASTRIC SLEEVE LAPAROSCOPIC;  Surgeon: Johnathan Burkett MD;  Location:  [facility];  Service:    • KIDNEY STONE SURGERY   • WI LAP,ESOPHAGOGAST FUNDOPLASTY    Procedure: HIATAL HERNIA REPAIR LAPAROSCOPIC;  Surgeon: Johnathan Burkett MD;  Location:  [facility];  Service: Bariatric      Family History   Problem Relation Age of Onset   • Hypertension Mother    • Diabetes Mother    •  Hypertension Father    • Sleep apnea Father    • Stroke Father    • Alzheimer's disease Father    • Cancer Maternal Grandmother    • Stomach cancer Maternal Grandmother    • Alzheimer's disease Maternal Grandmother    • Cancer Maternal Grandfather    • Hypertension Maternal Grandfather    • Diabetes Maternal Grandfather    • Stroke Maternal Grandfather    • Colon cancer Maternal Grandfather    • Heart attack Paternal Grandfather      Social History     Tobacco Use   • Smoking status: Former     Packs/day: 1.50     Years: 25.00     Pack years: 37.50     Types: Cigarettes     Quit date:      Years since quittin.9   • Smokeless tobacco: Never   Substance Use Topics   • Alcohol use: Yes     Comment: wine on occasion       Health Maintenance Due   Topic Date Due   • HEPATITIS C SCREENING  Never done   • PAP SMEAR  Never done   • COVID-19 Vaccine (4 - Booster for Pfizer series) 2022   • LIPID PANEL  2022        Immunization History   Administered Date(s) Administered   • COVID-19 (PFIZER) PURPLE CAP 2021, 2021, 2021   • Flu Vaccine Intradermal Quad 18-64YR 2013   • FluLaval/Fluzone >6mos 2021, 11/10/2022   • Hepatitis A 2018   • Influenza LAIV (Nasal) 2017       Allergies   Allergen Reactions   • Neosporin Plus Max St Rash   • Sulfa Antibiotics Rash        Medications:  Current Outpatient Medications on File Prior to Visit   Medication Sig   • acyclovir (Zovirax) 5 % ointment Apply 1 application topically to the appropriate area as directed Every 3 (Three) Hours.   • anastrozole (ARIMIDEX) 1 MG tablet    • Multiple Vitamins-Minerals (CENTRUM ADULTS PO) Take 1 tablet by mouth Daily.   • naproxen sodium (ALEVE) 220 MG tablet Aleve 220 mg oral tablet take 1 tablet (220 mg) by oral route every 12 hours as needed   Active   • traZODone (DESYREL) 150 MG tablet TAKE ONE-HALF TO ONE TABLET BY MOUTH EVERY NIGHT AT BEDTIME AS NEEDED   • [DISCONTINUED] cholecalciferol  "(VITAMIN D3) 1000 UNITS tablet Take 1,000 Units by mouth Every Night.   • [DISCONTINUED] cyclobenzaprine (FLEXERIL) 10 MG tablet    • [DISCONTINUED] ibuprofen (ADVIL,MOTRIN) 800 MG tablet    • [DISCONTINUED] amoxicillin (AMOXIL) 500 MG capsule    • [DISCONTINUED] azithromycin (Zithromax Z-Richard) 250 MG tablet Take 2 tablets by mouth on day 1, then 1 tablet daily on days 2-5   • [DISCONTINUED] cetirizine (zyrTEC) 10 MG tablet Take 10 mg by mouth Daily.   • [DISCONTINUED] Melatonin 10 MG capsule melatonin 10 mg oral capsule QHS PRN   Active   • [DISCONTINUED] promethazine-dextromethorphan (PROMETHAZINE-DM) 6.25-15 MG/5ML syrup Take 5 mL by mouth 4 (Four) Times a Day As Needed for Cough.     No current facility-administered medications on file prior to visit.       Vital Signs:   /54 (BP Location: Left arm, Patient Position: Sitting, Cuff Size: Large Adult)   Pulse 72   Ht 160 cm (63\")   Wt 101 kg (221 lb 9.6 oz)   SpO2 96%   BMI 39.25 kg/m²       Physical Exam:  Physical Exam  Vitals and nursing note reviewed. Exam conducted with a chaperone present.   Constitutional:       General: She is not in acute distress.     Appearance: Normal appearance. She is normal weight. She is not ill-appearing, toxic-appearing or diaphoretic.   HENT:      Head: Normocephalic and atraumatic.      Right Ear: Tympanic membrane, ear canal and external ear normal. There is no impacted cerumen.      Left Ear: Tympanic membrane, ear canal and external ear normal. There is no impacted cerumen.      Nose: No congestion or rhinorrhea.      Mouth/Throat:      Mouth: Mucous membranes are moist.      Pharynx: Oropharynx is clear. No oropharyngeal exudate or posterior oropharyngeal erythema.   Eyes:      Extraocular Movements: Extraocular movements intact.      Conjunctiva/sclera: Conjunctivae normal.      Pupils: Pupils are equal, round, and reactive to light.   Neck:      Vascular: No carotid bruit.   Cardiovascular:      Rate and Rhythm: " Normal rate and regular rhythm.      Pulses: Normal pulses.      Heart sounds: Normal heart sounds. No murmur heard.  Pulmonary:      Effort: Pulmonary effort is normal.      Breath sounds: Normal breath sounds. No wheezing, rhonchi or rales.   Chest:   Breasts:     Mario Score is 5.      Right: Normal.      Left: Normal.   Abdominal:      General: Abdomen is flat. Bowel sounds are normal.      Palpations: Abdomen is soft.      Tenderness: There is no abdominal tenderness.   Genitourinary:     General: Normal vulva.      Vagina: Normal.      Cervix: Normal.      Uterus: Normal.       Adnexa: Right adnexa normal and left adnexa normal.      Rectum: Guaiac result negative. External hemorrhoid present. No mass, tenderness, anal fissure or internal hemorrhoid. Normal anal tone.   Musculoskeletal:         General: No swelling. Normal range of motion.      Cervical back: Neck supple. No rigidity or tenderness.   Lymphadenopathy:      Cervical: No cervical adenopathy.      Upper Body:      Right upper body: No axillary adenopathy.      Left upper body: No axillary adenopathy.   Skin:     General: Skin is warm and dry.   Neurological:      Mental Status: She is alert and oriented to person, place, and time.      Gait: Gait normal.   Psychiatric:         Mood and Affect: Mood normal.         Behavior: Behavior normal.         Judgment: Judgment normal.         Result Review      The following data was reviewed by Mariel Lima MD on 11/28/2022.  Lab Results   Component Value Date    WBC 5.07 09/24/2020    HGB 12.50 09/24/2020    HCT 37.3 09/24/2020    MCV 91.6 09/24/2020    .00 09/24/2020     Lab Results   Component Value Date    GLUCOSE 96 04/08/2021    BUN 20 04/08/2021    CREATININE 0.58 04/08/2021    EGFRIFNONA 103 07/03/2018    EGFRIFAFRI 119 07/03/2018    BCR 34 (H) 04/08/2021    K 4.5 04/08/2021    CO2 27 04/08/2021    CALCIUM 9.3 04/08/2021    PROTENTOTREF 7.7 07/03/2018    ALBUMIN 4.4 04/08/2021     LABIL2 1.8 04/08/2021    AST 23 04/08/2021    ALT 27 04/08/2021     Lab Results   Component Value Date    CHLPL 188 04/08/2021    TRIG 75 04/08/2021    HDL 53 04/08/2021     (H) 04/08/2021     No results found for: TSH  Lab Results   Component Value Date    HGBA1C 6.30 (H) 02/08/2017     No results found for: PSA                    Assessment and Plan:          Diagnoses and all orders for this visit:    1. Well woman exam (Primary)  Comments:  Pap performed, breast exam within normal limits.  I would recommend Dr. Arriaza to further work-up her breast asymmetry  Orders:  -     POCT urinalysis dipstick, automated  -     PAP, Liquid Based ([facility] Only); Future    2. Encounter for immunization  Comments:  Flu vaccine up-to-date, recommend COVID booster and Tdap and Shingrix  Orders:  -     COVID-19 Bivalent Booster (Pfizer) 12+yrs    3. Colon cancer screening  Comments:  Colonoscopy is up-to-date  Orders:  -     POC Occult Blood Stool    4. Postmenopausal state  Comments:  DEXA is up-to-date.    5. Encounter for screening mammogram for breast cancer  Comments:  Mammogram is up-to-date and breast exam within normal limits today    6. Encounter for screening for other viral diseases  -     Hepatitis C antibody; Future    7. Primary hypertension  Comments:  Stable.  We will check kidney function and electrolytes today on labs.  Not on any medication at this time  Orders:  -     Comprehensive Metabolic Panel; Future  -     CBC (No Diff); Future    8. Hypercholesteremia  Comments:  Diet controlled.  Due for lab work  Orders:  -     Lipid Panel; Future    9. Normocytic anemia  Comments:  Not on B12 or iron supplementation.  We will recheck a CBC today  Orders:  -     CBC (No Diff); Future    10. Acquired hallux valgus of left foot  Comments:  Pending left bunion surgery.    11. Family history of diabetes mellitus  Comments:  We will check a hemoglobin A1c.  Orders:  -     Hemoglobin A1c; Future    12.  Overweight  Comments:  Recommend low-carb diet and daily exercise.  We will refer her to the dietitian when she is ready          Follow Up   Return in about 6 months (around 5/28/2023) for Recheck.

## 2022-12-01 ENCOUNTER — LAB (OUTPATIENT)
Dept: LAB | Facility: HOSPITAL | Age: 57
End: 2022-12-01

## 2022-12-01 DIAGNOSIS — Z83.3 FAMILY HISTORY OF DIABETES MELLITUS: ICD-10-CM

## 2022-12-01 DIAGNOSIS — E78.00 HYPERCHOLESTEREMIA: ICD-10-CM

## 2022-12-01 DIAGNOSIS — Z11.59 ENCOUNTER FOR SCREENING FOR OTHER VIRAL DISEASES: ICD-10-CM

## 2022-12-01 DIAGNOSIS — I10 PRIMARY HYPERTENSION: ICD-10-CM

## 2022-12-01 DIAGNOSIS — D64.9 NORMOCYTIC ANEMIA: ICD-10-CM

## 2022-12-01 LAB
ALBUMIN SERPL-MCNC: 4.4 G/DL (ref 3.5–5.2)
ALBUMIN/GLOB SERPL: 1.7 G/DL
ALP SERPL-CCNC: 130 U/L (ref 39–117)
ALT SERPL W P-5'-P-CCNC: 41 U/L (ref 1–33)
ANION GAP SERPL CALCULATED.3IONS-SCNC: 10 MMOL/L (ref 5–15)
AST SERPL-CCNC: 29 U/L (ref 1–32)
BILIRUB SERPL-MCNC: 0.4 MG/DL (ref 0–1.2)
BUN SERPL-MCNC: 15 MG/DL (ref 6–20)
BUN/CREAT SERPL: 22.4 (ref 7–25)
CALCIUM SPEC-SCNC: 9.6 MG/DL (ref 8.6–10.5)
CHLORIDE SERPL-SCNC: 101 MMOL/L (ref 98–107)
CHOLEST SERPL-MCNC: 200 MG/DL (ref 0–200)
CO2 SERPL-SCNC: 30 MMOL/L (ref 22–29)
CREAT SERPL-MCNC: 0.67 MG/DL (ref 0.57–1)
DEPRECATED RDW RBC AUTO: 43.4 FL (ref 37–54)
EGFRCR SERPLBLD CKD-EPI 2021: 101.5 ML/MIN/1.73
ERYTHROCYTE [DISTWIDTH] IN BLOOD BY AUTOMATED COUNT: 12.4 % (ref 12.3–15.4)
GLOBULIN UR ELPH-MCNC: 2.6 GM/DL
GLUCOSE SERPL-MCNC: 107 MG/DL (ref 65–99)
HBA1C MFR BLD: 6.2 % (ref 4.8–5.6)
HCT VFR BLD AUTO: 41 % (ref 34–46.6)
HCV AB SER DONR QL: NORMAL
HDLC SERPL-MCNC: 54 MG/DL (ref 40–60)
HGB BLD-MCNC: 13 G/DL (ref 12–15.9)
LDLC SERPL CALC-MCNC: 128 MG/DL (ref 0–100)
LDLC/HDLC SERPL: 2.33 {RATIO}
MCH RBC QN AUTO: 30.2 PG (ref 26.6–33)
MCHC RBC AUTO-ENTMCNC: 31.7 G/DL (ref 31.5–35.7)
MCV RBC AUTO: 95.1 FL (ref 79–97)
PLATELET # BLD AUTO: 259 10*3/MM3 (ref 140–450)
PMV BLD AUTO: 9.7 FL (ref 6–12)
POTASSIUM SERPL-SCNC: 4.3 MMOL/L (ref 3.5–5.2)
PROT SERPL-MCNC: 7 G/DL (ref 6–8.5)
RBC # BLD AUTO: 4.31 10*6/MM3 (ref 3.77–5.28)
SODIUM SERPL-SCNC: 141 MMOL/L (ref 136–145)
TRIGL SERPL-MCNC: 101 MG/DL (ref 0–150)
VLDLC SERPL-MCNC: 18 MG/DL (ref 5–40)
WBC NRBC COR # BLD: 3.56 10*3/MM3 (ref 3.4–10.8)

## 2022-12-01 PROCEDURE — 86803 HEPATITIS C AB TEST: CPT

## 2022-12-01 PROCEDURE — 36415 COLL VENOUS BLD VENIPUNCTURE: CPT

## 2022-12-01 PROCEDURE — 80053 COMPREHEN METABOLIC PANEL: CPT

## 2022-12-01 PROCEDURE — 80061 LIPID PANEL: CPT

## 2022-12-01 PROCEDURE — 83036 HEMOGLOBIN GLYCOSYLATED A1C: CPT

## 2022-12-01 PROCEDURE — 85027 COMPLETE CBC AUTOMATED: CPT

## 2022-12-02 DIAGNOSIS — R74.8 ELEVATED LIVER ENZYMES: Primary | ICD-10-CM

## 2022-12-07 LAB
CYTOLOGIST CVX/VAG CYTO: NORMAL
CYTOLOGY CVX/VAG DOC CYTO: NORMAL
DX ICD CODE: NORMAL
HIV 1 & 2 AB SER-IMP: NORMAL
Lab: NORMAL
OTHER STN SPEC: NORMAL
RECOM F/U CVX/VAG CYTO: NORMAL
STAT OF ADQ CVX/VAG CYTO-IMP: NORMAL

## 2023-01-10 RX ORDER — TRAZODONE HYDROCHLORIDE 150 MG/1
TABLET ORAL
Qty: 90 TABLET | Refills: 0 | Status: SHIPPED | OUTPATIENT
Start: 2023-01-10

## 2023-01-10 NOTE — TELEPHONE ENCOUNTER
Caller: Denia Wooten    Relationship: Self    Best call back number: [phone]    Requested Prescriptions:   Requested Prescriptions     Pending Prescriptions Disp Refills   • traZODone (DESYREL) 150 MG tablet 90 tablet 0        Pharmacy where request should be sent: [facility] [id number] [place], [place] - [address] [facility] - [phone]  - [phone] FX     Additional details provided by patient:    Does the patient have less than a 3 day supply:  [x] Yes  [] No    Would you like a call back once the refill request has been completed: [x] Yes [] No    If the office needs to give you a call back, can they leave a voicemail: [x] Yes [] No    Winston Smith Rep   01/10/23 10:22 EST

## 2023-02-02 ENCOUNTER — TRANSCRIBE ORDERS (OUTPATIENT)
Dept: ADMINISTRATIVE | Facility: HOSPITAL | Age: 58
End: 2023-02-02
Payer: COMMERCIAL

## 2023-02-02 DIAGNOSIS — Z12.31 VISIT FOR SCREENING MAMMOGRAM: Primary | ICD-10-CM

## 2023-02-28 ENCOUNTER — HOSPITAL ENCOUNTER (OUTPATIENT)
Facility: SURGERY CENTER | Age: 58
Setting detail: HOSPITAL OUTPATIENT SURGERY
Discharge: HOME | End: 2023-02-28
Attending: PODIATRIST | Admitting: PODIATRIST
Payer: COMMERCIAL

## 2023-02-28 ENCOUNTER — ANESTHESIA EVENT (OUTPATIENT)
Dept: SURGERY | Facility: SURGERY CENTER | Age: 58
End: 2023-02-28
Payer: COMMERCIAL

## 2023-02-28 ENCOUNTER — ANESTHESIA (OUTPATIENT)
Dept: SURGERY | Facility: SURGERY CENTER | Age: 58
End: 2023-02-28
Payer: COMMERCIAL

## 2023-02-28 VITALS
HEIGHT: 63 IN | RESPIRATION RATE: 16 BRPM | HEART RATE: 58 BPM | OXYGEN SATURATION: 98 % | WEIGHT: 219.4 LBS | SYSTOLIC BLOOD PRESSURE: 145 MMHG | DIASTOLIC BLOOD PRESSURE: 74 MMHG | TEMPERATURE: 97.5 F | BODY MASS INDEX: 38.88 KG/M2

## 2023-02-28 DIAGNOSIS — M20.12 HALLUX VALGUS, LEFT: Primary | ICD-10-CM

## 2023-02-28 LAB — GLUCOSE BLDC GLUCOMTR-MCNC: 114 MG/DL (ref 70–130)

## 2023-02-28 PROCEDURE — 25010000002 FENTANYL CITRATE (PF) 100 MCG/2ML SOLUTION: Performed by: ANESTHESIOLOGY

## 2023-02-28 PROCEDURE — 0 CEFAZOLIN SODIUM-DEXTROSE 2-3 GM-%(50ML) RECONSTITUTED SOLUTION: Performed by: ANESTHESIOLOGY

## 2023-02-28 PROCEDURE — 25010000002 MIDAZOLAM PER 1 MG: Performed by: ANESTHESIOLOGY

## 2023-02-28 PROCEDURE — C1713 ANCHOR/SCREW BN/BN,TIS/BN: HCPCS | Performed by: PODIATRIST

## 2023-02-28 PROCEDURE — 0 LIDOCAINE 1 % SOLUTION: Performed by: PODIATRIST

## 2023-02-28 PROCEDURE — 28296 COR HLX VLGS DSTL MTAR OSTEO: CPT | Performed by: PODIATRIST

## 2023-02-28 PROCEDURE — 25010000002 PROPOFOL 10 MG/ML EMULSION: Performed by: ANESTHESIOLOGY

## 2023-02-28 DEVICE — SCRW CORT S/TAP 2.7X16MM: Type: IMPLANTABLE DEVICE | Site: FOOT | Status: FUNCTIONAL

## 2023-02-28 DEVICE — SCRW CORT S/TAP 2.7X14MM: Type: IMPLANTABLE DEVICE | Site: FOOT | Status: FUNCTIONAL

## 2023-02-28 RX ORDER — LIDOCAINE HYDROCHLORIDE 10 MG/ML
0.5 INJECTION, SOLUTION INFILTRATION; PERINEURAL ONCE AS NEEDED
Status: DISCONTINUED | OUTPATIENT
Start: 2023-02-28 | End: 2023-02-28 | Stop reason: HOSPADM

## 2023-02-28 RX ORDER — SODIUM CHLORIDE, SODIUM LACTATE, POTASSIUM CHLORIDE, CALCIUM CHLORIDE 600; 310; 30; 20 MG/100ML; MG/100ML; MG/100ML; MG/100ML
1000 INJECTION, SOLUTION INTRAVENOUS CONTINUOUS
Status: DISCONTINUED | OUTPATIENT
Start: 2023-02-28 | End: 2023-02-28 | Stop reason: HOSPADM

## 2023-02-28 RX ORDER — SODIUM CHLORIDE, SODIUM LACTATE, POTASSIUM CHLORIDE, CALCIUM CHLORIDE 600; 310; 30; 20 MG/100ML; MG/100ML; MG/100ML; MG/100ML
9 INJECTION, SOLUTION INTRAVENOUS CONTINUOUS
Status: DISCONTINUED | OUTPATIENT
Start: 2023-02-28 | End: 2023-02-28 | Stop reason: HOSPADM

## 2023-02-28 RX ORDER — MIDAZOLAM HYDROCHLORIDE 1 MG/ML
1 INJECTION INTRAMUSCULAR; INTRAVENOUS
Status: DISCONTINUED | OUTPATIENT
Start: 2023-02-28 | End: 2023-02-28 | Stop reason: HOSPADM

## 2023-02-28 RX ORDER — ONDANSETRON 2 MG/ML
4 INJECTION INTRAMUSCULAR; INTRAVENOUS ONCE AS NEEDED
Status: DISCONTINUED | OUTPATIENT
Start: 2023-02-28 | End: 2023-02-28 | Stop reason: HOSPADM

## 2023-02-28 RX ORDER — MEPERIDINE HYDROCHLORIDE 25 MG/ML
12.5 INJECTION INTRAMUSCULAR; INTRAVENOUS; SUBCUTANEOUS
Status: DISCONTINUED | OUTPATIENT
Start: 2023-02-28 | End: 2023-02-28 | Stop reason: HOSPADM

## 2023-02-28 RX ORDER — CEFAZOLIN SODIUM 2 G/50ML
SOLUTION INTRAVENOUS AS NEEDED
Status: DISCONTINUED | OUTPATIENT
Start: 2023-02-28 | End: 2023-02-28 | Stop reason: SURG

## 2023-02-28 RX ORDER — FLUMAZENIL 0.1 MG/ML
0.2 INJECTION INTRAVENOUS AS NEEDED
Status: DISCONTINUED | OUTPATIENT
Start: 2023-02-28 | End: 2023-02-28 | Stop reason: HOSPADM

## 2023-02-28 RX ORDER — LIDOCAINE HYDROCHLORIDE 20 MG/ML
INJECTION, SOLUTION INFILTRATION; PERINEURAL AS NEEDED
Status: DISCONTINUED | OUTPATIENT
Start: 2023-02-28 | End: 2023-02-28 | Stop reason: SURG

## 2023-02-28 RX ORDER — DIPHENHYDRAMINE HYDROCHLORIDE 50 MG/ML
12.5 INJECTION INTRAMUSCULAR; INTRAVENOUS
Status: DISCONTINUED | OUTPATIENT
Start: 2023-02-28 | End: 2023-02-28 | Stop reason: HOSPADM

## 2023-02-28 RX ORDER — BUPIVACAINE HYDROCHLORIDE 5 MG/ML
INJECTION, SOLUTION PERINEURAL AS NEEDED
Status: DISCONTINUED | OUTPATIENT
Start: 2023-02-28 | End: 2023-02-28 | Stop reason: HOSPADM

## 2023-02-28 RX ORDER — LIDOCAINE HYDROCHLORIDE 10 MG/ML
INJECTION, SOLUTION INFILTRATION; PERINEURAL AS NEEDED
Status: DISCONTINUED | OUTPATIENT
Start: 2023-02-28 | End: 2023-02-28 | Stop reason: HOSPADM

## 2023-02-28 RX ORDER — MAGNESIUM HYDROXIDE 1200 MG/15ML
LIQUID ORAL AS NEEDED
Status: DISCONTINUED | OUTPATIENT
Start: 2023-02-28 | End: 2023-02-28 | Stop reason: HOSPADM

## 2023-02-28 RX ORDER — DROPERIDOL 2.5 MG/ML
0.62 INJECTION, SOLUTION INTRAMUSCULAR; INTRAVENOUS
Status: DISCONTINUED | OUTPATIENT
Start: 2023-02-28 | End: 2023-02-28 | Stop reason: HOSPADM

## 2023-02-28 RX ORDER — FENTANYL CITRATE 50 UG/ML
INJECTION, SOLUTION INTRAMUSCULAR; INTRAVENOUS AS NEEDED
Status: DISCONTINUED | OUTPATIENT
Start: 2023-02-28 | End: 2023-02-28 | Stop reason: SURG

## 2023-02-28 RX ORDER — NALOXONE HCL 0.4 MG/ML
0.2 VIAL (ML) INJECTION AS NEEDED
Status: DISCONTINUED | OUTPATIENT
Start: 2023-02-28 | End: 2023-02-28 | Stop reason: HOSPADM

## 2023-02-28 RX ORDER — FENTANYL CITRATE 50 UG/ML
50 INJECTION, SOLUTION INTRAMUSCULAR; INTRAVENOUS
Status: DISCONTINUED | OUTPATIENT
Start: 2023-02-28 | End: 2023-02-28 | Stop reason: HOSPADM

## 2023-02-28 RX ORDER — PROPOFOL 10 MG/ML
VIAL (ML) INTRAVENOUS AS NEEDED
Status: DISCONTINUED | OUTPATIENT
Start: 2023-02-28 | End: 2023-02-28 | Stop reason: SURG

## 2023-02-28 RX ORDER — PROMETHAZINE HYDROCHLORIDE 25 MG/1
25 SUPPOSITORY RECTAL ONCE AS NEEDED
Status: DISCONTINUED | OUTPATIENT
Start: 2023-02-28 | End: 2023-02-28 | Stop reason: HOSPADM

## 2023-02-28 RX ORDER — FAMOTIDINE 10 MG/ML
20 INJECTION, SOLUTION INTRAVENOUS ONCE
Status: COMPLETED | OUTPATIENT
Start: 2023-02-28 | End: 2023-02-28

## 2023-02-28 RX ORDER — PROMETHAZINE HYDROCHLORIDE 12.5 MG/1
25 TABLET ORAL ONCE AS NEEDED
Status: DISCONTINUED | OUTPATIENT
Start: 2023-02-28 | End: 2023-02-28 | Stop reason: HOSPADM

## 2023-02-28 RX ADMIN — CEFAZOLIN SODIUM 2 G: 2 SOLUTION INTRAVENOUS at 07:32

## 2023-02-28 RX ADMIN — PROPOFOL INJECTABLE EMULSION 50 MG: 10 INJECTION, EMULSION INTRAVENOUS at 07:36

## 2023-02-28 RX ADMIN — PROPOFOL INJECTABLE EMULSION 120 MCG/KG/MIN: 10 INJECTION, EMULSION INTRAVENOUS at 07:37

## 2023-02-28 RX ADMIN — SODIUM CHLORIDE, POTASSIUM CHLORIDE, SODIUM LACTATE AND CALCIUM CHLORIDE 1000 ML: 600; 310; 30; 20 INJECTION, SOLUTION INTRAVENOUS at 06:41

## 2023-02-28 RX ADMIN — FENTANYL CITRATE 50 MCG: 50 INJECTION INTRAMUSCULAR; INTRAVENOUS at 07:48

## 2023-02-28 RX ADMIN — FAMOTIDINE 20 MG: 10 INJECTION, SOLUTION INTRAVENOUS at 07:28

## 2023-02-28 RX ADMIN — LIDOCAINE HYDROCHLORIDE 100 MG: 20 INJECTION, SOLUTION INFILTRATION; PERINEURAL at 07:36

## 2023-02-28 RX ADMIN — MIDAZOLAM 1 MG: 1 INJECTION INTRAMUSCULAR; INTRAVENOUS at 07:31

## 2023-02-28 NOTE — ANESTHESIA POSTPROCEDURE EVALUATION
"Patient: Denia Wooten    Procedure Summary     Date: 02/28/23 Room / Location[facility]    Anesthesia Start: 0732 Anesthesia Stop: 0819    Procedure: BUNIONECTOMY LEFT FOOT WITH OSTEOTOMY (Left: Foot) Diagnosis:       Acquired hallux valgus of left foot      (Acquired hallux valgus of left foot [M20.12])    Surgeons: Fabian Liao DPM Provider: Radha Melendrez MD    Anesthesia Type: MAC ASA Status: 3          Anesthesia Type: MAC    Vitals  Vitals Value Taken Time   /81 02/28/23 0827   Temp 36.4 °C (97.5 °F) 02/28/23 0822   Pulse 52 02/28/23 0827   Resp 16 02/28/23 0827   SpO2 98 % 02/28/23 0827           Post Anesthesia Care and Evaluation    Patient location during evaluation: bedside  Patient participation: complete - patient participated  Level of consciousness: awake  Pain management: adequate    Airway patency: patent  Anesthetic complications: No anesthetic complications  PONV Status: controlled  Cardiovascular status: acceptable  Respiratory status: acceptable  Hydration status: acceptable    Comments: /81   Pulse 52   Temp 36.4 °C (97.5 °F) (Tympanic)   Resp 16   Ht 160 cm (63\")   Wt 99.5 kg (219 lb 6.4 oz)   SpO2 98%   BMI 38.86 kg/m²         "

## 2023-02-28 NOTE — ANESTHESIA PREPROCEDURE EVALUATION
" Anesthesia Evaluation     Patient summary reviewed and Nursing notes reviewed   no history of anesthetic complications:  NPO Solid Status: > 8 hours             Airway   Mallampati: II  Large neck circumference  Dental - normal exam     Pulmonary    (+) a smoker Former, shortness of breath, sleep apnea,   Cardiovascular   Exercise tolerance: good (4-7 METS)    Rhythm: regular    (+) hypertension, hyperlipidemia,       Neuro/Psych- negative ROS  GI/Hepatic/Renal/Endo    (+) morbid obesity,  renal disease stones, diabetes mellitus,     Musculoskeletal     Abdominal   (+) obese,    Substance History - negative use     OB/GYN negative ob/gyn ROS         Other   arthritis,    history of cancer                    Anesthesia Plan    ASA 3     MAC     (/81 (BP Location: Left arm, Patient Position: Lying)   Pulse 55   Temp 36.4 °C (97.5 °F) (Temporal)   Resp 16   Ht 160 cm (63\")   Wt 99.5 kg (219 lb 6.4 oz)   SpO2 96%   BMI 38.86 kg/m²     I have reviewed the patient's history with the patient and the chart, including all pertinent laboratory results and imaging. I have explained the risks of anesthesia including but not limited to dental damage, corneal abrasion, nerve injury, MI, stroke, and death.    )    Anesthetic plan, risks, benefits, and alternatives have been provided, discussed and informed consent has been obtained with: patient.        CODE STATUS:       "

## 2023-03-08 ENCOUNTER — TELEPHONE (OUTPATIENT)
Dept: FAMILY MEDICINE CLINIC | Age: 58
End: 2023-03-08
Payer: COMMERCIAL

## 2023-03-09 ENCOUNTER — LAB (OUTPATIENT)
Dept: LAB | Facility: HOSPITAL | Age: 58
End: 2023-03-09
Payer: COMMERCIAL

## 2023-03-09 DIAGNOSIS — R74.8 ELEVATED LIVER ENZYMES: ICD-10-CM

## 2023-03-09 LAB
ALBUMIN SERPL-MCNC: 4.7 G/DL (ref 3.5–5.2)
ALBUMIN/GLOB SERPL: 1.9 G/DL
ALP SERPL-CCNC: 126 U/L (ref 39–117)
ALT SERPL W P-5'-P-CCNC: 42 U/L (ref 1–33)
ANION GAP SERPL CALCULATED.3IONS-SCNC: 11.8 MMOL/L (ref 5–15)
AST SERPL-CCNC: 26 U/L (ref 1–32)
BILIRUB SERPL-MCNC: 0.2 MG/DL (ref 0–1.2)
BUN SERPL-MCNC: 15 MG/DL (ref 6–20)
BUN/CREAT SERPL: 22.7 (ref 7–25)
CALCIUM SPEC-SCNC: 9.5 MG/DL (ref 8.6–10.5)
CHLORIDE SERPL-SCNC: 103 MMOL/L (ref 98–107)
CO2 SERPL-SCNC: 26.2 MMOL/L (ref 22–29)
CREAT SERPL-MCNC: 0.66 MG/DL (ref 0.57–1)
EGFRCR SERPLBLD CKD-EPI 2021: 101.8 ML/MIN/1.73
GLOBULIN UR ELPH-MCNC: 2.5 GM/DL
GLUCOSE SERPL-MCNC: 103 MG/DL (ref 65–99)
POTASSIUM SERPL-SCNC: 5.2 MMOL/L (ref 3.5–5.2)
PROT SERPL-MCNC: 7.2 G/DL (ref 6–8.5)
SODIUM SERPL-SCNC: 141 MMOL/L (ref 136–145)

## 2023-03-09 PROCEDURE — 80053 COMPREHEN METABOLIC PANEL: CPT

## 2023-03-09 PROCEDURE — 36415 COLL VENOUS BLD VENIPUNCTURE: CPT

## 2023-05-23 ENCOUNTER — OFFICE VISIT (OUTPATIENT)
Dept: FAMILY MEDICINE CLINIC | Age: 58
End: 2023-05-23
Payer: COMMERCIAL

## 2023-05-23 ENCOUNTER — TELEPHONE (OUTPATIENT)
Dept: FAMILY MEDICINE CLINIC | Age: 58
End: 2023-05-23
Payer: COMMERCIAL

## 2023-05-23 VITALS
HEART RATE: 62 BPM | DIASTOLIC BLOOD PRESSURE: 77 MMHG | OXYGEN SATURATION: 99 % | WEIGHT: 224.6 LBS | SYSTOLIC BLOOD PRESSURE: 145 MMHG | BODY MASS INDEX: 39.8 KG/M2 | HEIGHT: 63 IN

## 2023-05-23 DIAGNOSIS — L02.91 ABSCESS: Primary | ICD-10-CM

## 2023-05-23 PROCEDURE — 99213 OFFICE O/P EST LOW 20 MIN: CPT | Performed by: NURSE PRACTITIONER

## 2023-05-23 RX ORDER — HYDROCODONE BITARTRATE AND ACETAMINOPHEN 7.5; 325 MG/1; MG/1
TABLET ORAL
COMMUNITY
Start: 2023-02-27 | End: 2023-05-23

## 2023-05-23 RX ORDER — ANASTROZOLE 1 MG/1
1 TABLET ORAL DAILY
COMMUNITY
Start: 2022-12-26

## 2023-05-23 RX ORDER — DOXYCYCLINE HYCLATE 100 MG/1
100 CAPSULE ORAL 2 TIMES DAILY
Qty: 14 CAPSULE | Refills: 0 | Status: SHIPPED | OUTPATIENT
Start: 2023-05-23 | End: 2023-05-30

## 2023-05-23 NOTE — TELEPHONE ENCOUNTER
Caller: Denia Wooten    Relationship to patient: Self    Best call back number: [phone]    Chief complaint: N/A    Type of visit: SAME DAY    Requested date: 05/23/2023     If rescheduling, when is the original appointment: N/A    Additional notes:PATIENT IS GETTING OFF WORK EARLY TODAY. SHE HAS A SPOT ON HER RIGHT LEG DR CAMERON HAS BEEN MONITORING. PLACE ON RIGHT LEG NEEDS LOOKED AT GOTTEN BIGGER AND REAL RED NOW. PLEASE CALL HER BACK AND SCHEDULE FOR THIS AFTERNOON. DOES NOT HAVE TO BE DR CAMERON.

## 2023-05-23 NOTE — PROGRESS NOTES
"Chief Complaint  Skin Lesion (Skin lesion on right groin area. Skin lesion has been there for years but has worsening in the last 3 days is now red and hot to the touch. )    Subjective        Denia Wooten presents to [facility]  History of Present Illness     Denia is here today with reports of a bump that came up on her right groin around 4 days ago. Said she has a bump in that area Dr. Lima has been watching for around 3 years but she is not sure if it is the same bump. Reports pain upon touching the bump, warmth, and a small amount of pink tinged drainage from the wound yesterday. Denies fever, chills, malaise.     Objective   Vital Signs:  /77 (BP Location: Left arm, Patient Position: Sitting, Cuff Size: Large Adult)   Pulse 62   Ht 160 cm (63\")   Wt 102 kg (224 lb 9.6 oz)   SpO2 99%   BMI 39.79 kg/m²   Estimated body mass index is 39.79 kg/m² as calculated from the following:    Height as of this encounter: 160 cm (63\").    Weight as of this encounter: 102 kg (224 lb 9.6 oz).             Physical Exam  HENT:      Head: Normocephalic.      Nose: Nose normal.   Eyes:      Pupils: Pupils are equal, round, and reactive to light.   Cardiovascular:      Rate and Rhythm: Normal rate and regular rhythm.      Pulses: Normal pulses.      Heart sounds: Normal heart sounds.   Pulmonary:      Effort: Pulmonary effort is normal.      Breath sounds: Normal breath sounds.   Skin:     General: Skin is warm and dry.      Findings: Abscess present.          Neurological:      Mental Status: She is alert and oriented to person, place, and time.   Psychiatric:         Mood and Affect: Mood normal.         Behavior: Behavior normal.        Result Review :                   Assessment and Plan   Diagnoses and all orders for this visit:    1. Abscess (Primary)  Comments:  Apply warm compress to area twice daily.   Orders:  -     doxycycline (VIBRAMYCIN) 100 MG capsule; Take 1 capsule " by mouth 2 (Two) Times a Day for 7 days.  Dispense: 14 capsule; Refill: 0    Follow up if symptoms worsen or do not improve or if you develop fever or chills. Education handout given on abscess.     Seen with NP student Radha Goodson       Follow Up   Return if symptoms worsen or fail to improve.  Patient was given instructions and counseling regarding her condition or for health maintenance advice. Please see specific information pulled into the AVS if appropriate.

## 2023-08-29 ENCOUNTER — OFFICE VISIT (OUTPATIENT)
Dept: FAMILY MEDICINE CLINIC | Age: 58
End: 2023-08-29
Payer: COMMERCIAL

## 2023-08-29 VITALS
SYSTOLIC BLOOD PRESSURE: 129 MMHG | HEART RATE: 60 BPM | DIASTOLIC BLOOD PRESSURE: 83 MMHG | WEIGHT: 211.8 LBS | OXYGEN SATURATION: 97 % | BODY MASS INDEX: 37.53 KG/M2 | HEIGHT: 63 IN

## 2023-08-29 DIAGNOSIS — E78.00 HYPERCHOLESTEREMIA: ICD-10-CM

## 2023-08-29 DIAGNOSIS — R73.03 BORDERLINE DIABETES MELLITUS: ICD-10-CM

## 2023-08-29 DIAGNOSIS — E88.81 INSULIN RESISTANCE: ICD-10-CM

## 2023-08-29 DIAGNOSIS — R53.83 OTHER FATIGUE: ICD-10-CM

## 2023-08-29 DIAGNOSIS — D50.9 IRON DEFICIENCY ANEMIA, UNSPECIFIED IRON DEFICIENCY ANEMIA TYPE: ICD-10-CM

## 2023-08-29 DIAGNOSIS — D51.9 ANEMIA DUE TO VITAMIN B12 DEFICIENCY, UNSPECIFIED B12 DEFICIENCY TYPE: ICD-10-CM

## 2023-08-29 DIAGNOSIS — R74.8 ELEVATED LIVER ENZYMES: Primary | ICD-10-CM

## 2023-08-29 DIAGNOSIS — E66.01 CLASS 2 SEVERE OBESITY DUE TO EXCESS CALORIES WITH SERIOUS COMORBIDITY AND BODY MASS INDEX (BMI) OF 37.0 TO 37.9 IN ADULT: ICD-10-CM

## 2023-08-29 DIAGNOSIS — I10 PRIMARY HYPERTENSION: ICD-10-CM

## 2023-08-29 RX ORDER — AMOXICILLIN 500 MG/1
CAPSULE ORAL
COMMUNITY
Start: 2023-06-07

## 2023-08-29 RX ORDER — AMPICILLIN TRIHYDRATE 250 MG
CAPSULE ORAL
COMMUNITY
Start: 2023-08-01

## 2023-08-29 RX ORDER — CHOLECALCIFEROL (VITAMIN D3) 25 MCG
TABLET,CHEWABLE ORAL
COMMUNITY
Start: 2023-08-01

## 2023-08-29 RX ORDER — PRASTERONE (DHEA) 25 MG
CAPSULE ORAL
COMMUNITY
Start: 2023-08-01

## 2023-08-29 NOTE — PROGRESS NOTES
Know anything about this DMI staff Denia Wooten presents to [facility].    Chief Complaint:    Subjective     History of Present Illness:  Rash  Pertinent negatives include no cough, diarrhea, fatigue, fever, shortness of breath, sore throat or vomiting.   She is ging to [employer] and on Mounjaro, testosterone inj, dhea, vit d3, B12, metformin, MVI, omega-3 and is feeling great and losing wt     Result Review   The following data was reviewed by Mariel Lima MD on 08/29/2023.  Lab Results   Component Value Date    WBC 3.56 12/01/2022    HGB 13.0 12/01/2022    HCT 41.0 12/01/2022    MCV 95.1 12/01/2022     12/01/2022     Lab Results   Component Value Date    GLUCOSE 103 (H) 03/09/2023    BUN 15 03/09/2023    CREATININE 0.66 03/09/2023    EGFR 101.8 03/09/2023    BCR 22.7 03/09/2023    K 5.2 03/09/2023    CO2 26.2 03/09/2023    CALCIUM 9.5 03/09/2023    PROTENTOTREF 7.7 07/03/2018    ALBUMIN 4.7 03/09/2023    BILITOT 0.2 03/09/2023    AST 26 03/09/2023    ALT 42 (H) 03/09/2023     Lab Results   Component Value Date    CHOL 200 12/01/2022    CHLPL 188 04/08/2021    TRIG 101 12/01/2022    HDL 54 12/01/2022     (H) 12/01/2022     No results found for: TSH  Lab Results   Component Value Date    HGBA1C 6.20 (H) 12/01/2022     No results found for: PSA  Lab Results   Component Value Date    IRON 24 (L) 02/10/2017      Lab Results   Component Value Date    GGRA73OX 36.8 04/08/2021               Assessment and Plan:   Diagnoses and all orders for this visit:    1. Elevated liver enzymes (Primary)  Comments:  She has lost 10 pounds and is doing well.  We will recheck liver enzymes.  Orders:  -     Comprehensive metabolic panel; Future    2. Borderline diabetes mellitus  Comments:  She is going to a diet center and is on Mounjaro compounded and metformin.  She has lost 10 pounds.  We will recheck labs to reassess  Orders:  -     Hemoglobin A1c; Future    3. Other  "fatigue  Comments:  improved on B12 and vit d supplementation    4. Class 2 severe obesity due to excess calories with serious comorbidity and body mass index (BMI) of 37.0 to 37.9 in adult  Comments:  She is ging to [employer] and on Mounjaro, testosterone inj, dhea, vit d3, B12, metformin, MVI, omega-3 and is feeling great and losing wt    5. Insulin resistance  Comments:  now on mounjaro injections (compounded) and metformin  Orders:  -     Insulin, Free & Total, Serum; Future    6. Anemia due to vitamin B12 deficiency, unspecified B12 deficiency type  Comments:  now on B12 oral pills, MVI  Orders:  -     Vitamin B12; Future    7. Hypercholesteremia  Comments:  she has lost 10 #s so far.  Orders:  -     Lipid panel; Future    8. Primary hypertension  Comments:  BP is stable and well controlled, not on any BP meds    9. Iron deficiency anemia, unspecified iron deficiency anemia type  Comments:  We will recheck iron levels.  She is not currently on iron supplementation  Orders:  -     Iron Profile; Future    10. Body mass index (BMI) of 37.0 to 37.9 in adult              Objective     Medications:  Current Outpatient Medications   Medication Instructions    acyclovir (Zovirax) 5 % ointment 1 application , Topical, Every 3 Hours    amoxicillin (AMOXIL) 500 MG capsule     anastrozole (ARIMIDEX) 1 mg, Oral, Daily    Cyanocobalamin (B-12) 1000 MCG tablet controlled-release     DHEA 25 MG capsule     METFORMIN & DIET MANAGE PROD PO     metFORMIN (GLUCOPHAGE) 1000 MG tablet     Omega-3 Fatty Acids (Super Omega-3) 1000 MG capsule     vitamin D3 125 MCG (5000 UT) capsule capsule         Vital Signs:   /83 (BP Location: Right arm, Patient Position: Sitting)   Pulse 60   Ht 160 cm (62.99\")   Wt 96.1 kg (211 lb 12.8 oz)   SpO2 97%   BMI 37.53 kg/mý     Class 2 Severe Obesity (BMI >=35 and <=39.9). Obesity-related health conditions include the following: hypertension and dyslipidemias. Obesity is improving with " treatment. BMI is is above average; BMI management plan is completed. We discussed portion control and increasing exercise.       Physical Exam:  Physical Exam  Vitals and nursing note reviewed.   Constitutional:       General: She is not in acute distress.     Appearance: Normal appearance. She is not ill-appearing, toxic-appearing or diaphoretic.   HENT:      Head: Normocephalic and atraumatic.      Right Ear: Tympanic membrane, ear canal and external ear normal.      Left Ear: Tympanic membrane, ear canal and external ear normal.      Nose: No congestion or rhinorrhea.      Mouth/Throat:      Mouth: Mucous membranes are moist.      Pharynx: Oropharynx is clear. No oropharyngeal exudate or posterior oropharyngeal erythema.   Eyes:      Extraocular Movements: Extraocular movements intact.      Conjunctiva/sclera: Conjunctivae normal.      Pupils: Pupils are equal, round, and reactive to light.   Cardiovascular:      Rate and Rhythm: Normal rate and regular rhythm.      Heart sounds: Normal heart sounds.   Pulmonary:      Effort: Pulmonary effort is normal.      Breath sounds: Normal breath sounds. No wheezing, rhonchi or rales.   Abdominal:      General: Abdomen is flat.      Palpations: Abdomen is soft. There is no mass.      Tenderness: There is no abdominal tenderness.      Hernia: No hernia is present.   Musculoskeletal:      Cervical back: Neck supple. No rigidity.      Right lower leg: No edema.      Left lower leg: No edema.   Lymphadenopathy:      Cervical: No cervical adenopathy.   Skin:     General: Skin is warm and dry.   Neurological:      General: No focal deficit present.      Mental Status: She is alert and oriented to person, place, and time. Mental status is at baseline.   Psychiatric:         Mood and Affect: Mood normal.         Behavior: Behavior normal.         Thought Content: Thought content normal.         Judgment: Judgment normal.       Review of Systems:  Review of Systems    Constitutional:  Negative for chills, fatigue and fever.   HENT:  Negative for ear pain, sinus pressure and sore throat.    Eyes:  Negative for blurred vision and double vision.   Respiratory:  Negative for cough, shortness of breath and wheezing.    Cardiovascular:  Negative for chest pain and palpitations.   Gastrointestinal:  Negative for abdominal pain, blood in stool, constipation, diarrhea, nausea and vomiting.   Skin:  Positive for rash.   Neurological:  Negative for dizziness and headache.   Psychiatric/Behavioral:  Negative for sleep disturbance, suicidal ideas and depressed mood. The patient is not nervous/anxious.             Follow Up   Return in about 3 months (around 11/29/2023) for Recheck.    Part of this note may be an electronic transcription/translation of spoken language to printed   text using the Dragon Dictation System.            Medical History:  Medications Discontinued During This Encounter   Medication Reason    Multiple Vitamins-Minerals (CENTRUM ADULTS PO) Duplicate order    anastrozole (ARIMIDEX) 1 MG tablet Duplicate order    naproxen sodium (ALEVE) 220 MG tablet Duplicate order    traZODone (DESYREL) 150 MG tablet Duplicate order      Past Medical History:    Abnormal chest xray    decreased lung volumes    Allergic rhinitis    Cancer    breast stage 1    Diabetes mellitus type 2 in obese    borderline, stopped Metformin 2 months ago.      Dyspepsia    Dyspnea on exertion    Fatigue    Hypertension    Insomnia    Joint pain    Nephrolithiasis    Normocytic anemia    12.4/37.2    Obesity    Obstructive sleep apnea    CPAP says noncompliant - marlin her/claustrophobic    Osteoarthritis of both knees    per prim.  On diclofenac daily, prn Aleve    Peripheral edema    Restless leg syndrome     Past Surgical History:    BILATERAL BREAST REDUCTION    BREAST SURGERY    biopsy    BUNIONECTOMY    Procedure: BUNIONECTOMY RIGHT FOOT WITH OSTEOTOMY;  Surgeon: Fabian Liao DPM;   Location: [facility];  Service: Podiatry;  Laterality: Right;    BUNIONECTOMY    Procedure: BUNIONECTOMY LEFT FOOT WITH OSTEOTOMY;  Surgeon: Fabian Liao DPM;  Location: [facility];  Service: Podiatry;  Laterality: Left;    COLONOSCOPY    unremarkable    GASTRIC BANDING    s/p LAGB APS  GDW, replaced w/ APL  GDW for band slip, followed by AGBR 2016 d/t band intolerance.    GASTRIC SLEEVE LAPAROSCOPIC    Procedure: GASTRIC SLEEVE LAPAROSCOPIC;  Surgeon: Johnathan Burkett MD;  Location:  [facility];  Service:     KIDNEY STONE SURGERY    RI LAPS SURG ESOPG/GSTR FUNDOPLASTY    Procedure: HIATAL HERNIA REPAIR LAPAROSCOPIC;  Surgeon: Johnathan Burkett MD;  Location:  [facility];  Service: Bariatric      Family History   Problem Relation Age of Onset    Hypertension Mother     Diabetes Mother     Hypertension Father     Sleep apnea Father     Stroke Father     Alzheimer's disease Father     Cancer Maternal Grandmother     Stomach cancer Maternal Grandmother     Alzheimer's disease Maternal Grandmother     Cancer Maternal Grandfather     Hypertension Maternal Grandfather     Diabetes Maternal Grandfather     Stroke Maternal Grandfather     Colon cancer Maternal Grandfather     Heart attack Paternal Grandfather      Social History     Tobacco Use    Smoking status: Former     Packs/day: 1.50     Years: 25.00     Pack years: 37.50     Types: Cigarettes     Quit date:      Years since quittin.6    Smokeless tobacco: Never   Substance Use Topics    Alcohol use: Yes     Comment: wine on occasion       Health Maintenance Due   Topic Date Due    BMI FOLLOWUP  Never done        Immunization History   Administered Date(s) Administered    COVID-19 (PFIZER) BIVALENT 12+YRS 2022    COVID-19 (PFIZER) Purple Cap Monovalent 2021, 2021, 2021    Flu Vaccine Intradermal Quad 18-64YR 2013    Fluzone >6mos 2021, 11/10/2022    Hepatitis A 2018    Influenza LAIV (Nasal)  11/20/2017    Influenza, Unspecified 11/10/2022       Allergies   Allergen Reactions    Ho-Bacit-Poly-Lidocaine Rash    Sulfa Antibiotics Rash

## 2023-08-30 ENCOUNTER — LAB (OUTPATIENT)
Dept: LAB | Facility: HOSPITAL | Age: 58
End: 2023-08-30
Payer: COMMERCIAL

## 2023-08-30 DIAGNOSIS — R74.8 ELEVATED LIVER ENZYMES: ICD-10-CM

## 2023-08-30 DIAGNOSIS — E88.81 INSULIN RESISTANCE: ICD-10-CM

## 2023-08-30 DIAGNOSIS — D51.9 ANEMIA DUE TO VITAMIN B12 DEFICIENCY, UNSPECIFIED B12 DEFICIENCY TYPE: ICD-10-CM

## 2023-08-30 DIAGNOSIS — E78.00 HYPERCHOLESTEREMIA: ICD-10-CM

## 2023-08-30 DIAGNOSIS — R73.03 BORDERLINE DIABETES MELLITUS: ICD-10-CM

## 2023-08-30 DIAGNOSIS — D50.9 IRON DEFICIENCY ANEMIA, UNSPECIFIED IRON DEFICIENCY ANEMIA TYPE: ICD-10-CM

## 2023-08-30 LAB
ALBUMIN SERPL-MCNC: 4.6 G/DL (ref 3.5–5.2)
ALBUMIN/GLOB SERPL: 1.8 G/DL
ALP SERPL-CCNC: 98 U/L (ref 39–117)
ALT SERPL W P-5'-P-CCNC: 29 U/L (ref 1–33)
ANION GAP SERPL CALCULATED.3IONS-SCNC: 11.1 MMOL/L (ref 5–15)
AST SERPL-CCNC: 28 U/L (ref 1–32)
BILIRUB SERPL-MCNC: 0.3 MG/DL (ref 0–1.2)
BUN SERPL-MCNC: 15 MG/DL (ref 6–20)
BUN/CREAT SERPL: 25 (ref 7–25)
CALCIUM SPEC-SCNC: 9.5 MG/DL (ref 8.6–10.5)
CHLORIDE SERPL-SCNC: 105 MMOL/L (ref 98–107)
CHOLEST SERPL-MCNC: 198 MG/DL (ref 0–200)
CO2 SERPL-SCNC: 25.9 MMOL/L (ref 22–29)
CREAT SERPL-MCNC: 0.6 MG/DL (ref 0.57–1)
EGFRCR SERPLBLD CKD-EPI 2021: 104.2 ML/MIN/1.73
GLOBULIN UR ELPH-MCNC: 2.5 GM/DL
GLUCOSE SERPL-MCNC: 90 MG/DL (ref 65–99)
HBA1C MFR BLD: 5.8 % (ref 4.8–5.6)
HDLC SERPL-MCNC: 43 MG/DL (ref 40–60)
IRON 24H UR-MRATE: 78 MCG/DL (ref 37–145)
IRON SATN MFR SERPL: 17 % (ref 20–50)
LDLC SERPL CALC-MCNC: 131 MG/DL (ref 0–100)
LDLC/HDLC SERPL: 2.99 {RATIO}
POTASSIUM SERPL-SCNC: 4.4 MMOL/L (ref 3.5–5.2)
PROT SERPL-MCNC: 7.1 G/DL (ref 6–8.5)
SODIUM SERPL-SCNC: 142 MMOL/L (ref 136–145)
TIBC SERPL-MCNC: 450 MCG/DL (ref 298–536)
TRANSFERRIN SERPL-MCNC: 302 MG/DL (ref 200–360)
TRIGL SERPL-MCNC: 132 MG/DL (ref 0–150)
VIT B12 BLD-MCNC: 784 PG/ML (ref 211–946)
VLDLC SERPL-MCNC: 24 MG/DL (ref 5–40)

## 2023-08-30 PROCEDURE — 80053 COMPREHEN METABOLIC PANEL: CPT

## 2023-08-30 PROCEDURE — 84466 ASSAY OF TRANSFERRIN: CPT

## 2023-08-30 PROCEDURE — 36415 COLL VENOUS BLD VENIPUNCTURE: CPT

## 2023-08-30 PROCEDURE — 82607 VITAMIN B-12: CPT

## 2023-08-30 PROCEDURE — 83527 ASSAY OF INSULIN: CPT

## 2023-08-30 PROCEDURE — 83525 ASSAY OF INSULIN: CPT

## 2023-08-30 PROCEDURE — 83036 HEMOGLOBIN GLYCOSYLATED A1C: CPT

## 2023-08-30 PROCEDURE — 83540 ASSAY OF IRON: CPT

## 2023-08-30 PROCEDURE — 80061 LIPID PANEL: CPT

## 2023-09-08 LAB
INSULIN FREE SERPL-ACNC: 8.1 UU/ML
INSULIN SERPL-ACNC: 8.1 UU/ML

## 2023-10-19 ENCOUNTER — CLINICAL SUPPORT (OUTPATIENT)
Dept: FAMILY MEDICINE CLINIC | Age: 58
End: 2023-10-19
Payer: COMMERCIAL

## 2023-10-19 DIAGNOSIS — Z23 NEED FOR INFLUENZA VACCINATION: Primary | ICD-10-CM

## 2023-10-19 PROCEDURE — 90686 IIV4 VACC NO PRSV 0.5 ML IM: CPT | Performed by: FAMILY MEDICINE

## 2023-10-19 PROCEDURE — 90471 IMMUNIZATION ADMIN: CPT | Performed by: FAMILY MEDICINE

## 2023-11-09 ENCOUNTER — CLINICAL SUPPORT (OUTPATIENT)
Dept: FAMILY MEDICINE CLINIC | Age: 58
End: 2023-11-09
Payer: COMMERCIAL

## 2023-11-09 ENCOUNTER — HOSPITAL ENCOUNTER (OUTPATIENT)
Dept: MAMMOGRAPHY | Facility: HOSPITAL | Age: 58
Discharge: HOME | End: 2023-11-09
Admitting: INTERNAL MEDICINE
Payer: COMMERCIAL

## 2023-11-09 DIAGNOSIS — Z12.31 VISIT FOR SCREENING MAMMOGRAM: ICD-10-CM

## 2023-11-09 PROCEDURE — 77063 BREAST TOMOSYNTHESIS BI: CPT

## 2023-11-09 PROCEDURE — 77067 SCR MAMMO BI INCL CAD: CPT

## 2024-01-22 ENCOUNTER — OFFICE VISIT (OUTPATIENT)
Dept: FAMILY MEDICINE CLINIC | Age: 59
End: 2024-01-22
Payer: COMMERCIAL

## 2024-01-22 VITALS
HEART RATE: 81 BPM | SYSTOLIC BLOOD PRESSURE: 135 MMHG | TEMPERATURE: 97.3 F | HEIGHT: 63 IN | RESPIRATION RATE: 16 BRPM | DIASTOLIC BLOOD PRESSURE: 79 MMHG | WEIGHT: 186 LBS | BODY MASS INDEX: 32.96 KG/M2 | OXYGEN SATURATION: 98 %

## 2024-01-22 DIAGNOSIS — E88.819 INSULIN RESISTANCE: ICD-10-CM

## 2024-01-22 DIAGNOSIS — I10 PRIMARY HYPERTENSION: ICD-10-CM

## 2024-01-22 DIAGNOSIS — D50.9 IRON DEFICIENCY ANEMIA, UNSPECIFIED IRON DEFICIENCY ANEMIA TYPE: ICD-10-CM

## 2024-01-22 DIAGNOSIS — E55.9 VITAMIN D DEFICIENCY: ICD-10-CM

## 2024-01-22 DIAGNOSIS — E66.01 CLASS 2 SEVERE OBESITY DUE TO EXCESS CALORIES WITH SERIOUS COMORBIDITY AND BODY MASS INDEX (BMI) OF 37.0 TO 37.9 IN ADULT: ICD-10-CM

## 2024-01-22 DIAGNOSIS — R73.03 BORDERLINE DIABETES MELLITUS: Primary | ICD-10-CM

## 2024-01-22 DIAGNOSIS — D51.9 ANEMIA DUE TO VITAMIN B12 DEFICIENCY, UNSPECIFIED B12 DEFICIENCY TYPE: ICD-10-CM

## 2024-01-22 DIAGNOSIS — E78.00 HYPERCHOLESTEREMIA: ICD-10-CM

## 2024-01-22 PROCEDURE — 99214 OFFICE O/P EST MOD 30 MIN: CPT | Performed by: FAMILY MEDICINE

## 2024-01-22 NOTE — PROGRESS NOTES
Denia Wooten presents to [facility].    Chief Complaint: Wt loss    Subjective     History of Present Illness:  HPI          She presents with essential (primary) hypertension, current nonpharmacologic treatment includes low sodium diet.  Her current cardiac medication regimen includes off BB.  Compliance with treatment has been good; she takes her medication as directed and follows up as directed.  She is tolerating the medication well without side effects. No BP diary    She is still going to [employer] and getting a B12/skinny shot once a month, not sure what is in it, last visit I thought she was on Mounjaro, off testosterone injection, she is also on po dhea, vit d3, B12, metformin, MVI, omega-3 and is feeling great and losing wt.  She has lost 46 #s.    She presents with h/o breast cancer-she is on anastrazole.  She is seeing a specialist about correcting her breast asymmetry     She has chronic insomnia and she is off trazodone, on THC gummies.        She also presents with hyperlipidemia, current treatment includes a low cholesterol/low fat diet.  Compliance with treatment has been good; she takes her medication as directed and follows up as directed.  She denies experiencing any hypercholesterolemia related symptoms.          Result Review   The following data was reviewed by Mariel Lima MD on 01/22/2024.  Lab Results   Component Value Date    WBC 3.56 12/01/2022    HGB 13.0 12/01/2022    HCT 41.0 12/01/2022    MCV 95.1 12/01/2022     12/01/2022     Lab Results   Component Value Date    GLUCOSE 90 08/30/2023    BUN 15 08/30/2023    CREATININE 0.60 08/30/2023    EGFR 104.2 08/30/2023    BCR 25.0 08/30/2023    K 4.4 08/30/2023    CO2 25.9 08/30/2023    CALCIUM 9.5 08/30/2023    PROTENTOTREF 7.7 07/03/2018    ALBUMIN 4.6 08/30/2023    BILITOT 0.3 08/30/2023    AST 28 08/30/2023    ALT 29 08/30/2023     Lab Results   Component Value Date    CHOL 198 08/30/2023  "   CHLPL 188 04/08/2021    TRIG 132 08/30/2023    HDL 43 08/30/2023     (H) 08/30/2023     No results found for: \"TSH\"  Lab Results   Component Value Date    HGBA1C 5.80 (H) 08/30/2023     No results found for: \"PSA\"  Lab Results   Component Value Date    IRON 78 08/30/2023      Lab Results   Component Value Date    AXQO01WD 36.8 04/08/2021               Assessment and Plan:   Diagnoses and all orders for this visit:    1. Borderline diabetes mellitus (Primary)  Comments:  encourage ongoing healthy diet, daily exercise and wt loss.  [facility] yearly eye exams    2. Class 2 severe obesity due to excess calories with serious comorbidity and body mass index (BMI) of 37.0 to 37.9 in adult  Comments:  encourage ongoing healthy diet, daily exercise and wt loss  Orders:  -     Comprehensive Metabolic Panel; Future  -     CBC (No Diff); Future  -     TSH+Free T4; Future  -     Hemoglobin A1c; Future    3. Anemia due to vitamin B12 deficiency, unspecified B12 deficiency type  Comments:  cont B12 supplementation  Orders:  -     Vitamin B12; Future    4. Hypercholesteremia  Comments:  low cholesterol diet, will repeat labs and she should cont her diet and wt loss  Orders:  -     Lipid Panel; Future    5. Insulin resistance  Comments:  on metformin, attending a weight loss center, trying to exercise, and doing wonderful    6. Primary hypertension  Comments:  WNL, not currently on meds, doing well with healthy diet, wt loss and exercise  Orders:  -     Comprehensive Metabolic Panel; Future  -     CBC (No Diff); Future    7. Iron deficiency anemia, unspecified iron deficiency anemia type  Comments:  stable on iron supplementation, will check labs and adjust meds pending results  Orders:  -     CBC (No Diff); Future  -     Iron Profile; Future    8. Vitamin D deficiency  Comments:  stable on vit D supplementation, will check labs and adjust meds pending results  Orders:  -     Vitamin D,25-Hydroxy; Future              Objective " "    Medications:  Current Outpatient Medications   Medication Instructions    acyclovir (Zovirax) 5 % ointment 1 application , Topical, Every 3 Hours    anastrozole (ARIMIDEX) 1 mg, Oral, Daily    Cyanocobalamin (B-12) 1000 MCG tablet controlled-release     DHEA 25 MG capsule     metFORMIN (GLUCOPHAGE) 1000 MG tablet     Omega-3 Fatty Acids (Super Omega-3) 1000 MG capsule     vitamin D3 125 MCG (5000 UT) capsule capsule         Vital Signs:   /79   Pulse 81   Temp 97.3 °F (36.3 °C)   Resp 16   Ht 160 cm (62.99\")   Wt 84.4 kg (186 lb)   SpO2 98% Comment: room air  BMI 32.96 kg/m²         Orthostatic /79BP LocationRight armPatient PositionSittingCuff SizeAdultOrthostatic Vyfnb93Lgby50.3 °F (36.3 °C)Temp [id number] %Luzgpq24.4 kg (186 lb)Rupqnn138 cm (62.99\")     Physical Exam:  Physical Exam  Vitals and nursing note reviewed.   Constitutional:       General: She is not in acute distress.     Appearance: Normal appearance. She is not ill-appearing, toxic-appearing or diaphoretic.   HENT:      Head: Normocephalic and atraumatic.      Right Ear: Tympanic membrane, ear canal and external ear normal.      Left Ear: Tympanic membrane, ear canal and external ear normal.      Nose: No congestion or rhinorrhea.      Mouth/Throat:      Mouth: Mucous membranes are moist.      Pharynx: Oropharynx is clear. No oropharyngeal exudate or posterior oropharyngeal erythema.   Eyes:      Extraocular Movements: Extraocular movements intact.      Conjunctiva/sclera: Conjunctivae normal.      Pupils: Pupils are equal, round, and reactive to light.   Cardiovascular:      Rate and Rhythm: Normal rate and regular rhythm.      Heart sounds: Normal heart sounds.   Pulmonary:      Effort: Pulmonary effort is normal.      Breath sounds: Normal breath sounds. No wheezing, rhonchi or rales.   Abdominal:      General: Abdomen is flat.      Palpations: Abdomen is soft. There is no mass.      Tenderness: There is no abdominal " tenderness.      Hernia: No hernia is present.   Musculoskeletal:      Cervical back: Neck supple. No rigidity.      Right lower leg: No edema.      Left lower leg: No edema.   Lymphadenopathy:      Cervical: No cervical adenopathy.   Skin:     General: Skin is warm and dry.   Neurological:      General: No focal deficit present.      Mental Status: She is alert and oriented to person, place, and time. Mental status is at baseline.   Psychiatric:         Mood and Affect: Mood normal.         Behavior: Behavior normal.         Thought Content: Thought content normal.         Judgment: Judgment normal.         Review of Systems:  Review of Systems   Constitutional:  Negative for chills, fatigue and fever.   HENT:  Negative for ear pain, sinus pressure and sore throat.    Eyes:  Negative for blurred vision and double vision.   Respiratory:  Negative for cough, shortness of breath and wheezing.    Cardiovascular:  Negative for chest pain and palpitations.   Gastrointestinal:  Negative for abdominal pain, blood in stool, constipation, diarrhea, nausea and vomiting.   Skin:  Negative for rash.   Neurological:  Negative for dizziness and headache.   Psychiatric/Behavioral:  Negative for sleep disturbance, suicidal ideas and depressed mood. The patient is not nervous/anxious.               Follow Up   Return in about 6 months (around 7/22/2024) for Annual physical, Well Woman Exam.    Part of this note may be an electronic transcription/translation of spoken language to printed   text using the Dragon Dictation System.            Medical History:  Medications Discontinued During This Encounter   Medication Reason    amoxicillin (AMOXIL) 500 MG capsule *Therapy completed    METFORMIN & DIET MANAGE PROD PO *Therapy completed      Past Medical History:    Abnormal chest xray    decreased lung volumes    Allergic rhinitis    Cancer    breast stage 1    Diabetes mellitus type 2 in obese    borderline, stopped Metformin 2 months  ago.      Dyspepsia    Dyspnea on exertion    Fatigue    Hypercholesteremia    Hypertension    Insomnia    Joint pain    Nephrolithiasis    Normocytic anemia    12.4/37.2    Obesity    Obstructive sleep apnea    CPAP says noncompliant - marlin her/claustrophobic    Osteoarthritis of both knees    per prim.  On diclofenac daily, prn Aleve    Peripheral edema    Restless leg syndrome     Past Surgical History:    BILATERAL BREAST REDUCTION    BREAST SURGERY    biopsy    BUNIONECTOMY    Procedure: BUNIONECTOMY RIGHT FOOT WITH OSTEOTOMY;  Surgeon: Fabian Liao DPM;  Location: [facility];  Service: Podiatry;  Laterality: Right;    BUNIONECTOMY    Procedure: BUNIONECTOMY LEFT FOOT WITH OSTEOTOMY;  Surgeon: Fabian Liao DPM;  Location: [facility];  Service: Podiatry;  Laterality: Left;    COLONOSCOPY    unremarkable    GASTRIC BANDING    s/p LAGB APS 2007 GDW, replaced w/ APL 2010 GDW for band slip, followed by AGBR 9/2016 d/t band intolerance.    GASTRIC SLEEVE LAPAROSCOPIC    Procedure: GASTRIC SLEEVE LAPAROSCOPIC;  Surgeon: Johnathan Burkett MD;  Location:  [facility];  Service:     KIDNEY STONE SURGERY    NC LAPS SURG ESOPG/GSTR FUNDOPLASTY    Procedure: HIATAL HERNIA REPAIR LAPAROSCOPIC;  Surgeon: Johnathan Burkett MD;  Location:  [facility];  Service: Bariatric      Family History   Problem Relation Age of Onset    Hypertension Mother     Diabetes Mother     Hypertension Father     Sleep apnea Father     Stroke Father     Alzheimer's disease Father     Cancer Maternal Grandmother     Stomach cancer Maternal Grandmother     Alzheimer's disease Maternal Grandmother     Cancer Maternal Grandfather     Hypertension Maternal Grandfather     Diabetes Maternal Grandfather     Stroke Maternal Grandfather     Colon cancer Maternal Grandfather     Heart attack Paternal Grandfather      Social History     Tobacco Use    Smoking status: Former     Packs/day: 1.50     Years: 25.00     Additional pack years: 0.00      Total pack years: 37.50     Types: Cigarettes     Quit date:      Years since quittin.0    Smokeless tobacco: Never   Substance Use Topics    Alcohol use: Yes     Comment: wine on occasion       Health Maintenance Due   Topic Date Due    TDAP/TD VACCINES (1 - Tdap) Never done    ZOSTER VACCINE (1 of 2) Never done    ANNUAL PHYSICAL  2023        Immunization History   Administered Date(s) Administered    COVID-19 (PFIZER) BIVALENT 12+YRS 2022    COVID-19 (PFIZER) Purple Cap Monovalent 2021, 2021, 2021    COVID-19 F23 (PFIZER) 12YRS+ (COMIRNATY) 2023    Flu Vaccine Intradermal Quad 18-64YR 2013    Fluzone (or Fluarix & Flulaval for VFC) >6mos 2021, 11/10/2022, 10/19/2023    Hepatitis A 2018    Influenza LAIV (Nasal) 2017    Influenza, Unspecified 11/10/2022       Allergies   Allergen Reactions    Ho-Bacit-Poly-Lidocaine Rash    Sulfate Unknown - Low Severity    Sulfa Antibiotics Rash

## 2024-01-26 ENCOUNTER — LAB (OUTPATIENT)
Dept: LAB | Facility: HOSPITAL | Age: 59
End: 2024-01-26
Payer: COMMERCIAL

## 2024-01-26 DIAGNOSIS — D50.9 IRON DEFICIENCY ANEMIA, UNSPECIFIED IRON DEFICIENCY ANEMIA TYPE: ICD-10-CM

## 2024-01-26 DIAGNOSIS — E66.01 CLASS 2 SEVERE OBESITY DUE TO EXCESS CALORIES WITH SERIOUS COMORBIDITY AND BODY MASS INDEX (BMI) OF 37.0 TO 37.9 IN ADULT: ICD-10-CM

## 2024-01-26 DIAGNOSIS — E78.00 HYPERCHOLESTEREMIA: ICD-10-CM

## 2024-01-26 DIAGNOSIS — E55.9 VITAMIN D DEFICIENCY: ICD-10-CM

## 2024-01-26 DIAGNOSIS — I10 PRIMARY HYPERTENSION: ICD-10-CM

## 2024-01-26 DIAGNOSIS — D51.9 ANEMIA DUE TO VITAMIN B12 DEFICIENCY, UNSPECIFIED B12 DEFICIENCY TYPE: ICD-10-CM

## 2024-01-26 LAB
25(OH)D3 SERPL-MCNC: 47.9 NG/ML (ref 30–100)
ALBUMIN SERPL-MCNC: 4.4 G/DL (ref 3.5–5.2)
ALBUMIN/GLOB SERPL: 1.9 G/DL
ALP SERPL-CCNC: 107 U/L (ref 39–117)
ALT SERPL W P-5'-P-CCNC: 32 U/L (ref 1–33)
ANION GAP SERPL CALCULATED.3IONS-SCNC: 9.7 MMOL/L (ref 5–15)
AST SERPL-CCNC: 24 U/L (ref 1–32)
BILIRUB SERPL-MCNC: 0.5 MG/DL (ref 0–1.2)
BUN SERPL-MCNC: 15 MG/DL (ref 6–20)
BUN/CREAT SERPL: 21.4 (ref 7–25)
CALCIUM SPEC-SCNC: 9.3 MG/DL (ref 8.6–10.5)
CHLORIDE SERPL-SCNC: 104 MMOL/L (ref 98–107)
CHOLEST SERPL-MCNC: 202 MG/DL (ref 0–200)
CO2 SERPL-SCNC: 27.3 MMOL/L (ref 22–29)
CREAT SERPL-MCNC: 0.7 MG/DL (ref 0.57–1)
DEPRECATED RDW RBC AUTO: 42.2 FL (ref 37–54)
EGFRCR SERPLBLD CKD-EPI 2021: 99.8 ML/MIN/1.73
ERYTHROCYTE [DISTWIDTH] IN BLOOD BY AUTOMATED COUNT: 11.9 % (ref 12.3–15.4)
GLOBULIN UR ELPH-MCNC: 2.3 GM/DL
GLUCOSE SERPL-MCNC: 87 MG/DL (ref 65–99)
HBA1C MFR BLD: 5.4 % (ref 4.8–5.6)
HCT VFR BLD AUTO: 41.7 % (ref 34–46.6)
HDLC SERPL-MCNC: 53 MG/DL (ref 40–60)
HGB BLD-MCNC: 13.6 G/DL (ref 12–15.9)
IRON 24H UR-MRATE: 112 MCG/DL (ref 37–145)
IRON SATN MFR SERPL: 28 % (ref 20–50)
LDLC SERPL CALC-MCNC: 130 MG/DL (ref 0–100)
LDLC/HDLC SERPL: 2.41 {RATIO}
MCH RBC QN AUTO: 31.1 PG (ref 26.6–33)
MCHC RBC AUTO-ENTMCNC: 32.6 G/DL (ref 31.5–35.7)
MCV RBC AUTO: 95.4 FL (ref 79–97)
PLATELET # BLD AUTO: 293 10*3/MM3 (ref 140–450)
PMV BLD AUTO: 9.7 FL (ref 6–12)
POTASSIUM SERPL-SCNC: 4.1 MMOL/L (ref 3.5–5.2)
PROT SERPL-MCNC: 6.7 G/DL (ref 6–8.5)
RBC # BLD AUTO: 4.37 10*6/MM3 (ref 3.77–5.28)
SODIUM SERPL-SCNC: 141 MMOL/L (ref 136–145)
T4 FREE SERPL-MCNC: 1.08 NG/DL (ref 0.93–1.7)
TIBC SERPL-MCNC: 399 MCG/DL (ref 298–536)
TRANSFERRIN SERPL-MCNC: 268 MG/DL (ref 200–360)
TRIGL SERPL-MCNC: 107 MG/DL (ref 0–150)
TSH SERPL DL<=0.05 MIU/L-ACNC: 1.78 UIU/ML (ref 0.27–4.2)
VIT B12 BLD-MCNC: 1240 PG/ML (ref 211–946)
VLDLC SERPL-MCNC: 19 MG/DL (ref 5–40)
WBC NRBC COR # BLD AUTO: 4.36 10*3/MM3 (ref 3.4–10.8)

## 2024-01-26 PROCEDURE — 82306 VITAMIN D 25 HYDROXY: CPT

## 2024-01-26 PROCEDURE — 80061 LIPID PANEL: CPT

## 2024-01-26 PROCEDURE — 84466 ASSAY OF TRANSFERRIN: CPT

## 2024-01-26 PROCEDURE — 83036 HEMOGLOBIN GLYCOSYLATED A1C: CPT

## 2024-01-26 PROCEDURE — 84439 ASSAY OF FREE THYROXINE: CPT

## 2024-01-26 PROCEDURE — 83540 ASSAY OF IRON: CPT

## 2024-01-26 PROCEDURE — 80050 GENERAL HEALTH PANEL: CPT

## 2024-01-26 PROCEDURE — 82607 VITAMIN B-12: CPT

## 2024-01-26 PROCEDURE — 36415 COLL VENOUS BLD VENIPUNCTURE: CPT

## 2024-07-30 ENCOUNTER — OFFICE VISIT (OUTPATIENT)
Dept: FAMILY MEDICINE CLINIC | Age: 59
End: 2024-07-30
Payer: COMMERCIAL

## 2024-07-30 VITALS
TEMPERATURE: 97.8 F | HEART RATE: 61 BPM | HEIGHT: 63 IN | OXYGEN SATURATION: 98 % | WEIGHT: 164.4 LBS | BODY MASS INDEX: 29.13 KG/M2 | SYSTOLIC BLOOD PRESSURE: 148 MMHG | DIASTOLIC BLOOD PRESSURE: 77 MMHG

## 2024-07-30 DIAGNOSIS — G25.81 RESTLESS LEG SYNDROME: ICD-10-CM

## 2024-07-30 DIAGNOSIS — Z12.31 SCREENING MAMMOGRAM FOR BREAST CANCER: ICD-10-CM

## 2024-07-30 DIAGNOSIS — D50.9 IRON DEFICIENCY ANEMIA, UNSPECIFIED IRON DEFICIENCY ANEMIA TYPE: ICD-10-CM

## 2024-07-30 DIAGNOSIS — Z78.0 MENOPAUSE: ICD-10-CM

## 2024-07-30 DIAGNOSIS — Z00.00 ANNUAL PHYSICAL EXAM: Primary | ICD-10-CM

## 2024-07-30 DIAGNOSIS — I10 PRIMARY HYPERTENSION: ICD-10-CM

## 2024-07-30 DIAGNOSIS — D51.9 ANEMIA DUE TO VITAMIN B12 DEFICIENCY, UNSPECIFIED B12 DEFICIENCY TYPE: ICD-10-CM

## 2024-07-30 DIAGNOSIS — E78.00 HYPERCHOLESTEREMIA: ICD-10-CM

## 2024-07-30 DIAGNOSIS — R73.03 PREDIABETES: ICD-10-CM

## 2024-07-30 DIAGNOSIS — E55.9 VITAMIN D DEFICIENCY: ICD-10-CM

## 2024-07-30 DIAGNOSIS — E66.3 OVERWEIGHT (BMI 25.0-29.9): ICD-10-CM

## 2024-07-30 DIAGNOSIS — Z12.11 COLON CANCER SCREENING: ICD-10-CM

## 2024-07-30 DIAGNOSIS — E88.819 INSULIN RESISTANCE: ICD-10-CM

## 2024-07-30 DIAGNOSIS — Z23 IMMUNIZATION DUE: ICD-10-CM

## 2024-07-30 PROCEDURE — 99214 OFFICE O/P EST MOD 30 MIN: CPT | Performed by: FAMILY MEDICINE

## 2024-07-30 PROCEDURE — 99396 PREV VISIT EST AGE 40-64: CPT | Performed by: FAMILY MEDICINE

## 2024-07-30 PROCEDURE — 90471 IMMUNIZATION ADMIN: CPT | Performed by: FAMILY MEDICINE

## 2024-07-30 PROCEDURE — 90715 TDAP VACCINE 7 YRS/> IM: CPT | Performed by: FAMILY MEDICINE

## 2024-07-30 NOTE — PROGRESS NOTES
Denia presents for her WWE to [facility] Office     Chief Complaint:   Annual physical    Subjective       History of Present Illness:  Obesity  Pertinent negatives include no abdominal pain, chest pain, chills, coughing, fatigue, fever, nausea, rash, sore throat or vomiting.        Patient is in today for yearly physical and WWE.  Last Menses: menopausal  Last Pap: 11/28/2022  Birth control: menopause  Last mammogram: 11/2023-normal  Last DEXA: 11/2022  Last colonoscopy: 10/2016-no polyps, she did have hemorrhoids  Urinary symptoms: none  Nocturia: none  Sexual concerns: none  EtOH use: rare  Tobacco use: NONE, quit 24 years ago  She wears a seatbelt in the car  Drug use: THC gummies for sleep  Last eye exam: April 2024-wears glasses  Dental exams every 6 months  IMMUNIZATIONS:  TDAP, COVID BOOSTER, FLU, SHINGRIX  DIET: improving, more fruits and vegetables, limited junk food  EXERCISE: most days-walking  HEARING:  adequate  Negative depression screen.    She presents with h/o breast cancer-she is on anastrazole.  She is seeing a specialist about correcting her breast asymmetry    She has chronic insomnia and she is on 1/2 THC gummy for sleep         She presents with hyperlipidemia, date of diagnosis 2011.  Current treatment includes a low cholesterol/low fat diet.  Compliance with treatment has been good; she takes her medication as directed and follows up as directed.  She denies experiencing any hypercholesterolemia related symptoms.          She essential (primary) hypertension, current nonpharmacologic treatment includes low sodium diet.  Not currently on current cardiac medication regimen.  Compliance with treatment has been good; she takes her medication as directed and follows up as directed.  She is tolerating the medication well without side effects.  She has not kept a blood pressure diary, but states that pressures have been okay.      She has prediabetes, working on healthy diet and exercise.   She is on metformin and tolerates meds well.  On low carbohydrate diet.   know you have to walk to nois heartbreaking    Review of Systems:  Review of Systems   Constitutional:  Negative for chills, fatigue and fever.   HENT:  Negative for ear pain, sinus pressure and sore throat.    Eyes:  Negative for blurred vision and double vision.   Respiratory:  Negative for cough, shortness of breath and wheezing.    Cardiovascular:  Negative for chest pain, palpitations and leg swelling.   Gastrointestinal:  Negative for abdominal pain, blood in stool, constipation, diarrhea, nausea and vomiting.   Skin:  Negative for rash.   Neurological:  Negative for dizziness and headache.   Psychiatric/Behavioral:  Negative for depressed mood.         Objective   Medical History:  Past Medical History:    Abnormal chest xray    decreased lung volumes    Allergic rhinitis    Cancer    breast stage 1    Diabetes mellitus type 2 in obese    borderline, stopped Metformin 2 months ago.      Dyspepsia    Dyspnea on exertion    Fatigue    Hypercholesteremia    Hypertension    Insomnia    Joint pain    Nephrolithiasis    Normocytic anemia    12.4/37.2    Obesity    Obstructive sleep apnea    CPAP says noncompliant - marlin her/claustrophobic    Osteoarthritis of both knees    per prim.  On diclofenac daily, prn Aleve    Peripheral edema    Restless leg syndrome     Past Surgical History:    BILATERAL BREAST REDUCTION    BREAST SURGERY    biopsy    BUNIONECTOMY    Procedure: BUNIONECTOMY RIGHT FOOT WITH OSTEOTOMY;  Surgeon: Fabian Liao DPM;  Location: [facility] MAIN OR;  Service: Podiatry;  Laterality: Right;    BUNIONECTOMY    Procedure: BUNIONECTOMY LEFT FOOT WITH OSTEOTOMY;  Surgeon: Fabian Liao DPM;  Location: [facility] MAIN OR;  Service: Podiatry;  Laterality: Left;    COLONOSCOPY    unremarkable    GASTRIC BANDING    s/p LAGB APS 2007 GDW, replaced w/ APL 2010 GDW for band slip, followed by AGBR 9/2016 d/t band intolerance.     GASTRIC SLEEVE LAPAROSCOPIC    Procedure: GASTRIC SLEEVE LAPAROSCOPIC;  Surgeon: Johnathan Burkett MD;  Location:  [facility];  Service:     KIDNEY STONE SURGERY    NM LAPS SURG ESOPG/GSTR FUNDOPLASTY    Procedure: HIATAL HERNIA REPAIR LAPAROSCOPIC;  Surgeon: Johnathan Burkett MD;  Location:  [facility];  Service: Bariatric      Family History   Problem Relation Age of Onset    Hypertension Mother     Diabetes Mother     Hypertension Father     Sleep apnea Father     Stroke Father     Alzheimer's disease Father     Cancer Maternal Grandmother     Stomach cancer Maternal Grandmother     Alzheimer's disease Maternal Grandmother     Cancer Maternal Grandfather     Hypertension Maternal Grandfather     Diabetes Maternal Grandfather     Stroke Maternal Grandfather     Colon cancer Maternal Grandfather     Heart attack Paternal Grandfather      Social History     Tobacco Use    Smoking status: Former     Current packs/day: 0.00     Average packs/day: 1.5 packs/day for 25.0 years (37.5 ttl pk-yrs)     Types: Cigarettes     Start date:      Quit date:      Years since quittin.5    Smokeless tobacco: Never   Substance Use Topics    Alcohol use: Yes     Comment: wine on occasion       There are no preventive care reminders to display for this patient.       Immunization History   Administered Date(s) Administered    COVID-19 (PFIZER) BIVALENT 12+YRS 2022    COVID-19 (PFIZER) Purple Cap Monovalent 2021, 2021, 2021    COVID-19 F23 (PFIZER) 12YRS+ (COMIRNATY) 2023    Flu Vaccine Intradermal Quad 18-64YR 2013    Fluzone (or Fluarix & Flulaval for VFC) >6mos 2021, 11/10/2022, 10/19/2023    Hepatitis A 2018    Influenza LAIV (Nasal) 2017    Influenza, Unspecified 11/10/2022    Tdap 2024       Allergies   Allergen Reactions    Ho-Bacit-Poly-Lidocaine Rash    Sulfate Unknown - Low Severity    Sulfa Antibiotics Rash        Medications:  Current Outpatient  "Medications on File Prior to Visit   Medication Sig    acyclovir (Zovirax) 5 % ointment Apply 1 application topically to the appropriate area as directed Every 3 (Three) Hours.    anastrozole (ARIMIDEX) 1 MG tablet Take 1 tablet by mouth Daily.    Cyanocobalamin (B-12) 1000 MCG tablet controlled-release     DHEA 25 MG capsule     Omega-3 Fatty Acids (Super Omega-3) 1000 MG capsule     vitamin D3 125 MCG (5000 UT) capsule capsule     [DISCONTINUED] metFORMIN (GLUCOPHAGE) 1000 MG tablet      No current facility-administered medications on file prior to visit.       Vital Signs:   /77 (BP Location: Left arm, Patient Position: Sitting, Cuff Size: Adult)   Pulse 61   Temp 97.8 °F (36.6 °C) (Oral)   Ht 160 cm (62.99\")   Wt 74.6 kg (164 lb 6.4 oz)   SpO2 98%   BMI 29.13 kg/m²       Physical Exam:  Physical Exam  Vitals and nursing note reviewed.   Constitutional:       General: She is not in acute distress.     Appearance: Normal appearance. She is normal weight. She is not ill-appearing, toxic-appearing or diaphoretic.   HENT:      Head: Normocephalic and atraumatic.      Right Ear: Tympanic membrane, ear canal and external ear normal. There is no impacted cerumen.      Left Ear: Tympanic membrane, ear canal and external ear normal. There is no impacted cerumen.      Nose: No congestion or rhinorrhea.      Mouth/Throat:      Mouth: Mucous membranes are moist.      Pharynx: Oropharynx is clear. No oropharyngeal exudate or posterior oropharyngeal erythema.   Eyes:      Extraocular Movements: Extraocular movements intact.      Conjunctiva/sclera: Conjunctivae normal.      Pupils: Pupils are equal, round, and reactive to light.   Neck:      Vascular: No carotid bruit.   Cardiovascular:      Rate and Rhythm: Normal rate and regular rhythm.      Pulses: Normal pulses.      Heart sounds: Normal heart sounds. No murmur heard.  Pulmonary:      Effort: Pulmonary effort is normal.      Breath sounds: Normal breath " "sounds. No wheezing, rhonchi or rales.   Chest:   Breasts:     Mario Score is 5.      Right: Normal.      Left: Normal.   Abdominal:      General: Abdomen is flat. Bowel sounds are normal.      Palpations: Abdomen is soft.      Tenderness: There is no abdominal tenderness.   Musculoskeletal:         General: No swelling. Normal range of motion.      Cervical back: Neck supple. No rigidity or tenderness.   Lymphadenopathy:      Cervical: No cervical adenopathy.      Upper Body:      Right upper body: No axillary adenopathy.      Left upper body: No axillary adenopathy.   Skin:     General: Skin is warm and dry.   Neurological:      Mental Status: She is alert and oriented to person, place, and time.      Gait: Gait normal.   Psychiatric:         Mood and Affect: Mood normal.         Behavior: Behavior normal.         Judgment: Judgment normal.         Result Review      The following data was reviewed by Mariel Lima MD on 11/28/2022.  Lab Results   Component Value Date    WBC 4.36 01/26/2024    HGB 13.6 01/26/2024    HCT 41.7 01/26/2024    MCV 95.4 01/26/2024     01/26/2024     Lab Results   Component Value Date    GLUCOSE 87 01/26/2024    BUN 15 01/26/2024    CREATININE 0.70 01/26/2024    EGFRIFNONA 103 07/03/2018    EGFRIFAFRI 119 07/03/2018    BCR 21.4 01/26/2024    K 4.1 01/26/2024    CO2 27.3 01/26/2024    CALCIUM 9.3 01/26/2024    PROTENTOTREF 7.7 07/03/2018    ALBUMIN 4.4 01/26/2024    LABIL2 1.8 04/08/2021    AST 24 01/26/2024    ALT 32 01/26/2024     Lab Results   Component Value Date    CHOL 202 (H) 01/26/2024    CHLPL 188 04/08/2021    TRIG 107 01/26/2024    HDL 53 01/26/2024     (H) 01/26/2024     Lab Results   Component Value Date    TSH 1.780 01/26/2024     Lab Results   Component Value Date    HGBA1C 5.40 01/26/2024     No results found for: \"PSA\"                    Assessment and Plan:          Diagnoses and all orders for this visit:    1. Annual physical exam (Primary)    2. " Immunization due  Comments:  Recommend Shingrix and Tdap vaccination  Orders:  -     Tdap Vaccine Greater Than or Equal To 8yo IM    3. Colon cancer screening  Comments:  Colonoscopy up-to-date    4. Screening mammogram for breast cancer  Comments:  Screening mammogram ordered  Orders:  -     Mammo Screening Digital Tomosynthesis Bilateral With CAD; Future    5. Menopause  Comments:  DEXA referral placed  Orders:  -     DEXA Bone Density Axial; Future    6. Restless leg syndrome  Comments:  Much improved since starting iron supplementation over-the-counter    7. Vitamin D deficiency  Comments:  Stable on vitamin D supplementation.  Will check labs and adjust Tx plan pending results  Orders:  -     Vitamin D,25-Hydroxy; Future    8. Iron deficiency anemia, unspecified iron deficiency anemia type  Comments:  Stable on iron supplementation over-the-counter  Orders:  -     Iron Profile; Future    9. Primary hypertension  Comments:  Borderline elevated but home BPs are good.  She is to check home BP and call if BP is greater than 140/90  Orders:  -     Comprehensive Metabolic Panel; Future  -     CBC (No Diff); Future    10. Insulin resistance  Comments:  She is losing weight and working on healthy low-carb diet and daily exercise.  Continue metformin  Orders:  -     Hemoglobin A1c; Future    11. Hypercholesteremia  Comments:  She is diet controlled and defers medication at this time.  Will check labs and adjust Tx plan pending results  Orders:  -     Lipid Panel; Future    12. Anemia due to vitamin B12 deficiency, unspecified B12 deficiency type  Comments:  Will check labs and adjust Tx plan pending results  Orders:  -     Vitamin B12; Future    13. Overweight (BMI 25.0-29.9)  Comments:  Continue healthy diet and daily exercise  Orders:  -     TSH+Free T4; Future    14. Prediabetes  Comments:  She is losing weight and working on healthy low-carb diet and daily exercise. Continue metformin  Orders:  -     metFORMIN  (GLUCOPHAGE) 1000 MG tablet; Take 1 tablet by mouth Daily With Breakfast.  Dispense: 90 tablet; Refill: 1  -     Hemoglobin A1c; Future            Follow Up   Return in about 6 months (around 1/30/2025) for Recheck.

## 2024-08-01 ENCOUNTER — LAB (OUTPATIENT)
Dept: LAB | Facility: HOSPITAL | Age: 59
End: 2024-08-01
Payer: COMMERCIAL

## 2024-08-01 DIAGNOSIS — E78.00 HYPERCHOLESTEREMIA: ICD-10-CM

## 2024-08-01 DIAGNOSIS — R73.03 PREDIABETES: ICD-10-CM

## 2024-08-01 DIAGNOSIS — I10 PRIMARY HYPERTENSION: ICD-10-CM

## 2024-08-01 DIAGNOSIS — D51.9 ANEMIA DUE TO VITAMIN B12 DEFICIENCY, UNSPECIFIED B12 DEFICIENCY TYPE: ICD-10-CM

## 2024-08-01 DIAGNOSIS — E66.3 OVERWEIGHT (BMI 25.0-29.9): ICD-10-CM

## 2024-08-01 DIAGNOSIS — D50.9 IRON DEFICIENCY ANEMIA, UNSPECIFIED IRON DEFICIENCY ANEMIA TYPE: ICD-10-CM

## 2024-08-01 DIAGNOSIS — E88.819 INSULIN RESISTANCE: ICD-10-CM

## 2024-08-01 DIAGNOSIS — E55.9 VITAMIN D DEFICIENCY: ICD-10-CM

## 2024-08-01 LAB
25(OH)D3 SERPL-MCNC: 56.9 NG/ML (ref 30–100)
ALBUMIN SERPL-MCNC: 4.6 G/DL (ref 3.5–5.2)
ALBUMIN/GLOB SERPL: 1.8 G/DL
ALP SERPL-CCNC: 112 U/L (ref 39–117)
ALT SERPL W P-5'-P-CCNC: 17 U/L (ref 1–33)
ANION GAP SERPL CALCULATED.3IONS-SCNC: 11.2 MMOL/L (ref 5–15)
AST SERPL-CCNC: 22 U/L (ref 1–32)
BILIRUB SERPL-MCNC: 0.6 MG/DL (ref 0–1.2)
BUN SERPL-MCNC: 15 MG/DL (ref 6–20)
BUN/CREAT SERPL: 23.4 (ref 7–25)
CALCIUM SPEC-SCNC: 9.4 MG/DL (ref 8.6–10.5)
CHLORIDE SERPL-SCNC: 104 MMOL/L (ref 98–107)
CHOLEST SERPL-MCNC: 211 MG/DL (ref 0–200)
CO2 SERPL-SCNC: 26.8 MMOL/L (ref 22–29)
CREAT SERPL-MCNC: 0.64 MG/DL (ref 0.57–1)
DEPRECATED RDW RBC AUTO: 44 FL (ref 37–54)
EGFRCR SERPLBLD CKD-EPI 2021: 101.9 ML/MIN/1.73
ERYTHROCYTE [DISTWIDTH] IN BLOOD BY AUTOMATED COUNT: 12.4 % (ref 12.3–15.4)
GLOBULIN UR ELPH-MCNC: 2.6 GM/DL
GLUCOSE SERPL-MCNC: 80 MG/DL (ref 65–99)
HBA1C MFR BLD: 5.4 % (ref 4.8–5.6)
HCT VFR BLD AUTO: 44 % (ref 34–46.6)
HDLC SERPL-MCNC: 65 MG/DL (ref 40–60)
HGB BLD-MCNC: 14.1 G/DL (ref 12–15.9)
IRON 24H UR-MRATE: 87 MCG/DL (ref 37–145)
IRON SATN MFR SERPL: 20 % (ref 20–50)
LDLC SERPL CALC-MCNC: 130 MG/DL (ref 0–100)
LDLC/HDLC SERPL: 1.97 {RATIO}
MCH RBC QN AUTO: 30.7 PG (ref 26.6–33)
MCHC RBC AUTO-ENTMCNC: 32 G/DL (ref 31.5–35.7)
MCV RBC AUTO: 95.9 FL (ref 79–97)
PLATELET # BLD AUTO: 282 10*3/MM3 (ref 140–450)
PMV BLD AUTO: 8.9 FL (ref 6–12)
POTASSIUM SERPL-SCNC: 5 MMOL/L (ref 3.5–5.2)
PROT SERPL-MCNC: 7.2 G/DL (ref 6–8.5)
RBC # BLD AUTO: 4.59 10*6/MM3 (ref 3.77–5.28)
SODIUM SERPL-SCNC: 142 MMOL/L (ref 136–145)
T4 FREE SERPL-MCNC: 1.08 NG/DL (ref 0.92–1.68)
TIBC SERPL-MCNC: 437 MCG/DL (ref 298–536)
TRANSFERRIN SERPL-MCNC: 293 MG/DL (ref 200–360)
TRIGL SERPL-MCNC: 91 MG/DL (ref 0–150)
TSH SERPL DL<=0.05 MIU/L-ACNC: 1.36 UIU/ML (ref 0.27–4.2)
VIT B12 BLD-MCNC: >2000 PG/ML (ref 211–946)
VLDLC SERPL-MCNC: 16 MG/DL (ref 5–40)
WBC NRBC COR # BLD AUTO: 5.64 10*3/MM3 (ref 3.4–10.8)

## 2024-08-01 PROCEDURE — 84439 ASSAY OF FREE THYROXINE: CPT

## 2024-08-01 PROCEDURE — 83540 ASSAY OF IRON: CPT

## 2024-08-01 PROCEDURE — 36415 COLL VENOUS BLD VENIPUNCTURE: CPT

## 2024-08-01 PROCEDURE — 80061 LIPID PANEL: CPT

## 2024-08-01 PROCEDURE — 84466 ASSAY OF TRANSFERRIN: CPT

## 2024-08-01 PROCEDURE — 80050 GENERAL HEALTH PANEL: CPT

## 2024-08-01 PROCEDURE — 82306 VITAMIN D 25 HYDROXY: CPT

## 2024-08-01 PROCEDURE — 82607 VITAMIN B-12: CPT

## 2024-08-01 PROCEDURE — 83036 HEMOGLOBIN GLYCOSYLATED A1C: CPT

## 2024-11-14 ENCOUNTER — HOSPITAL ENCOUNTER (OUTPATIENT)
Dept: MAMMOGRAPHY | Facility: HOSPITAL | Age: 59
Discharge: HOME | End: 2024-11-14
Payer: COMMERCIAL

## 2024-11-14 ENCOUNTER — HOSPITAL ENCOUNTER (OUTPATIENT)
Dept: BONE DENSITY | Facility: HOSPITAL | Age: 59
Discharge: HOME | End: 2024-11-14
Payer: COMMERCIAL

## 2024-11-14 DIAGNOSIS — Z12.31 SCREENING MAMMOGRAM FOR BREAST CANCER: ICD-10-CM

## 2024-11-14 DIAGNOSIS — Z78.0 MENOPAUSE: ICD-10-CM

## 2024-11-14 PROCEDURE — 77063 BREAST TOMOSYNTHESIS BI: CPT

## 2024-11-14 PROCEDURE — 77080 DXA BONE DENSITY AXIAL: CPT

## 2024-11-14 PROCEDURE — 77067 SCR MAMMO BI INCL CAD: CPT

## 2025-02-04 ENCOUNTER — OFFICE VISIT (OUTPATIENT)
Dept: FAMILY MEDICINE CLINIC | Age: 60
End: 2025-02-04
Payer: COMMERCIAL

## 2025-02-04 VITALS
HEART RATE: 62 BPM | BODY MASS INDEX: 29.41 KG/M2 | SYSTOLIC BLOOD PRESSURE: 120 MMHG | OXYGEN SATURATION: 99 % | WEIGHT: 166 LBS | TEMPERATURE: 97.8 F | HEIGHT: 63 IN | DIASTOLIC BLOOD PRESSURE: 66 MMHG

## 2025-02-04 DIAGNOSIS — D51.9 ANEMIA DUE TO VITAMIN B12 DEFICIENCY, UNSPECIFIED B12 DEFICIENCY TYPE: ICD-10-CM

## 2025-02-04 DIAGNOSIS — I10 PRIMARY HYPERTENSION: ICD-10-CM

## 2025-02-04 DIAGNOSIS — E55.9 VITAMIN D DEFICIENCY: ICD-10-CM

## 2025-02-04 DIAGNOSIS — D50.9 IRON DEFICIENCY ANEMIA, UNSPECIFIED IRON DEFICIENCY ANEMIA TYPE: ICD-10-CM

## 2025-02-04 DIAGNOSIS — R73.03 PREDIABETES: ICD-10-CM

## 2025-02-04 DIAGNOSIS — Z23 ENCOUNTER FOR IMMUNIZATION: ICD-10-CM

## 2025-02-04 DIAGNOSIS — E88.819 INSULIN RESISTANCE: ICD-10-CM

## 2025-02-04 DIAGNOSIS — G25.81 RESTLESS LEG SYNDROME: ICD-10-CM

## 2025-02-04 DIAGNOSIS — L03.032 CELLULITIS OF GREAT TOE OF LEFT FOOT: Primary | ICD-10-CM

## 2025-02-04 DIAGNOSIS — E78.00 HYPERCHOLESTEREMIA: ICD-10-CM

## 2025-02-04 PROCEDURE — 90471 IMMUNIZATION ADMIN: CPT | Performed by: FAMILY MEDICINE

## 2025-02-04 PROCEDURE — 99214 OFFICE O/P EST MOD 30 MIN: CPT | Performed by: FAMILY MEDICINE

## 2025-02-04 PROCEDURE — 90750 HZV VACC RECOMBINANT IM: CPT | Performed by: FAMILY MEDICINE

## 2025-02-04 RX ORDER — MUPIROCIN 20 MG/G
OINTMENT TOPICAL
COMMUNITY
Start: 2025-01-23

## 2025-02-04 RX ORDER — DIPHENOXYLATE HYDROCHLORIDE AND ATROPINE SULFATE 2.5; .025 MG/1; MG/1
1 TABLET ORAL DAILY
COMMUNITY

## 2025-02-04 RX ORDER — CEPHALEXIN 500 MG/1
500 CAPSULE ORAL 3 TIMES DAILY
Qty: 15 CAPSULE | Refills: 0 | Status: SHIPPED | OUTPATIENT
Start: 2025-02-04

## 2025-02-04 NOTE — PROGRESS NOTES
Denia presents for her routine visit to [facility] Office     Chief Complaint:  BP follow up, med refills for insulin resistance    Subjective       History of Present Illness:     She presents with chronic essential (primary) hypertension, current nonpharmacologic treatment includes low sodium diet.  Not currently on current cardiac medication regimen.  Compliance with treatment has been good; she takes her medication as directed and follows up as directed.  She is tolerating the medication well without side effects.  Recommend she continue to check and monitor home BP and call if> 140/90..          She presents with hyperlipidemia, date of diagnosis 2011.  Current treatment includes a low cholesterol/low fat diet.  Compliance with treatment has been good; she takes her medication as directed and follows up as directed.  She denies experiencing any hypercholesterolemia related symptoms.      She has prediabetes with metabolic syndrome and insulin resistance, she continues to be working on healthy diet and exercise.  She is on metformin and tolerates meds well.  On low carbohydrate diet    She presents with h/o breast cancer-she is off anastrazole.  She is seeing a specialist about correcting her breast asymmetry    She has chronic insomnia and she is on 1/2 THC gummy for sleep and tolerates well and is sleeping well.  She stopped trazodone on her own.    She had an ingrown toenail removed on her left great toe and now it is getting edematous and erythematous with mild drainage.  She is currently using topical mupirocin ointment and wants to know if there is any self in this because she has a sulfa allergy.  Dr. Norman remove the toenail.  She tolerated procedure well    Review of Systems:  Review of Systems   Constitutional:  Negative for chills, fatigue and fever.   HENT:  Negative for ear pain, sinus pressure and sore throat.    Eyes:  Negative for blurred vision and double vision.   Respiratory:   Negative for cough, shortness of breath and wheezing.    Cardiovascular:  Negative for chest pain, palpitations and leg swelling.   Gastrointestinal:  Negative for abdominal pain, blood in stool, constipation, diarrhea, nausea and vomiting.   Skin:  Positive for wound. Negative for rash.   Neurological:  Negative for dizziness and headache.   Psychiatric/Behavioral:  Negative for sleep disturbance, suicidal ideas and depressed mood. The patient is not nervous/anxious.         Objective   Medical History:  Past Medical History:    Abnormal chest xray    decreased lung volumes    Allergic rhinitis    Cancer    breast stage 1    Diabetes mellitus type 2 in obese    borderline, stopped Metformin 2 months ago.      Dyspepsia    Dyspnea on exertion    Fatigue    Hypercholesteremia    Hypertension    Insomnia    Joint pain    Nephrolithiasis    Normocytic anemia    12.4/37.2    Obesity    Obstructive sleep apnea    CPAP says noncompliant - marlin her/claustrophobic    Osteoarthritis of both knees    per prim.  On diclofenac daily, prn Aleve    Peripheral edema    Restless leg syndrome     Past Surgical History:    BILATERAL BREAST REDUCTION    BREAST SURGERY    biopsy    BUNIONECTOMY    Procedure: BUNIONECTOMY RIGHT FOOT WITH OSTEOTOMY;  Surgeon: Fabian Liao DPM;  Location: [facility] MAIN OR;  Service: Podiatry;  Laterality: Right;    BUNIONECTOMY    Procedure: BUNIONECTOMY LEFT FOOT WITH OSTEOTOMY;  Surgeon: Fabian Liao DPM;  Location: [facility] MAIN OR;  Service: Podiatry;  Laterality: Left;    COLONOSCOPY    unremarkable    GASTRIC BANDING    s/p LAGB APS 2007 GDW, replaced w/ APL 2010 GDW for band slip, followed by AGBR 9/2016 d/t band intolerance.    GASTRIC SLEEVE LAPAROSCOPIC    Procedure: GASTRIC SLEEVE LAPAROSCOPIC;  Surgeon: Johnathan Burkett MD;  Location: [facility];  Service:     KIDNEY STONE SURGERY    WA LAPS SURG ESOPG/GSTR FUNDOPLASTY    Procedure: HIATAL HERNIA REPAIR LAPAROSCOPIC;  Surgeon: Johnathan  Raza Burkett MD;  Location: [facility];  Service: Bariatric      Family History   Problem Relation Age of Onset    Hypertension Mother     Diabetes Mother     Hypertension Father     Sleep apnea Father     Stroke Father     Alzheimer's disease Father     Cancer Maternal Grandmother     Stomach cancer Maternal Grandmother     Alzheimer's disease Maternal Grandmother     Cancer Maternal Grandfather     Hypertension Maternal Grandfather     Diabetes Maternal Grandfather     Stroke Maternal Grandfather     Colon cancer Maternal Grandfather     Heart attack Paternal Grandfather      Social History     Tobacco Use    Smoking status: Former     Current packs/day: 0.00     Average packs/day: 1.5 packs/day for 25.0 years (37.5 ttl pk-yrs)     Types: Cigarettes     Start date:      Quit date:      Years since quittin.1     Passive exposure: Past    Smokeless tobacco: Never   Substance Use Topics    Alcohol use: Yes     Comment: wine on occasion       There are no preventive care reminders to display for this patient.       Immunization History   Administered Date(s) Administered    COVID-19 (PFIZER) 12YRS+ (COMIRNATY) 2023    COVID-19 (PFIZER) BIVALENT 12+YRS 2022    COVID-19 (PFIZER) Purple Cap Monovalent 2021, 2021, 2021    Flu Vaccine Intradermal Quad 18-64YR 2013    FluMist 2-49yrs (Nasal) 2017    Fluzone (or Fluarix & Flulaval for VFC) >6mos 2021, 11/10/2022, 10/19/2023    Hepatitis A 2018    Influenza, Unspecified 11/10/2022    Shingrix 2025    Tdap 2024       Allergies   Allergen Reactions    Ho-Bacit-Poly-Lidocaine Rash    Sulfate Unknown - Low Severity    Sulfa Antibiotics Rash        Medications:  Current Outpatient Medications on File Prior to Visit   Medication Sig    acyclovir (Zovirax) 5 % ointment Apply 1 application topically to the appropriate area as directed Every 3 (Three) Hours.    Cyanocobalamin (B-12) 1000 MCG tablet  "controlled-release     DHEA 25 MG capsule     metFORMIN (GLUCOPHAGE) 1000 MG tablet Take 1 tablet by mouth Daily With Breakfast. (Patient taking differently: Take 1 tablet by mouth Daily With Breakfast. 1/2 AM, 1/2 PM.)    MILK THISTLE EXTRACT PO Take 175 mg by mouth Daily.    Multi-Vitamin tablet tablet Take 1 tablet by mouth Daily.    mupirocin (BACTROBAN) 2 % ointment     Omega-3 Fatty Acids (Super Omega-3) 1000 MG capsule     vitamin D3 125 MCG (5000 UT) capsule capsule     [DISCONTINUED] anastrozole (ARIMIDEX) 1 MG tablet Take 1 tablet by mouth Daily. (Patient not taking: Reported on 2/4/2025)     No current facility-administered medications on file prior to visit.       Vital Signs:   /66 (BP Location: Left arm, Patient Position: Sitting, Cuff Size: Adult)   Pulse 62   Temp 97.8 °F (36.6 °C) (Temporal)   Ht 160 cm (62.99\")   Wt 75.3 kg (166 lb)   SpO2 99%   BMI 29.41 kg/m²       Physical Exam:  Physical Exam  Vitals and nursing note reviewed.   Constitutional:       General: She is not in acute distress.     Appearance: Normal appearance. She is normal weight. She is not ill-appearing, toxic-appearing or diaphoretic.   HENT:      Head: Normocephalic and atraumatic.      Right Ear: Tympanic membrane, ear canal and external ear normal. There is no impacted cerumen.      Left Ear: Tympanic membrane, ear canal and external ear normal. There is no impacted cerumen.      Nose: No congestion or rhinorrhea.      Mouth/Throat:      Mouth: Mucous membranes are moist.      Pharynx: Oropharynx is clear. No oropharyngeal exudate or posterior oropharyngeal erythema.   Eyes:      Extraocular Movements: Extraocular movements intact.      Conjunctiva/sclera: Conjunctivae normal.      Pupils: Pupils are equal, round, and reactive to light.   Neck:      Vascular: No carotid bruit.   Cardiovascular:      Rate and Rhythm: Normal rate and regular rhythm.      Pulses: Normal pulses.      Heart sounds: Normal heart " sounds. No murmur heard.  Pulmonary:      Effort: Pulmonary effort is normal.      Breath sounds: Normal breath sounds. No wheezing, rhonchi or rales.   Chest:   Breasts:     Mario Score is 5.      Right: Normal.      Left: Normal.   Abdominal:      General: Abdomen is flat. Bowel sounds are normal.      Palpations: Abdomen is soft.      Tenderness: There is no abdominal tenderness.   Musculoskeletal:         General: No swelling. Normal range of motion.      Cervical back: Neck supple. No rigidity or tenderness.   Lymphadenopathy:      Cervical: No cervical adenopathy.      Upper Body:      Right upper body: No axillary adenopathy.      Left upper body: No axillary adenopathy.   Skin:     General: Skin is warm and dry.          Neurological:      Mental Status: She is alert and oriented to person, place, and time.      Gait: Gait normal.   Psychiatric:         Mood and Affect: Mood normal.         Behavior: Behavior normal.         Thought Content: Thought content normal.         Judgment: Judgment normal.         Result Review      The following data was reviewed by Mariel Lima MD on 11/28/2022.  Lab Results   Component Value Date    WBC 5.64 08/01/2024    HGB 14.1 08/01/2024    HCT 44.0 08/01/2024    MCV 95.9 08/01/2024     08/01/2024     Lab Results   Component Value Date    GLUCOSE 80 08/01/2024    BUN 15 08/01/2024    CREATININE 0.64 08/01/2024    EGFRIFNONA 103 07/03/2018    EGFRIFAFRI 119 07/03/2018    BCR 23.4 08/01/2024    K 5.0 08/01/2024    CO2 26.8 08/01/2024    CALCIUM 9.4 08/01/2024    PROTENTOTREF 7.7 07/03/2018    ALBUMIN 4.6 08/01/2024    LABIL2 1.8 04/08/2021    AST 22 08/01/2024    ALT 17 08/01/2024     Lab Results   Component Value Date    CHOL 211 (H) 08/01/2024    CHLPL 188 04/08/2021    TRIG 91 08/01/2024    HDL 65 (H) 08/01/2024     (H) 08/01/2024     Lab Results   Component Value Date    TSH 1.360 08/01/2024     Lab Results   Component Value Date    HGBA1C 5.40  "08/01/2024     No results found for: \"PSA\"                    Assessment and Plan:          Diagnoses and all orders for this visit:    1. Cellulitis of great toe of left foot (Primary)  Comments:  s/p ingrown toenail removal with Dr Liao.  will have her cont mupirocin and start keflex 500 mg tid x 5 days for acute onset cellulitis  Orders:  -     cephalexin (Keflex) 500 MG capsule; Take 1 capsule by mouth 3 (Three) Times a Day.  Dispense: 15 capsule; Refill: 0    2. Encounter for immunization  Comments:  recc covid, flu and covid vaccinations  Orders:  -     Shingrix Vaccine    3. Restless leg syndrome  Comments:  improved, off trazodone qhs, she is coping ok without medication    4. Vitamin D deficiency  Comments:  cont vitamin D supplementation    5. Iron deficiency anemia, unspecified iron deficiency anemia type  Comments:  cont iron supplementation    6. Primary hypertension  Comments:  not on any medications, stable and well controlled, she is to avoid NA in diet    7. Insulin resistance  Comments:  she is continuing to work hard on healthy diet and daily exercise    8. Hypercholesteremia  Comments:  she is continuing to work hard on healthy diet and daily exercise    9. Anemia due to vitamin B12 deficiency, unspecified B12 deficiency type  Comments:  cont B12 supplementation    10. Prediabetes  Comments:  she is continuing to work hard on healthy diet and daily exercise, hgba1c was 5.4%              Follow Up   Return in about 6 months (around 8/4/2025) for Annual physical.       "

## 2025-03-02 DIAGNOSIS — R73.03 PREDIABETES: ICD-10-CM

## 2025-04-09 ENCOUNTER — CLINICAL SUPPORT (OUTPATIENT)
Dept: FAMILY MEDICINE CLINIC | Age: 60
End: 2025-04-09
Payer: COMMERCIAL

## 2025-04-09 DIAGNOSIS — Z23 IMMUNIZATION DUE: Primary | ICD-10-CM

## 2025-04-09 PROCEDURE — 90750 HZV VACC RECOMBINANT IM: CPT | Performed by: FAMILY MEDICINE

## 2025-04-21 ENCOUNTER — OFFICE VISIT (OUTPATIENT)
Dept: FAMILY MEDICINE CLINIC | Age: 60
End: 2025-04-21
Payer: COMMERCIAL

## 2025-04-21 ENCOUNTER — RESULTS FOLLOW-UP (OUTPATIENT)
Dept: FAMILY MEDICINE CLINIC | Age: 60
End: 2025-04-21

## 2025-04-21 ENCOUNTER — HOSPITAL ENCOUNTER (OUTPATIENT)
Dept: GENERAL RADIOLOGY | Facility: HOSPITAL | Age: 60
Discharge: HOME | End: 2025-04-21
Payer: COMMERCIAL

## 2025-04-21 VITALS
DIASTOLIC BLOOD PRESSURE: 85 MMHG | TEMPERATURE: 98.8 F | OXYGEN SATURATION: 94 % | HEIGHT: 63 IN | BODY MASS INDEX: 30.09 KG/M2 | SYSTOLIC BLOOD PRESSURE: 123 MMHG | WEIGHT: 169.8 LBS | HEART RATE: 79 BPM

## 2025-04-21 DIAGNOSIS — J02.9 SORE THROAT: ICD-10-CM

## 2025-04-21 DIAGNOSIS — R05.1 ACUTE COUGH: ICD-10-CM

## 2025-04-21 DIAGNOSIS — J06.9 ACUTE URI: Primary | ICD-10-CM

## 2025-04-21 LAB
EXPIRATION DATE: NORMAL
EXPIRATION DATE: NORMAL
FLUAV AG UPPER RESP QL IA.RAPID: NOT DETECTED
FLUBV AG UPPER RESP QL IA.RAPID: NOT DETECTED
INTERNAL CONTROL: NORMAL
INTERNAL CONTROL: NORMAL
Lab: NORMAL
Lab: NORMAL
S PYO AG THROAT QL: NEGATIVE
SARS-COV-2 AG UPPER RESP QL IA.RAPID: NOT DETECTED

## 2025-04-21 PROCEDURE — 99214 OFFICE O/P EST MOD 30 MIN: CPT

## 2025-04-21 PROCEDURE — 87081 CULTURE SCREEN ONLY: CPT

## 2025-04-21 PROCEDURE — 87428 SARSCOV & INF VIR A&B AG IA: CPT

## 2025-04-21 PROCEDURE — 71046 X-RAY EXAM CHEST 2 VIEWS: CPT

## 2025-04-21 PROCEDURE — 87880 STREP A ASSAY W/OPTIC: CPT

## 2025-04-21 RX ORDER — DEXTROMETHORPHAN HYDROBROMIDE AND PROMETHAZINE HYDROCHLORIDE 15; 6.25 MG/5ML; MG/5ML
5 SYRUP ORAL NIGHTLY PRN
Qty: 118 ML | Refills: 0 | Status: SHIPPED | OUTPATIENT
Start: 2025-04-21

## 2025-04-21 NOTE — PROGRESS NOTES
Subjective     CHIEF COMPLAINT    Chief Complaint   Patient presents with    Fever     X 4 days.     Cough    Sore Throat    Fatigue       History of Present Illness:  Denia Wooten is a 59 y.o. female who presents to [facility] with acute complaint of cough, fever, sore throat and fatigue.  She is here today with her Sister Lashell.  Also notes a headache.  Cough is productive.  Symptoms have been present for about 4 days.  Just developed a fever this morning.  Has been using Cherry's and Zyrtec.  Denies any chronic lung issues.  She does not smoke.        Review of Systems   Constitutional:  Positive for fatigue and fever. Negative for chills.   HENT:  Positive for sore throat. Negative for congestion and rhinorrhea.    Respiratory:  Positive for cough. Negative for shortness of breath and wheezing.    Cardiovascular:  Negative for chest pain.   Gastrointestinal:  Negative for nausea and vomiting.   Musculoskeletal:  Negative for myalgias.   Neurological:  Negative for headaches.     Past Medical History:   Diagnosis Date    Abnormal chest xray     decreased lung volumes    Allergic rhinitis     Cancer 2019    breast stage 1    Diabetes mellitus type 2 in obese     borderline, stopped Metformin 2 months ago.      Dyspepsia     Dyspnea on exertion     Fatigue     Hypercholesteremia     Hypertension     Insomnia     Joint pain     Nephrolithiasis     Normocytic anemia     12.4/37.2    Obesity     Obstructive sleep apnea     CPAP says noncompliant - marlin her/claustrophobic    Osteoarthritis of both knees     per prim.  On diclofenac daily, prn Aleve    Peripheral edema     Restless leg syndrome          Past Surgical History:   Procedure Laterality Date    BILATERAL BREAST REDUCTION      BREAST SURGERY Right     biopsy    BUNIONECTOMY Right 1/25/2022    Procedure: BUNIONECTOMY RIGHT FOOT WITH OSTEOTOMY;  Surgeon: Fabian Liao DPM;  [place] [facility] [place] [facility];  Service: Podiatry;   Laterality: Right;    BUNIONECTOMY Left 2023    Procedure: BUNIONECTOMY LEFT FOOT WITH OSTEOTOMY;  Surgeon: Fabian Liao DPM;  Location: [facility] [place] [facility];  Service: Podiatry;  Laterality: Left;    COLONOSCOPY      unremarkable    GASTRIC BANDING  2007    s/p LAGB APS 2007 GDW, replaced w/ APL  GDW for band slip, followed by AGBR 2016 d/t band intolerance.    GASTRIC SLEEVE LAPAROSCOPIC N/A 2017    Procedure: GASTRIC SLEEVE LAPAROSCOPIC;  Surgeon: Johnathan Burkett MD;  Location:  [facility];  Service:     KIDNEY STONE SURGERY      MT LAPS SURG ESOPG/GSTR FUNDOPLASTY N/A 2017    Procedure: HIATAL HERNIA REPAIR LAPAROSCOPIC;  Surgeon: Johnathan Burkett MD;  Location:  [facility];  Service: Bariatric         Family History   Problem Relation Age of Onset    Hypertension Mother     Diabetes Mother     Hypertension Father     Sleep apnea Father     Stroke Father     Alzheimer's disease Father     Cancer Maternal Grandmother     Stomach cancer Maternal Grandmother     Alzheimer's disease Maternal Grandmother     Cancer Maternal Grandfather     Hypertension Maternal Grandfather     Diabetes Maternal Grandfather     Stroke Maternal Grandfather     Colon cancer Maternal Grandfather     Heart attack Paternal Grandfather          Social History     Socioeconomic History    Marital status:    Tobacco Use    Smoking status: Former     Current packs/day: 0.00     Average packs/day: 1.5 packs/day for 25.0 years (37.5 ttl pk-yrs)     Types: Cigarettes     Start date:      Quit date:      Years since quittin.3     Passive exposure: Past    Smokeless tobacco: Never   Vaping Use    Vaping status: Never Used   Substance and Sexual Activity    Alcohol use: Yes     Comment: wine on occasion    Drug use: No    Sexual activity: Defer     Comment:          Allergies   Allergen Reactions    Ho-Bacit-Poly-Lidocaine Rash    Sulfate Unknown - Low Severity    Sulfa Antibiotics Rash         "  Current Outpatient Medications on File Prior to Visit   Medication Sig Dispense Refill    Cyanocobalamin (B-12) 1000 MCG tablet controlled-release       DHEA 25 MG capsule       metFORMIN (GLUCOPHAGE) 1000 MG tablet TAKE 1 TABLET BY MOUTH DAILY WITH BREAKFAST 90 tablet 1    MILK THISTLE EXTRACT PO Take 175 mg by mouth Daily.      Multi-Vitamin tablet tablet Take 1 tablet by mouth Daily.      mupirocin (BACTROBAN) 2 % ointment       Omega-3 Fatty Acids (Super Omega-3) 1000 MG capsule       vitamin D3 125 MCG (5000 UT) capsule capsule       acyclovir (Zovirax) 5 % ointment Apply 1 application topically to the appropriate area as directed Every 3 (Three) Hours. (Patient not taking: Reported on 4/21/2025) 5 g 0    [DISCONTINUED] cephalexin (Keflex) 500 MG capsule Take 1 capsule by mouth 3 (Three) Times a Day. (Patient not taking: Reported on 4/21/2025) 15 capsule 0     No current facility-administered medications on file prior to visit.     /85   Pulse 79   Temp 98.8 °F (37.1 °C) (Oral)   Ht 160 cm (62.99\")   Wt 77 kg (169 lb 12.8 oz)   SpO2 94% Comment: room air  BMI 30.09 kg/m²     Objective     Physical Exam  Vitals and nursing note reviewed.   Constitutional:       General: She is not in acute distress.     Appearance: Normal appearance. She is not ill-appearing.   HENT:      Head: Normocephalic.      Right Ear: Tympanic membrane, ear canal and external ear normal.      Left Ear: Tympanic membrane, ear canal and external ear normal.      Nose: Nose normal.      Right Sinus: No maxillary sinus tenderness or frontal sinus tenderness.      Left Sinus: No maxillary sinus tenderness or frontal sinus tenderness.      Mouth/Throat:      Lips: Pink.      Mouth: Mucous membranes are moist.      Pharynx: Oropharynx is clear. Uvula midline. Posterior oropharyngeal erythema present. No pharyngeal swelling, oropharyngeal exudate or uvula swelling.   Eyes:      Pupils: Pupils are equal, round, and reactive to light. "   Cardiovascular:      Rate and Rhythm: Normal rate and regular rhythm.      Heart sounds: Normal heart sounds. No murmur heard.  Pulmonary:      Effort: Pulmonary effort is normal. No accessory muscle usage or respiratory distress.      Breath sounds: Normal breath sounds. No wheezing or rhonchi.   Musculoskeletal:      Cervical back: Normal range of motion.   Lymphadenopathy:      Cervical: No cervical adenopathy.   Skin:     General: Skin is warm and dry.   Neurological:      General: No focal deficit present.      Mental Status: She is alert and oriented to person, place, and time.   Psychiatric:         Mood and Affect: Mood and affect normal.         Behavior: Behavior normal.         Lab Results (last 24 hours)       Procedure Component Value Units Date/Time    POCT SARS-CoV-2 Antigen DEONNA + Flu [[id number]] Collected: 04/21/25 0920    Specimen: Swab Updated: 04/21/25 0922     SARS Antigen Not Detected     Influenza A Antigen DEONNA Not Detected     Influenza B Antigen DEONNA Not Detected     Internal Control Passed     Lot Number [id number]     Expiration Date 12-11-25    POCT rapid strep A [[id number]] Collected: 04/21/25 0920    Specimen: Swab Updated: 04/21/25 0920     Rapid Strep A Screen Negative     Internal Control Passed     Lot Number #[id number]     Expiration Date 2-1-26            Assessment & Plan  Acute URI  COVID, flu and strep testing is negative.  Strep culture sent and pending.  Will treat accordingly.  No evidence of bacterial infection at this time but will check a chest x-ray for further evaluation.  If chest x-ray positive for pneumonia, will treat with antibiotic therapy.  Recommend symptomatic treatment which was discussed with patient.  Increase fluid intake.  Promethazine DM sent to pharmacy for patient to use at night.  Patient made aware this medication may make her drowsy. Recommend Mucinex during the day.  Return and ER precautions advised.       Sore throat    Orders:    POCT SARS-CoV-2  Antigen DEONNA + Flu    POCT rapid strep A    Beta Strep Culture, Throat - , Throat; Future    Acute cough    Orders:    XR Chest PA & Lateral; Future    promethazine-dextromethorphan (PROMETHAZINE-DM) 6.25-15 MG/5ML syrup; Take 5 mL by mouth At Night As Needed for Cough.           Follow up:  Return if symptoms worsen or fail to improve.  Patient was given instructions and counseling regarding her condition or for health maintenance advice. Please see specific information pulled into the AVS if appropriate.

## 2025-04-23 LAB — BACTERIA SPEC AEROBE CULT: NORMAL

## 2025-08-07 ENCOUNTER — OFFICE VISIT (OUTPATIENT)
Dept: FAMILY MEDICINE CLINIC | Age: 60
End: 2025-08-07
Payer: COMMERCIAL

## 2025-08-07 VITALS
BODY MASS INDEX: 29.23 KG/M2 | OXYGEN SATURATION: 98 % | SYSTOLIC BLOOD PRESSURE: 110 MMHG | WEIGHT: 165 LBS | HEIGHT: 63 IN | DIASTOLIC BLOOD PRESSURE: 75 MMHG | HEART RATE: 61 BPM | TEMPERATURE: 98.1 F

## 2025-08-07 DIAGNOSIS — I10 PRIMARY HYPERTENSION: ICD-10-CM

## 2025-08-07 DIAGNOSIS — G25.81 RESTLESS LEG SYNDROME: ICD-10-CM

## 2025-08-07 DIAGNOSIS — E66.3 OVERWEIGHT (BMI 25.0-29.9): ICD-10-CM

## 2025-08-07 DIAGNOSIS — Z23 ENCOUNTER FOR IMMUNIZATION: ICD-10-CM

## 2025-08-07 DIAGNOSIS — R73.03 PREDIABETES: ICD-10-CM

## 2025-08-07 DIAGNOSIS — Z12.11 COLON CANCER SCREENING: ICD-10-CM

## 2025-08-07 DIAGNOSIS — E78.00 HYPERCHOLESTEREMIA: ICD-10-CM

## 2025-08-07 DIAGNOSIS — R74.8 ELEVATED LIVER ENZYMES: ICD-10-CM

## 2025-08-07 DIAGNOSIS — Z78.0 POSTMENOPAUSAL STATE: ICD-10-CM

## 2025-08-07 DIAGNOSIS — Z12.31 SCREENING MAMMOGRAM FOR BREAST CANCER: ICD-10-CM

## 2025-08-07 DIAGNOSIS — Z01.419 WELL WOMAN EXAM: Primary | ICD-10-CM

## 2025-08-07 DIAGNOSIS — D50.9 IRON DEFICIENCY ANEMIA, UNSPECIFIED IRON DEFICIENCY ANEMIA TYPE: ICD-10-CM

## 2025-08-07 DIAGNOSIS — E55.9 VITAMIN D DEFICIENCY: ICD-10-CM

## 2025-08-07 LAB
BILIRUB BLD-MCNC: NEGATIVE MG/DL
CLARITY, POC: CLEAR
COLOR UR: YELLOW
EXPIRATION DATE: ABNORMAL
GLUCOSE UR STRIP-MCNC: NEGATIVE MG/DL
KETONES UR QL: NEGATIVE
LEUKOCYTE EST, POC: NEGATIVE
Lab: ABNORMAL
NITRITE UR-MCNC: NEGATIVE MG/ML
PH UR: 6 [PH] (ref 5–8)
PROT UR STRIP-MCNC: NEGATIVE MG/DL
RBC # UR STRIP: NEGATIVE /UL
SP GR UR: 1.02 (ref 1–1.03)
UROBILINOGEN UR QL: ABNORMAL

## 2025-08-07 PROCEDURE — 82270 OCCULT BLOOD FECES: CPT | Performed by: FAMILY MEDICINE

## 2025-08-07 PROCEDURE — 99214 OFFICE O/P EST MOD 30 MIN: CPT | Performed by: FAMILY MEDICINE

## 2025-08-07 PROCEDURE — G0123 SCREEN CERV/VAG THIN LAYER: HCPCS | Performed by: FAMILY MEDICINE

## 2025-08-07 PROCEDURE — 99396 PREV VISIT EST AGE 40-64: CPT | Performed by: FAMILY MEDICINE

## 2025-08-07 PROCEDURE — 81003 URINALYSIS AUTO W/O SCOPE: CPT | Performed by: FAMILY MEDICINE

## 2025-08-07 RX ORDER — ANASTROZOLE 1 MG/1
TABLET ORAL
COMMUNITY
End: 2025-08-07

## 2025-08-07 RX ORDER — AMOXICILLIN 500 MG/1
CAPSULE ORAL
COMMUNITY
Start: 2025-08-04 | End: 2025-08-07

## 2025-08-11 ENCOUNTER — LAB (OUTPATIENT)
Dept: LAB | Facility: HOSPITAL | Age: 60
End: 2025-08-11
Payer: COMMERCIAL

## 2025-08-11 DIAGNOSIS — E55.9 VITAMIN D DEFICIENCY: ICD-10-CM

## 2025-08-11 DIAGNOSIS — R73.03 PREDIABETES: ICD-10-CM

## 2025-08-11 DIAGNOSIS — E78.00 HYPERCHOLESTEREMIA: ICD-10-CM

## 2025-08-11 DIAGNOSIS — E66.3 OVERWEIGHT (BMI 25.0-29.9): ICD-10-CM

## 2025-08-11 DIAGNOSIS — I10 PRIMARY HYPERTENSION: ICD-10-CM

## 2025-08-11 LAB
25(OH)D3 SERPL-MCNC: 51.6 NG/ML (ref 30–100)
ALBUMIN SERPL-MCNC: 4.1 G/DL (ref 3.5–5.2)
ALBUMIN/GLOB SERPL: 1.8 G/DL
ALP SERPL-CCNC: 106 U/L (ref 39–117)
ALT SERPL W P-5'-P-CCNC: 12 U/L (ref 1–33)
ANION GAP SERPL CALCULATED.3IONS-SCNC: 11.7 MMOL/L (ref 5–15)
AST SERPL-CCNC: 18 U/L (ref 1–32)
BILIRUB SERPL-MCNC: 0.3 MG/DL (ref 0–1.2)
BUN SERPL-MCNC: 15 MG/DL (ref 8–23)
BUN/CREAT SERPL: 25 (ref 7–25)
CALCIUM SPEC-SCNC: 9.1 MG/DL (ref 8.6–10.5)
CHLORIDE SERPL-SCNC: 106 MMOL/L (ref 98–107)
CHOLEST SERPL-MCNC: 196 MG/DL (ref 0–200)
CO2 SERPL-SCNC: 25.3 MMOL/L (ref 22–29)
CREAT SERPL-MCNC: 0.6 MG/DL (ref 0.57–1)
CYTOLOGIST CVX/VAG CYTO: NORMAL
CYTOLOGY CVX/VAG DOC CYTO: NORMAL
DEPRECATED RDW RBC AUTO: 42.9 FL (ref 37–54)
DEVELOPER EXPIRATION DATE: NORMAL
DEVELOPER LOT NUMBER: NORMAL
DX ICD CODE: NORMAL
EGFRCR SERPLBLD CKD-EPI 2021: 102.9 ML/MIN/1.73
ERYTHROCYTE [DISTWIDTH] IN BLOOD BY AUTOMATED COUNT: 12.3 % (ref 12.3–15.4)
EXPIRATION DATE: NORMAL
FECAL OCCULT BLOOD SCREEN, POC: NEGATIVE
GLOBULIN UR ELPH-MCNC: 2.3 GM/DL
GLUCOSE SERPL-MCNC: 84 MG/DL (ref 65–99)
HBA1C MFR BLD: 5.2 % (ref 4.8–5.6)
HCT VFR BLD AUTO: 39.2 % (ref 34–46.6)
HDLC SERPL-MCNC: 60 MG/DL (ref 40–60)
HGB BLD-MCNC: 13.2 G/DL (ref 12–15.9)
LDLC SERPL CALC-MCNC: 124 MG/DL (ref 0–100)
LDLC/HDLC SERPL: 2.04 {RATIO}
Lab: 232
MCH RBC QN AUTO: 31.3 PG (ref 26.6–33)
MCHC RBC AUTO-ENTMCNC: 33.7 G/DL (ref 31.5–35.7)
MCV RBC AUTO: 92.9 FL (ref 79–97)
NEGATIVE CONTROL: NEGATIVE
OTHER STN SPEC: NORMAL
PLATELET # BLD AUTO: 280 10*3/MM3 (ref 140–450)
PMV BLD AUTO: 9.6 FL (ref 6–12)
POSITIVE CONTROL: POSITIVE
POTASSIUM SERPL-SCNC: 4.7 MMOL/L (ref 3.5–5.2)
PROT SERPL-MCNC: 6.4 G/DL (ref 6–8.5)
RBC # BLD AUTO: 4.22 10*6/MM3 (ref 3.77–5.28)
SERVICE CMNT-IMP: NORMAL
SODIUM SERPL-SCNC: 143 MMOL/L (ref 136–145)
STAT OF ADQ CVX/VAG CYTO-IMP: NORMAL
T4 FREE SERPL-MCNC: 0.97 NG/DL (ref 0.92–1.68)
TRIGL SERPL-MCNC: 68 MG/DL (ref 0–150)
TSH SERPL DL<=0.05 MIU/L-ACNC: 2.36 UIU/ML (ref 0.27–4.2)
VLDLC SERPL-MCNC: 12 MG/DL (ref 5–40)
WBC NRBC COR # BLD AUTO: 3.93 10*3/MM3 (ref 3.4–10.8)

## 2025-08-11 PROCEDURE — 82306 VITAMIN D 25 HYDROXY: CPT

## 2025-08-11 PROCEDURE — 80050 GENERAL HEALTH PANEL: CPT

## 2025-08-11 PROCEDURE — 84439 ASSAY OF FREE THYROXINE: CPT

## 2025-08-11 PROCEDURE — 83036 HEMOGLOBIN GLYCOSYLATED A1C: CPT

## 2025-08-11 PROCEDURE — 80061 LIPID PANEL: CPT

## 2025-08-11 PROCEDURE — 36415 COLL VENOUS BLD VENIPUNCTURE: CPT

## (undated) DEVICE — INTENDED FOR TISSUE SEPARATION, AND OTHER PROCEDURES THAT REQUIRE A SHARP SURGICAL BLADE TO PUNCTURE OR CUT.: Brand: BARD-PARKER ® CARBON RIB-BACK BLADES

## (undated) DEVICE — ENDOPATH XCEL BLADELESS TROCARS WITH STABILITY SLEEVES: Brand: ENDOPATH XCEL

## (undated) DEVICE — SUT VIC 3/0 SH 27IN J416H

## (undated) DEVICE — ENDOPATH XCEL UNIVERSAL TROCAR STABLILITY SLEEVES: Brand: ENDOPATH XCEL

## (undated) DEVICE — IRRIGATOR BULB ASEPTO 60CC STRL

## (undated) DEVICE — GLV SURG SIGNATURE TOUCH PF LTX 8 STRL BX/50

## (undated) DEVICE — PK MINOR PROCEDURE 46

## (undated) DEVICE — DRSNG WND GZ CURAD OIL EMULSION 3X3IN STRL

## (undated) DEVICE — FLTR PLUMEPORT LAP W/CONN STRL

## (undated) DEVICE — SMALL TEAR CROSS CUT RASP (11.0 X 5.0MM)

## (undated) DEVICE — ECHELON FLEX POWERED PLUS LONG ARTICULATING ENDOSCOPIC LINEAR CUTTER, 60MM: Brand: ECHELON FLEX

## (undated) DEVICE — PK BARIATRIC 10

## (undated) DEVICE — SPNG GZ WOVN 4X4IN 12PLY 10/BX STRL

## (undated) DEVICE — SUT PROLN 5/0 PC3 BL 18IN 8635G

## (undated) DEVICE — DISPOSABLE TOURNIQUET CUFF SINGLE BLADDER, SINGLE PORT AND QUICK CONNECT CONNECTOR: Brand: COLOR CUFF

## (undated) DEVICE — APPL ST DUPLOSPRAY MIS 40CM

## (undated) DEVICE — AIRWY 90MM NO9

## (undated) DEVICE — DEFOGGER!" ANTI FOG KIT: Brand: DEROYAL

## (undated) DEVICE — DRAPE,EXTREMITY,89X128,STERILE: Brand: MEDLINE

## (undated) DEVICE — SYR LL TP 10ML STRL

## (undated) DEVICE — APPL CHLORAPREP HI/LITE 26ML ORNG

## (undated) DEVICE — DUAL LUMEN STOMACH TUBE,ANTI-REFLUX VALVE: Brand: SALEM SUMP

## (undated) DEVICE — DRAPE,REIN 53X77,STERILE: Brand: MEDLINE

## (undated) DEVICE — KWIRE .045 4IN
Type: IMPLANTABLE DEVICE | Site: FOOT | Status: NON-FUNCTIONAL
Removed: 2023-02-28

## (undated) DEVICE — TBG PENCL TELESCP MEGADYNE SMOKE EVAC 10FT

## (undated) DEVICE — BANDAGE ROLL,100% COTTON, 6 PLY, LARGE: Brand: KERLIX

## (undated) DEVICE — 50" SINGLE PATIENT USE HOVERMATT: Brand: SINGLE PATIENT USE HOVERMATT

## (undated) DEVICE — Device

## (undated) DEVICE — MEDI-VAC NON-CONDUCTIVE SUCTION TUBING: Brand: CARDINAL HEALTH

## (undated) DEVICE — 9165 UNIVERSAL PATIENT PLATE: Brand: 3M™

## (undated) DEVICE — PRECISION THIN (9.0 X 0.38 X 25.0MM)

## (undated) DEVICE — SUT PDS 3/0 SH 27IN DYED Z316H

## (undated) DEVICE — NDL HYPO PRECISIONGLIDE REG 19G 1 1/2

## (undated) DEVICE — MEDI-VAC YANKAUER SUCTION HANDLE W/BULBOUS TIP: Brand: CARDINAL HEALTH

## (undated) DEVICE — TP PAPR MICROPORE 2IN

## (undated) DEVICE — TISSUE RETRIEVAL SYSTEM: Brand: INZII RETRIEVAL SYSTEM

## (undated) DEVICE — BNDG ELAS SLF ADHR 4IN 5YD LTX

## (undated) DEVICE — SUT VIC PLSTC UD PS2 4/0 27IN J426

## (undated) DEVICE — NDL HYPO PRECISIONGLIDE REG 25G 1 1/2

## (undated) DEVICE — BNDG ESMARK 4IN 12FT LF STRL BLU

## (undated) DEVICE — BNDG GZ SOF-FORM CONFRM 3X75IN LF STRL

## (undated) DEVICE — HARMONIC HD 1000I SHEARS, 36CM SHAFT LENGTH: Brand: HARMONIC

## (undated) DEVICE — 1010 S-DRAPE TOWEL DRAPE 10/BX: Brand: STERI-DRAPE™

## (undated) DEVICE — GRD PIN WHT 0.45

## (undated) DEVICE — SYS CLS PORTSITE CT CLOSESURE 5AND10/12

## (undated) DEVICE — ENCORE® LATEX MICRO SIZE 7.5, STERILE LATEX POWDER-FREE SURGICAL GLOVE: Brand: ENCORE

## (undated) DEVICE — SUT MONOCRYL PLS ANTIB UND 3/0  PS1 27IN

## (undated) DEVICE — GLV SURG BIOGEL LTX PF 7 1/2

## (undated) DEVICE — CANNULA,ADULT,SOFT-TOUCH,7TUBE,SC: Brand: MEDLINE

## (undated) DEVICE — SKIN AFFIX SURG ADHESIVE 72/CS 0.55ML: Brand: MEDLINE